# Patient Record
Sex: FEMALE | Race: WHITE
[De-identification: names, ages, dates, MRNs, and addresses within clinical notes are randomized per-mention and may not be internally consistent; named-entity substitution may affect disease eponyms.]

---

## 2019-08-09 ENCOUNTER — HOSPITAL ENCOUNTER (OUTPATIENT)
Dept: HOSPITAL 62 - RAD | Age: 64
End: 2019-08-09
Attending: ORTHOPAEDIC SURGERY
Payer: COMMERCIAL
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
## 2019-08-09 DIAGNOSIS — S89.91XS: Primary | ICD-10-CM

## 2019-08-09 PROCEDURE — 77002 NEEDLE LOCALIZATION BY XRAY: CPT

## 2019-08-09 PROCEDURE — 27369 NJX CNTRST KNE ARTHG/CT/MRI: CPT

## 2019-08-09 NOTE — RADIOLOGY REPORT (SQ)
EXAM DESCRIPTION:  ARTHRO KNEE NJX CNTRST CT/MRI; FLUORO/NEEDLE PLACEMENT



COMPLETED DATE/TIME:  8/9/2019 10:20 am; 8/9/2019 10:21 am



REASON FOR STUDY:  S89.91XS UNSPECIFIED INJURY OF RIGHT LOWER LEG, SEQUELA S89.91XS  UNSPECIFIED INJU
RY OF RIGHT LOWER LEG, SEQUELA



COMPARISON:  None.



FLUOROSCOPY TIME:  0.2 minutes

2 images saved to PACS.



LIMITATIONS:  None.



PROCEDURE:  Procedure, risks, benefits and alternatives explained to patient who then gave written co
nsent.  The right knee was marked and a time-out was called for correct marking verification.  Entry 
site marked using fluoroscopic guidance.  Knee prepped and draped using sterile technique.  Local ane
sthesia achieved using 1% lidocaine injection.  Hypodermic needle introduced into the joint space und
er direct fluoroscopic visualization.  Non-ionic contrast instilled to confirm intra-articular positi
on.  Additional dilute non-ionic contrast instilled.  Needle removed and entry site covered with ster
ile bandage.  No immediate complications noted.



TECHNIQUE:  Digital images acquired during fluoroscopy and stored on PACS.   Patient immediately take
n to the CT suite for additional imaging.

INJECTION LOCATION: Right knee.

CONTRAST TYPE AND AMOUNT: 8 cc 1% lidocaine, 1 cc Omnipaque, 35 cc Omnipaque saline mixture



IMPRESSION:  SUCCESSFUL NEEDLE PLACEMENT AND INJECTION FOR RIGHT KNEE CT ARTHROGRAM.



COMMENT:  Quality :  Final reports for procedures using fluoroscopy that document radiation exp
osure indices, or exposure time and number of fluorographic images (if radiation exposure indices are
 not available)



TECHNICAL DOCUMENTATION:  JOB ID:  [id number]

 2011 [employer]- All Rights Reserved



Reading location - IP/workstation name: [email or web address]

## 2019-08-09 NOTE — RADIOLOGY REPORT (SQ)
EXAM DESCRIPTION:  ARTHRO KNEE NJX CNTRST CT/MRI; FLUORO/NEEDLE PLACEMENT



COMPLETED DATE/TIME:  8/9/2019 10:20 am; 8/9/2019 10:21 am



REASON FOR STUDY:  S89.91XS UNSPECIFIED INJURY OF RIGHT LOWER LEG, SEQUELA S89.91XS  UNSPECIFIED INJU
RY OF RIGHT LOWER LEG, SEQUELA



COMPARISON:  None.



FLUOROSCOPY TIME:  0.2 minutes

2 images saved to PACS.



LIMITATIONS:  None.



PROCEDURE:  Procedure, risks, benefits and alternatives explained to patient who then gave written co
nsent.  The right knee was marked and a time-out was called for correct marking verification.  Entry 
site marked using fluoroscopic guidance.  Knee prepped and draped using sterile technique.  Local ane
sthesia achieved using 1% lidocaine injection.  Hypodermic needle introduced into the joint space und
er direct fluoroscopic visualization.  Non-ionic contrast instilled to confirm intra-articular positi
on.  Additional dilute non-ionic contrast instilled.  Needle removed and entry site covered with ster
ile bandage.  No immediate complications noted.



TECHNIQUE:  Digital images acquired during fluoroscopy and stored on PACS.   Patient immediately take
n to the CT suite for additional imaging.

INJECTION LOCATION: Right knee.

CONTRAST TYPE AND AMOUNT: 8 cc 1% lidocaine, 1 cc Omnipaque, 35 cc Omnipaque saline mixture



IMPRESSION:  SUCCESSFUL NEEDLE PLACEMENT AND INJECTION FOR RIGHT KNEE CT ARTHROGRAM.



COMMENT:  Quality :  Final reports for procedures using fluoroscopy that document radiation exp
osure indices, or exposure time and number of fluorographic images (if radiation exposure indices are
 not available)



TECHNICAL DOCUMENTATION:  JOB ID:  [id number]

 2011 [employer]- All Rights Reserved



Reading location - IP/workstation name: [email or web address]

## 2019-08-10 NOTE — RADIOLOGY REPORT (SQ)
EXAM DESCRIPTION:  CT RT LOWER EXTREMITY WITH



COMPLETED DATE/TIME:  8/9/2019 10:27 am



REASON FOR STUDY:  S89.91XS UNSPECIFIED INJURY OF RIGHT LOWER LEG, SEQUELA S89.91XS  UNSPECIFIED INJU
RY OF RIGHT LOWER LEG, SEQUELA



COMPARISON:   None.



TECHNIQUE:  Post arthrographic imaging performed through the right knee with reformatted coronal and 
sagittal imaging windowed for bone and soft tissues.

All CT scanners at this facility use dose modulation, iterative reconstruction, and/or weight based d
osing when appropriate to reduce radiation dose to as low as reasonably achievable (ALARA).

[facility]: Dose Right  [facility]: CareDose    [facility]: Dose Right    [facility]: Teradose 4D    [facility]: Smart DFine



RADIATION DOSE:  CT Rad equipment meets quality standard of care and radiation dose reduction techniq
ues were employed. CTDIvol: 5.2 mGy. DLP: 139 mGy-cm.  mGy.



LIMITATIONS:  None.



FINDINGS:  SOFT TISSUES: No regional soft tissue masses or radiopaque foreign body noted.

BONES: No suspicious bone lesion.  No fracture.

JOINT DISTENTION: Adequate distention with contrast.  No intra-articular bodies.

CHONDRAL SURFACES: Patellofemoral chondral thinning.  No discrete full-thickness defects or large sub
chondral cysts or erosions suggested.  Similar findings in the medial and lateral compartments.

MENISCI: Medial and lateral menisci are without gross tear or extrusion.

OTHER: Normal patellar height.



IMPRESSION:

1. Chondral thinning throughout all compartments.

2. No meniscus tear.



TECHNICAL DOCUMENTATION:  JOB ID:  [id number]

Quality ID # [id number]: Final reports with documentation of one or more dose reduction techniques (e.g., Au
tomated exposure control, adjustment of the mA and/or kV according to patient size, use of iterative 
reconstruction technique)

 2011 [employer]- All Rights Reserved



Reading location - IP/workstation name: [email or web address]

## 2024-05-21 PROBLEM — E78.5 DYSLIPIDEMIA: Status: ACTIVE | Noted: 2024-05-13

## 2024-05-21 PROBLEM — E03.9 HYPOTHYROIDISM: Status: ACTIVE | Noted: 2024-05-13

## 2024-05-21 PROBLEM — G10 HUNTINGTON'S DISEASE (HCC): Chronic | Status: ACTIVE | Noted: 2023-09-13

## 2024-06-18 PROBLEM — I10 ESSENTIAL HYPERTENSION: Status: ACTIVE | Noted: 2024-06-11

## 2024-06-18 PROBLEM — E87.1 HYPONATREMIA: Chronic | Status: ACTIVE | Noted: 2024-06-11

## 2024-06-18 PROBLEM — E03.9 ACQUIRED HYPOTHYROIDISM: Chronic | Status: ACTIVE | Noted: 2024-05-13

## 2024-06-18 PROBLEM — E78.2 MIXED DYSLIPIDEMIA: Chronic | Status: ACTIVE | Noted: 2024-05-13

## 2024-06-18 PROBLEM — E78.2 MIXED DYSLIPIDEMIA: Status: ACTIVE | Noted: 2024-05-13

## 2024-06-18 PROBLEM — G47.01 INSOMNIA DUE TO MEDICAL CONDITION: Status: ACTIVE | Noted: 2024-06-11

## 2024-06-18 PROBLEM — G31.9 DIFFUSE BRAIN ATROPHY (HCC): Chronic | Status: ACTIVE | Noted: 2024-06-11

## 2024-06-18 PROBLEM — E87.1 HYPONATREMIA: Status: ACTIVE | Noted: 2024-06-11

## 2024-06-18 PROBLEM — G31.9 DIFFUSE BRAIN ATROPHY (HCC): Status: ACTIVE | Noted: 2024-06-11

## 2024-07-02 PROBLEM — I10 ESSENTIAL HYPERTENSION: Chronic | Status: ACTIVE | Noted: 2024-06-11

## 2024-07-02 PROBLEM — G47.01 INSOMNIA DUE TO MEDICAL CONDITION: Chronic | Status: ACTIVE | Noted: 2024-06-11

## 2024-07-07 PROBLEM — K21.9 GERD WITHOUT ESOPHAGITIS: Chronic | Status: ACTIVE | Noted: 2024-07-02

## 2024-07-07 PROBLEM — F51.05 INSOMNIA SECONDARY TO DEPRESSION WITH ANXIETY: Chronic | Status: ACTIVE | Noted: 2024-07-02

## 2024-07-07 PROBLEM — F41.8 INSOMNIA SECONDARY TO DEPRESSION WITH ANXIETY: Chronic | Status: ACTIVE | Noted: 2024-07-02

## 2024-07-07 PROBLEM — Z71.89 ADVANCED CARE PLANNING/COUNSELING DISCUSSION: Status: ACTIVE | Noted: 2024-07-02

## 2024-07-07 PROBLEM — Z71.89 ADVANCED CARE PLANNING/COUNSELING DISCUSSION: Chronic | Status: ACTIVE | Noted: 2024-07-02

## 2024-07-07 PROBLEM — F51.05 INSOMNIA SECONDARY TO DEPRESSION WITH ANXIETY: Status: ACTIVE | Noted: 2024-07-02

## 2024-07-07 PROBLEM — K21.9 GERD WITHOUT ESOPHAGITIS: Status: ACTIVE | Noted: 2024-07-02

## 2024-07-07 PROBLEM — F41.8 INSOMNIA SECONDARY TO DEPRESSION WITH ANXIETY: Status: ACTIVE | Noted: 2024-07-02

## 2024-07-12 ENCOUNTER — HOSPITAL ENCOUNTER (OUTPATIENT)
Facility: MEDICAL CENTER | Age: 69
End: 2024-07-12
Attending: NURSE PRACTITIONER
Payer: MEDICARE

## 2024-07-12 DIAGNOSIS — Z79.899 HIGH RISK MEDICATION USE: ICD-10-CM

## 2024-07-12 DIAGNOSIS — E87.1 HYPONATREMIA: Chronic | ICD-10-CM

## 2024-07-12 DIAGNOSIS — I10 ESSENTIAL HYPERTENSION: ICD-10-CM

## 2024-07-12 DIAGNOSIS — E03.9 ACQUIRED HYPOTHYROIDISM: Chronic | ICD-10-CM

## 2024-07-12 DIAGNOSIS — E78.2 MIXED DYSLIPIDEMIA: Chronic | ICD-10-CM

## 2024-07-12 LAB
25(OH)D3 SERPL-MCNC: 55 NG/ML (ref 30–100)
ALBUMIN SERPL BCP-MCNC: 3.8 G/DL (ref 3.2–4.9)
ALBUMIN/GLOB SERPL: 1.3 G/DL
ALP SERPL-CCNC: 395 U/L (ref 30–99)
ALT SERPL-CCNC: 23 U/L (ref 2–50)
ANION GAP SERPL CALC-SCNC: 16 MMOL/L (ref 7–16)
APPEARANCE UR: CLEAR
AST SERPL-CCNC: 26 U/L (ref 12–45)
BACTERIA #/AREA URNS HPF: NEGATIVE /HPF
BASOPHILS # BLD AUTO: 0.7 % (ref 0–1.8)
BASOPHILS # BLD: 0.04 K/UL (ref 0–0.12)
BILIRUB SERPL-MCNC: 0.5 MG/DL (ref 0.1–1.5)
BILIRUB UR QL STRIP.AUTO: NEGATIVE
BUN SERPL-MCNC: 7 MG/DL (ref 8–22)
CALCIUM ALBUM COR SERPL-MCNC: 9.7 MG/DL (ref 8.5–10.5)
CALCIUM SERPL-MCNC: 9.5 MG/DL (ref 8.5–10.5)
CHLORIDE SERPL-SCNC: 101 MMOL/L (ref 96–112)
CHOLEST SERPL-MCNC: 174 MG/DL (ref 100–199)
CO2 SERPL-SCNC: 26 MMOL/L (ref 20–33)
COLOR UR: YELLOW
CREAT SERPL-MCNC: 0.86 MG/DL (ref 0.5–1.4)
EOSINOPHIL # BLD AUTO: 0.16 K/UL (ref 0–0.51)
EOSINOPHIL NFR BLD: 2.7 % (ref 0–6.9)
EPI CELLS #/AREA URNS HPF: NEGATIVE /HPF
ERYTHROCYTE [DISTWIDTH] IN BLOOD BY AUTOMATED COUNT: 50.9 FL (ref 35.9–50)
EST. AVERAGE GLUCOSE BLD GHB EST-MCNC: 97 MG/DL
GFR SERPLBLD CREATININE-BSD FMLA CKD-EPI: 73 ML/MIN/1.73 M 2
GLOBULIN SER CALC-MCNC: 3 G/DL (ref 1.9–3.5)
GLUCOSE SERPL-MCNC: 102 MG/DL (ref 65–99)
GLUCOSE UR STRIP.AUTO-MCNC: NEGATIVE MG/DL
HBA1C MFR BLD: 5 % (ref 4–5.6)
HCT VFR BLD AUTO: 47.3 % (ref 37–47)
HDLC SERPL-MCNC: 60 MG/DL
HGB BLD-MCNC: 15 G/DL (ref 12–16)
HYALINE CASTS #/AREA URNS LPF: NORMAL /LPF
IMM GRANULOCYTES # BLD AUTO: 0.01 K/UL (ref 0–0.11)
IMM GRANULOCYTES NFR BLD AUTO: 0.2 % (ref 0–0.9)
KETONES UR STRIP.AUTO-MCNC: NEGATIVE MG/DL
LDLC SERPL CALC-MCNC: 96 MG/DL
LEUKOCYTE ESTERASE UR QL STRIP.AUTO: ABNORMAL
LYMPHOCYTES # BLD AUTO: 1.57 K/UL (ref 1–4.8)
LYMPHOCYTES NFR BLD: 26.4 % (ref 22–41)
MAGNESIUM SERPL-MCNC: 2.3 MG/DL (ref 1.5–2.5)
MCH RBC QN AUTO: 30.7 PG (ref 27–33)
MCHC RBC AUTO-ENTMCNC: 31.7 G/DL (ref 32.2–35.5)
MCV RBC AUTO: 96.9 FL (ref 81.4–97.8)
MICRO URNS: ABNORMAL
MONOCYTES # BLD AUTO: 0.48 K/UL (ref 0–0.85)
MONOCYTES NFR BLD AUTO: 8.1 % (ref 0–13.4)
NEUTROPHILS # BLD AUTO: 3.68 K/UL (ref 1.82–7.42)
NEUTROPHILS NFR BLD: 61.9 % (ref 44–72)
NITRITE UR QL STRIP.AUTO: NEGATIVE
NRBC # BLD AUTO: 0 K/UL
NRBC BLD-RTO: 0 /100 WBC (ref 0–0.2)
PH UR STRIP.AUTO: 7.5 [PH] (ref 5–8)
PHOSPHATE SERPL-MCNC: 3.7 MG/DL (ref 2.5–4.5)
PLATELET # BLD AUTO: 346 K/UL (ref 164–446)
PMV BLD AUTO: 11 FL (ref 9–12.9)
POTASSIUM SERPL-SCNC: 3.2 MMOL/L (ref 3.6–5.5)
PROT SERPL-MCNC: 6.8 G/DL (ref 6–8.2)
PROT UR QL STRIP: NEGATIVE MG/DL
RBC # BLD AUTO: 4.88 M/UL (ref 4.2–5.4)
RBC # URNS HPF: NORMAL /HPF
RBC UR QL AUTO: NEGATIVE
SODIUM SERPL-SCNC: 143 MMOL/L (ref 135–145)
SP GR UR STRIP.AUTO: 1.01
TRIGL SERPL-MCNC: 88 MG/DL (ref 0–149)
UROBILINOGEN UR STRIP.AUTO-MCNC: 0.2 MG/DL
WBC # BLD AUTO: 5.9 K/UL (ref 4.8–10.8)
WBC #/AREA URNS HPF: NORMAL /HPF

## 2024-07-12 PROCEDURE — 83735 ASSAY OF MAGNESIUM: CPT

## 2024-07-12 PROCEDURE — 83036 HEMOGLOBIN GLYCOSYLATED A1C: CPT | Mod: GA

## 2024-07-12 PROCEDURE — 87086 URINE CULTURE/COLONY COUNT: CPT

## 2024-07-12 PROCEDURE — 80053 COMPREHEN METABOLIC PANEL: CPT

## 2024-07-12 PROCEDURE — 81001 URINALYSIS AUTO W/SCOPE: CPT

## 2024-07-12 PROCEDURE — 85025 COMPLETE CBC W/AUTO DIFF WBC: CPT

## 2024-07-12 PROCEDURE — 84100 ASSAY OF PHOSPHORUS: CPT

## 2024-07-12 PROCEDURE — 82306 VITAMIN D 25 HYDROXY: CPT

## 2024-07-12 PROCEDURE — 80061 LIPID PANEL: CPT

## 2024-07-15 LAB
BACTERIA UR CULT: NORMAL
SIGNIFICANT IND 70042: NORMAL
SITE SITE: NORMAL
SOURCE SOURCE: NORMAL

## 2024-08-20 PROBLEM — E87.6 HYPOKALEMIA: Status: ACTIVE | Noted: 2024-08-13

## 2024-08-20 PROBLEM — E87.6 HYPOKALEMIA: Chronic | Status: ACTIVE | Noted: 2024-08-13

## 2024-09-24 ENCOUNTER — APPOINTMENT (OUTPATIENT)
Dept: NEUROLOGY | Facility: MEDICAL CENTER | Age: 69
End: 2024-09-24
Attending: PSYCHIATRY & NEUROLOGY
Payer: MEDICARE

## 2024-10-05 PROBLEM — F33.40 DEPRESSION, MAJOR, RECURRENT, IN REMISSION (HCC): Chronic | Status: ACTIVE | Noted: 2024-09-24

## 2024-10-05 PROBLEM — R15.9 FUNCTIONAL FECAL INCONTINENCE: Chronic | Status: ACTIVE | Noted: 2024-09-24

## 2024-10-05 PROBLEM — R15.9 FUNCTIONAL FECAL INCONTINENCE: Status: ACTIVE | Noted: 2024-09-24

## 2024-10-05 PROBLEM — F33.40 DEPRESSION, MAJOR, RECURRENT, IN REMISSION (HCC): Status: ACTIVE | Noted: 2024-09-24

## 2024-10-17 ENCOUNTER — HOSPITAL ENCOUNTER (OUTPATIENT)
Facility: MEDICAL CENTER | Age: 69
End: 2024-10-17
Attending: NURSE PRACTITIONER
Payer: MEDICARE

## 2024-10-17 DIAGNOSIS — R15.9 FUNCTIONAL FECAL INCONTINENCE: Chronic | ICD-10-CM

## 2024-10-17 DIAGNOSIS — I10 ESSENTIAL HYPERTENSION: Chronic | ICD-10-CM

## 2024-10-17 DIAGNOSIS — E87.1 HYPONATREMIA: Chronic | ICD-10-CM

## 2024-10-17 DIAGNOSIS — R30.0 DYSURIA: ICD-10-CM

## 2024-10-17 LAB
ALBUMIN SERPL BCP-MCNC: 3.9 G/DL (ref 3.2–4.9)
ALBUMIN/GLOB SERPL: 1.3 G/DL
ALP SERPL-CCNC: 152 U/L (ref 30–99)
ALT SERPL-CCNC: 11 U/L (ref 2–50)
ANION GAP SERPL CALC-SCNC: 14 MMOL/L (ref 7–16)
AST SERPL-CCNC: 16 U/L (ref 12–45)
BASOPHILS # BLD AUTO: 0.5 % (ref 0–1.8)
BASOPHILS # BLD: 0.03 K/UL (ref 0–0.12)
BILIRUB SERPL-MCNC: 0.4 MG/DL (ref 0.1–1.5)
BUN SERPL-MCNC: 6 MG/DL (ref 8–22)
CALCIUM ALBUM COR SERPL-MCNC: 10.2 MG/DL (ref 8.5–10.5)
CALCIUM SERPL-MCNC: 10.1 MG/DL (ref 8.5–10.5)
CHLORIDE SERPL-SCNC: 101 MMOL/L (ref 96–112)
CO2 SERPL-SCNC: 28 MMOL/L (ref 20–33)
CREAT SERPL-MCNC: 1.09 MG/DL (ref 0.5–1.4)
EOSINOPHIL # BLD AUTO: 0.07 K/UL (ref 0–0.51)
EOSINOPHIL NFR BLD: 1.1 % (ref 0–6.9)
ERYTHROCYTE [DISTWIDTH] IN BLOOD BY AUTOMATED COUNT: 46.1 FL (ref 35.9–50)
GFR SERPLBLD CREATININE-BSD FMLA CKD-EPI: 55 ML/MIN/1.73 M 2
GLOBULIN SER CALC-MCNC: 2.9 G/DL (ref 1.9–3.5)
GLUCOSE SERPL-MCNC: 80 MG/DL (ref 65–99)
HCT VFR BLD AUTO: 44.3 % (ref 37–47)
HGB BLD-MCNC: 14.7 G/DL (ref 12–16)
IMM GRANULOCYTES # BLD AUTO: 0.01 K/UL (ref 0–0.11)
IMM GRANULOCYTES NFR BLD AUTO: 0.2 % (ref 0–0.9)
LYMPHOCYTES # BLD AUTO: 1.19 K/UL (ref 1–4.8)
LYMPHOCYTES NFR BLD: 18.8 % (ref 22–41)
MCH RBC QN AUTO: 29.9 PG (ref 27–33)
MCHC RBC AUTO-ENTMCNC: 33.2 G/DL (ref 32.2–35.5)
MCV RBC AUTO: 90 FL (ref 81.4–97.8)
MONOCYTES # BLD AUTO: 0.48 K/UL (ref 0–0.85)
MONOCYTES NFR BLD AUTO: 7.6 % (ref 0–13.4)
NEUTROPHILS # BLD AUTO: 4.55 K/UL (ref 1.82–7.42)
NEUTROPHILS NFR BLD: 71.8 % (ref 44–72)
NRBC # BLD AUTO: 0 K/UL
NRBC BLD-RTO: 0 /100 WBC (ref 0–0.2)
PLATELET # BLD AUTO: 288 K/UL (ref 164–446)
PMV BLD AUTO: 11 FL (ref 9–12.9)
POTASSIUM SERPL-SCNC: 3.1 MMOL/L (ref 3.6–5.5)
PROT SERPL-MCNC: 6.8 G/DL (ref 6–8.2)
RBC # BLD AUTO: 4.92 M/UL (ref 4.2–5.4)
SODIUM SERPL-SCNC: 143 MMOL/L (ref 135–145)
WBC # BLD AUTO: 6.3 K/UL (ref 4.8–10.8)

## 2024-10-17 PROCEDURE — 85025 COMPLETE CBC W/AUTO DIFF WBC: CPT

## 2024-10-17 PROCEDURE — 80053 COMPREHEN METABOLIC PANEL: CPT

## 2024-10-22 PROBLEM — N18.30 BENIGN HYPERTENSION WITH CKD (CHRONIC KIDNEY DISEASE) STAGE III: Chronic | Status: ACTIVE | Noted: 2024-06-11

## 2024-10-22 PROBLEM — I12.9 BENIGN HYPERTENSION WITH CKD (CHRONIC KIDNEY DISEASE) STAGE III: Chronic | Status: ACTIVE | Noted: 2024-06-11

## 2024-10-22 PROBLEM — Z91.81 AT MAXIMUM RISK FOR FALL: Chronic | Status: ACTIVE | Noted: 2024-10-22

## 2024-10-22 PROBLEM — N18.30 BENIGN HYPERTENSION WITH CKD (CHRONIC KIDNEY DISEASE) STAGE III: Status: ACTIVE | Noted: 2024-06-11

## 2024-10-22 PROBLEM — I12.9 BENIGN HYPERTENSION WITH CKD (CHRONIC KIDNEY DISEASE) STAGE III: Status: ACTIVE | Noted: 2024-06-11

## 2024-10-22 PROBLEM — Z91.81 AT MAXIMUM RISK FOR FALL: Status: ACTIVE | Noted: 2024-10-22

## 2024-11-18 ENCOUNTER — APPOINTMENT (OUTPATIENT)
Dept: RADIOLOGY | Facility: MEDICAL CENTER | Age: 69
End: 2024-11-18
Attending: EMERGENCY MEDICINE
Payer: MEDICARE

## 2024-11-18 ENCOUNTER — HOSPITAL ENCOUNTER (EMERGENCY)
Facility: MEDICAL CENTER | Age: 69
End: 2024-11-18
Attending: EMERGENCY MEDICINE
Payer: MEDICARE

## 2024-11-18 VITALS
RESPIRATION RATE: 17 BRPM | WEIGHT: 160 LBS | BODY MASS INDEX: 25.71 KG/M2 | HEIGHT: 66 IN | HEART RATE: 95 BPM | SYSTOLIC BLOOD PRESSURE: 131 MMHG | DIASTOLIC BLOOD PRESSURE: 80 MMHG | TEMPERATURE: 97.5 F | OXYGEN SATURATION: 95 %

## 2024-11-18 DIAGNOSIS — M25.551 RIGHT HIP PAIN: ICD-10-CM

## 2024-11-18 PROCEDURE — 99283 EMERGENCY DEPT VISIT LOW MDM: CPT

## 2024-11-18 PROCEDURE — A9270 NON-COVERED ITEM OR SERVICE: HCPCS | Performed by: EMERGENCY MEDICINE

## 2024-11-18 PROCEDURE — 700102 HCHG RX REV CODE 250 W/ 637 OVERRIDE(OP): Performed by: EMERGENCY MEDICINE

## 2024-11-18 PROCEDURE — 73501 X-RAY EXAM HIP UNI 1 VIEW: CPT | Mod: RT

## 2024-11-18 RX ORDER — ACETAMINOPHEN 500 MG
1000 TABLET ORAL ONCE
Status: COMPLETED | OUTPATIENT
Start: 2024-11-18 | End: 2024-11-18

## 2024-11-18 RX ORDER — IBUPROFEN 600 MG/1
600 TABLET, FILM COATED ORAL ONCE
Status: COMPLETED | OUTPATIENT
Start: 2024-11-18 | End: 2024-11-18

## 2024-11-18 RX ADMIN — IBUPROFEN 600 MG: 600 TABLET, FILM COATED ORAL at 15:51

## 2024-11-18 RX ADMIN — ACETAMINOPHEN 1000 MG: 500 TABLET ORAL at 15:50

## 2024-11-18 ASSESSMENT — FIBROSIS 4 INDEX: FIB4 SCORE: 1.16

## 2024-11-18 NOTE — ED PROVIDER NOTES
ED Provider Note    Primary care provider: QUANG Barber  Means of arrival: private vehicle  History obtained from: patient  History limited by: none    CHIEF COMPLAINT  Chief Complaint   Patient presents with    Hip Pain     Was attempting to get into a wheelchair and fell onto R hip and has been having 5/10 pain. Able to move extremity. Denies taking blood thinner, -headstrike, -LOC.        HPI/ROS  Cinda Perrin is a 69 y.o. female who presents to the Emergency Department for evaluation of right hip pain.  Patient reports that she was visiting her  upstairs in the hospital and when she went to sit down in a chair she missed the chair and landed right on her right hip.  She notes soreness to her right hip.  She was able to ambulate after the incident but wanted to have it checked.  Denies any other injuries.  She did not hit her head or lose consciousness.  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    EXTERNAL RECORDS REVIEWED  Patient last seen by primary care provider on 10/22/2024.  Per that note patient currently resides at [facility] for her Coryell's disease.  She also has a history of hypertension, chronic kidney disease, hypothyroidism, dyslipidemia.    LIMITATION TO HISTORY   None    PAST MEDICAL HISTORY   Sade's disease, hypertension, chronic kidney disease, hypothyroidism    SURGICAL HISTORY  patient denies any surgical history    SOCIAL HISTORY  Social History     Tobacco Use    Smoking status: Never    Smokeless tobacco: Never   Substance Use Topics    Alcohol use: Not Currently     Comment: Used to drink glass of deanne coke    Drug use: Never      Social History     Substance and Sexual Activity   Drug Use Never       FAMILY HISTORY  History reviewed. No pertinent family history.    CURRENT MEDICATIONS  Home Medications       Reviewed by Elizabeth Lopez R.N. (Registered Nurse) on 11/18/24 at 1522  Med  "List Status: Partial     Medication Last Dose Status   acetaminophen (TYLENOL) 650 MG CR tablet  Active   aspirin 81 MG EC tablet  Active   calcium carbonate (OS-VANCE 500) 1250 (500 Ca) MG Tab  Active   cholecalciferol (VITAMIN D3) 5000 UNIT Cap  Active   colestipol (COLESTID) 1 GM Tab  Active   Deutetrabenazine (AUSTEDO) 12 MG Tab  Active   levothyroxine (SYNTHROID) 50 MCG Tab  Active   lisinopril (PRINIVIL) 10 MG Tab  Active   mirtazapine (REMERON) 30 MG Tab tablet  Active   pantoprazole (PROTONIX) 20 MG tablet  Active   potassium chloride ER (KLOR-CON) 10 MEQ tablet  Active   STARCH-MALTO DEXTRIN (THICK-IT) Powder  Active   traZODone (DESYREL) 50 MG Tab  Active                    ALLERGIES  Allergies   Allergen Reactions    Codeine Unspecified     Intake    Oxycodone Rash       PHYSICAL EXAM  VITAL SIGNS: /76   Pulse 98   Temp 36.5 °C (97.7 °F) (Temporal)   Resp 18   Ht 1.676 m (5' 6\")   Wt 72.6 kg (160 lb)   SpO2 94%   BMI 25.82 kg/m²   Vitals reviewed by myself.  Physical Exam  Nursing note and vitals reviewed.  Constitutional: Well-developed and well-nourished.  Patient appears uncomfortable  HENT: Head is normocephalic and atraumatic.  Eyes: extra-ocular movements intact  Cardiovascular: Regular rate and  regular rhythm.  2+ bilateral distal pedal pulses  Pulmonary/Chest: Normal respiratory effort  Musculoskeletal: Patient has full range of motion of her right hip without difficulty.  No tenderness to palpation.  No obvious deformity.  Neurological: Awake and alert, sensation intact in bilateral lower extremities  Skin: Skin is warm and dry. No rash.         DIAGNOSTIC STUDIES:  LABS  none    RADIOLOGY  Images independently interpreted by myself prior to radiologist review:  -X-ray demonstrates no acute bony pathology    Final interpretation by radiology demonstrates:    DX-HIP-UNILATERAL-WITH PELVIS-1 VIEW RIGHT   Final Result      No RIGHT hip or pelvic fracture.        The radiologist's " interpretation of all radiological studies have been reviewed by me.      COURSE & MEDICAL DECISION MAKING      INITIAL ASSESSMENT, ED COURSE AND PLAN    Patient is a 69-year-old female who presents for evaluation of right hip pain.  Differential diagnosis includes contusion, sprain, strain, fracture, dislocation.  Diagnostic workup includes right hip x-ray.    Patient's initial vitals are within normal limits.  She is neurovascularly intact on exam.  Patient is treated with Tylenol and Motrin for her discomfort.  X-ray returns and demonstrates no acute bony pathology.  Patient is able to ambulate and therefore further imaging not indicated.  Therefore patient is reassured and advised on management of right hip pain.  She is given strict return precautions and discharged in stable condition.    DISPOSITION AND DISCUSSIONS  I have discussed management of the patient with the following physicians and GAIL's:  none    Discussion of management with other QHP or appropriate source(s): none     Escalation of care considered, and ultimately not performed:see above    Barriers to care at this time, including but not limited to: none.     Decision tools and prescription drugs considered including, but not limited to: se above.        FINAL IMPRESSION  1. Right hip pain

## 2024-11-18 NOTE — ED TRIAGE NOTES
"Chief Complaint   Patient presents with    Hip Pain     Was attempting to get into a wheelchair and fell onto R hip and has been having 5/10 pain. Able to move extremity. Denies taking blood thinner, -headstrike, -LOC.      /76   Pulse 98   Temp 36.5 °C (97.7 °F) (Temporal)   Resp 18   Ht 1.676 m (5' 6\")   Wt 72.6 kg (160 lb)   SpO2 94%   BMI 25.82 kg/m²     Pt ambulatory to triage w/ above complaint.   Denies any medical history.  Placed pt back in lobby, educated on NPO status.     "

## 2024-11-19 NOTE — ED NOTES
Pt provided discharge instructions. Pt verbalized understanding of all instructions. Pt to lobby via wheelchair.

## 2024-12-01 PROBLEM — M25.551 RIGHT HIP PAIN: Status: ACTIVE | Noted: 2024-11-26

## 2024-12-01 PROBLEM — M25.551 RIGHT HIP PAIN: Chronic | Status: ACTIVE | Noted: 2024-11-26
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
## 2024-12-29 PROCEDURE — 99285 EMERGENCY DEPT VISIT HI MDM: CPT

## 2024-12-29 ASSESSMENT — FIBROSIS 4 INDEX: FIB4 SCORE: 1.16

## 2024-12-30 ENCOUNTER — HOSPITAL ENCOUNTER (INPATIENT)
Facility: MEDICAL CENTER | Age: 69
End: 2024-12-30
Attending: EMERGENCY MEDICINE | Admitting: STUDENT IN AN ORGANIZED HEALTH CARE EDUCATION/TRAINING PROGRAM
Payer: MEDICARE

## 2024-12-30 DIAGNOSIS — E87.1 HYPONATREMIA: ICD-10-CM

## 2024-12-30 DIAGNOSIS — R55 SYNCOPE, UNSPECIFIED SYNCOPE TYPE: Primary | ICD-10-CM

## 2024-12-30 DIAGNOSIS — R53.1 WEAKNESS: ICD-10-CM

## 2024-12-30 DIAGNOSIS — G10 HUNTINGTON'S DISEASE (HCC): ICD-10-CM

## 2024-12-30 DIAGNOSIS — R11.2 NAUSEA AND VOMITING, UNSPECIFIED VOMITING TYPE: ICD-10-CM

## 2024-12-30 LAB
ALBUMIN SERPL BCP-MCNC: 4 G/DL (ref 3.2–4.9)
ALBUMIN/GLOB SERPL: 1.4 G/DL
ALP SERPL-CCNC: 151 U/L (ref 30–99)
ALT SERPL-CCNC: 18 U/L (ref 2–50)
ANION GAP SERPL CALC-SCNC: 15 MMOL/L (ref 7–16)
APPEARANCE UR: CLEAR
AST SERPL-CCNC: 51 U/L (ref 12–45)
BASOPHILS # BLD AUTO: 0.1 % (ref 0–1.8)
BASOPHILS # BLD: 0.02 K/UL (ref 0–0.12)
BILIRUB SERPL-MCNC: 0.4 MG/DL (ref 0.1–1.5)
BILIRUB UR QL STRIP.AUTO: NEGATIVE
BUN SERPL-MCNC: 4 MG/DL (ref 8–22)
CALCIUM ALBUM COR SERPL-MCNC: 8.9 MG/DL (ref 8.5–10.5)
CALCIUM SERPL-MCNC: 8.9 MG/DL (ref 8.5–10.5)
CHLORIDE SERPL-SCNC: 88 MMOL/L (ref 96–112)
CO2 SERPL-SCNC: 18 MMOL/L (ref 20–33)
COLOR UR: YELLOW
CREAT SERPL-MCNC: 0.89 MG/DL (ref 0.5–1.4)
CREAT UR-MCNC: 53.3 MG/DL
EKG IMPRESSION: NORMAL
EOSINOPHIL # BLD AUTO: 0 K/UL (ref 0–0.51)
EOSINOPHIL NFR BLD: 0 % (ref 0–6.9)
ERYTHROCYTE [DISTWIDTH] IN BLOOD BY AUTOMATED COUNT: 41.3 FL (ref 35.9–50)
ETHANOL BLD-MCNC: 45.2 MG/DL
GFR SERPLBLD CREATININE-BSD FMLA CKD-EPI: 70 ML/MIN/1.73 M 2
GLOBULIN SER CALC-MCNC: 2.9 G/DL (ref 1.9–3.5)
GLUCOSE SERPL-MCNC: 136 MG/DL (ref 65–99)
GLUCOSE UR STRIP.AUTO-MCNC: NEGATIVE MG/DL
HCT VFR BLD AUTO: 36.2 % (ref 37–47)
HGB BLD-MCNC: 13 G/DL (ref 12–16)
IMM GRANULOCYTES # BLD AUTO: 0.07 K/UL (ref 0–0.11)
IMM GRANULOCYTES NFR BLD AUTO: 0.4 % (ref 0–0.9)
KETONES UR STRIP.AUTO-MCNC: NEGATIVE MG/DL
LEUKOCYTE ESTERASE UR QL STRIP.AUTO: NEGATIVE
LIPASE SERPL-CCNC: 20 U/L (ref 11–82)
LYMPHOCYTES # BLD AUTO: 0.53 K/UL (ref 1–4.8)
LYMPHOCYTES NFR BLD: 3.2 % (ref 22–41)
MCH RBC QN AUTO: 31.6 PG (ref 27–33)
MCHC RBC AUTO-ENTMCNC: 35.9 G/DL (ref 32.2–35.5)
MCV RBC AUTO: 88.1 FL (ref 81.4–97.8)
MICRO URNS: NORMAL
MONOCYTES # BLD AUTO: 1.01 K/UL (ref 0–0.85)
MONOCYTES NFR BLD AUTO: 6.1 % (ref 0–13.4)
NEUTROPHILS # BLD AUTO: 14.96 K/UL (ref 1.82–7.42)
NEUTROPHILS NFR BLD: 90.2 % (ref 44–72)
NITRITE UR QL STRIP.AUTO: NEGATIVE
NRBC # BLD AUTO: 0 K/UL
NRBC BLD-RTO: 0 /100 WBC (ref 0–0.2)
OSMOLALITY SERPL: 269 MOSM/KG H2O (ref 278–298)
OSMOLALITY UR: 223 MOSM/KG H2O (ref 300–900)
PH UR STRIP.AUTO: 5.5 [PH] (ref 5–8)
PLATELET # BLD AUTO: 331 K/UL (ref 164–446)
PMV BLD AUTO: 9.3 FL (ref 9–12.9)
POC BREATHALIZER: 0.02 PERCENT (ref 0–0.01)
POTASSIUM SERPL-SCNC: 3.4 MMOL/L (ref 3.6–5.5)
PROT SERPL-MCNC: 6.9 G/DL (ref 6–8.2)
PROT UR QL STRIP: NEGATIVE MG/DL
RBC # BLD AUTO: 4.11 M/UL (ref 4.2–5.4)
RBC UR QL AUTO: NEGATIVE
SODIUM SERPL-SCNC: 121 MMOL/L (ref 135–145)
SODIUM SERPL-SCNC: 122 MMOL/L (ref 135–145)
SODIUM SERPL-SCNC: 123 MMOL/L (ref 135–145)
SODIUM SERPL-SCNC: 127 MMOL/L (ref 135–145)
SODIUM SERPL-SCNC: 130 MMOL/L (ref 135–145)
SODIUM SERPL-SCNC: 131 MMOL/L (ref 135–145)
SODIUM UR-SCNC: 25 MMOL/L
SP GR UR STRIP.AUTO: 1.01
TROPONIN T SERPL-MCNC: 17 NG/L (ref 6–19)
TSH SERPL DL<=0.005 MIU/L-ACNC: 0.82 UIU/ML (ref 0.38–5.33)
UROBILINOGEN UR STRIP.AUTO-MCNC: 0.2 EU/DL
WBC # BLD AUTO: 16.6 K/UL (ref 4.8–10.8)

## 2024-12-30 PROCEDURE — 700111 HCHG RX REV CODE 636 W/ 250 OVERRIDE (IP): Mod: JZ | Performed by: STUDENT IN AN ORGANIZED HEALTH CARE EDUCATION/TRAINING PROGRAM

## 2024-12-30 PROCEDURE — 82077 ASSAY SPEC XCP UR&BREATH IA: CPT

## 2024-12-30 PROCEDURE — 81003 URINALYSIS AUTO W/O SCOPE: CPT

## 2024-12-30 PROCEDURE — 36415 COLL VENOUS BLD VENIPUNCTURE: CPT

## 2024-12-30 PROCEDURE — 83935 ASSAY OF URINE OSMOLALITY: CPT

## 2024-12-30 PROCEDURE — 700102 HCHG RX REV CODE 250 W/ 637 OVERRIDE(OP): Performed by: STUDENT IN AN ORGANIZED HEALTH CARE EDUCATION/TRAINING PROGRAM

## 2024-12-30 PROCEDURE — 96375 TX/PRO/DX INJ NEW DRUG ADDON: CPT

## 2024-12-30 PROCEDURE — 85025 COMPLETE CBC W/AUTO DIFF WBC: CPT

## 2024-12-30 PROCEDURE — 99223 1ST HOSP IP/OBS HIGH 75: CPT | Performed by: STUDENT IN AN ORGANIZED HEALTH CARE EDUCATION/TRAINING PROGRAM

## 2024-12-30 PROCEDURE — 84443 ASSAY THYROID STIM HORMONE: CPT

## 2024-12-30 PROCEDURE — 302970 POC BREATHALIZER: Performed by: EMERGENCY MEDICINE

## 2024-12-30 PROCEDURE — 83690 ASSAY OF LIPASE: CPT

## 2024-12-30 PROCEDURE — 700111 HCHG RX REV CODE 636 W/ 250 OVERRIDE (IP): Performed by: EMERGENCY MEDICINE

## 2024-12-30 PROCEDURE — 84300 ASSAY OF URINE SODIUM: CPT

## 2024-12-30 PROCEDURE — 96365 THER/PROPH/DIAG IV INF INIT: CPT

## 2024-12-30 PROCEDURE — 84295 ASSAY OF SERUM SODIUM: CPT | Mod: 91

## 2024-12-30 PROCEDURE — 99497 ADVNCD CARE PLAN 30 MIN: CPT | Performed by: STUDENT IN AN ORGANIZED HEALTH CARE EDUCATION/TRAINING PROGRAM

## 2024-12-30 PROCEDURE — 84484 ASSAY OF TROPONIN QUANT: CPT

## 2024-12-30 PROCEDURE — 700102 HCHG RX REV CODE 250 W/ 637 OVERRIDE(OP): Performed by: HOSPITALIST

## 2024-12-30 PROCEDURE — 82570 ASSAY OF URINE CREATININE: CPT

## 2024-12-30 PROCEDURE — 83930 ASSAY OF BLOOD OSMOLALITY: CPT

## 2024-12-30 PROCEDURE — 700111 HCHG RX REV CODE 636 W/ 250 OVERRIDE (IP): Mod: JZ | Performed by: HOSPITALIST

## 2024-12-30 PROCEDURE — 93005 ELECTROCARDIOGRAM TRACING: CPT | Mod: TC | Performed by: EMERGENCY MEDICINE

## 2024-12-30 PROCEDURE — 770020 HCHG ROOM/CARE - TELE (206)

## 2024-12-30 PROCEDURE — 700105 HCHG RX REV CODE 258: Performed by: STUDENT IN AN ORGANIZED HEALTH CARE EDUCATION/TRAINING PROGRAM

## 2024-12-30 PROCEDURE — A9270 NON-COVERED ITEM OR SERVICE: HCPCS | Performed by: HOSPITALIST

## 2024-12-30 PROCEDURE — A9270 NON-COVERED ITEM OR SERVICE: HCPCS | Performed by: STUDENT IN AN ORGANIZED HEALTH CARE EDUCATION/TRAINING PROGRAM

## 2024-12-30 PROCEDURE — 80053 COMPREHEN METABOLIC PANEL: CPT

## 2024-12-30 RX ORDER — ONDANSETRON 2 MG/ML
4 INJECTION INTRAMUSCULAR; INTRAVENOUS EVERY 4 HOURS PRN
Status: DISCONTINUED | OUTPATIENT
Start: 2024-12-30 | End: 2025-01-02 | Stop reason: HOSPADM

## 2024-12-30 RX ORDER — ONDANSETRON 4 MG/1
4 TABLET, ORALLY DISINTEGRATING ORAL EVERY 4 HOURS PRN
Status: DISCONTINUED | OUTPATIENT
Start: 2024-12-30 | End: 2025-01-02 | Stop reason: HOSPADM

## 2024-12-30 RX ORDER — COLESTIPOL HYDROCHLORIDE 1 G/1
1 TABLET ORAL NIGHTLY
Status: DISCONTINUED | OUTPATIENT
Start: 2024-12-30 | End: 2025-01-02 | Stop reason: HOSPADM

## 2024-12-30 RX ORDER — TRAMADOL HYDROCHLORIDE 50 MG/1
50 TABLET ORAL EVERY 6 HOURS PRN
Status: DISCONTINUED | OUTPATIENT
Start: 2024-12-30 | End: 2024-12-30

## 2024-12-30 RX ORDER — HYDRALAZINE HYDROCHLORIDE 20 MG/ML
10 INJECTION INTRAMUSCULAR; INTRAVENOUS EVERY 4 HOURS PRN
Status: DISCONTINUED | OUTPATIENT
Start: 2024-12-30 | End: 2025-01-02 | Stop reason: HOSPADM

## 2024-12-30 RX ORDER — SODIUM CHLORIDE 9 MG/ML
INJECTION, SOLUTION INTRAVENOUS CONTINUOUS
Status: ACTIVE | OUTPATIENT
Start: 2024-12-30 | End: 2024-12-30

## 2024-12-30 RX ORDER — TRAZODONE HYDROCHLORIDE 50 MG/1
50 TABLET, FILM COATED ORAL NIGHTLY PRN
Status: DISCONTINUED | OUTPATIENT
Start: 2024-12-30 | End: 2025-01-02 | Stop reason: HOSPADM

## 2024-12-30 RX ORDER — TRAMADOL HYDROCHLORIDE 50 MG/1
50 TABLET ORAL EVERY 6 HOURS PRN
Status: DISPENSED | OUTPATIENT
Start: 2024-12-30 | End: 2024-12-31

## 2024-12-30 RX ORDER — KETOROLAC TROMETHAMINE 15 MG/ML
15 INJECTION, SOLUTION INTRAMUSCULAR; INTRAVENOUS EVERY 6 HOURS PRN
Status: DISCONTINUED | OUTPATIENT
Start: 2024-12-30 | End: 2024-12-30

## 2024-12-30 RX ORDER — POTASSIUM CHLORIDE 1500 MG/1
40 TABLET, EXTENDED RELEASE ORAL ONCE
Status: DISPENSED | OUTPATIENT
Start: 2024-12-30 | End: 2024-12-31

## 2024-12-30 RX ORDER — ASPIRIN 81 MG/1
81 TABLET ORAL DAILY
Status: DISCONTINUED | OUTPATIENT
Start: 2024-12-30 | End: 2024-12-30

## 2024-12-30 RX ORDER — ACETAMINOPHEN 325 MG/1
650 TABLET ORAL EVERY 6 HOURS PRN
Status: DISCONTINUED | OUTPATIENT
Start: 2024-12-30 | End: 2025-01-02 | Stop reason: HOSPADM

## 2024-12-30 RX ORDER — KETOROLAC TROMETHAMINE 15 MG/ML
15 INJECTION, SOLUTION INTRAMUSCULAR; INTRAVENOUS EVERY 6 HOURS PRN
Status: DISCONTINUED | OUTPATIENT
Start: 2024-12-30 | End: 2024-12-31

## 2024-12-30 RX ORDER — POTASSIUM CHLORIDE 7.45 MG/ML
10 INJECTION INTRAVENOUS
Status: DISPENSED | OUTPATIENT
Start: 2024-12-30 | End: 2024-12-30

## 2024-12-30 RX ORDER — ONDANSETRON 4 MG/1
4 TABLET, ORALLY DISINTEGRATING ORAL ONCE
Status: COMPLETED | OUTPATIENT
Start: 2024-12-30 | End: 2024-12-30

## 2024-12-30 RX ORDER — POTASSIUM CHLORIDE 7.45 MG/ML
10 INJECTION INTRAVENOUS
Status: COMPLETED | OUTPATIENT
Start: 2024-12-30 | End: 2024-12-30

## 2024-12-30 RX ORDER — LISINOPRIL 10 MG/1
10 TABLET ORAL DAILY
Status: DISCONTINUED | OUTPATIENT
Start: 2024-12-30 | End: 2025-01-02 | Stop reason: HOSPADM

## 2024-12-30 RX ORDER — MIRTAZAPINE 15 MG/1
60 TABLET, FILM COATED ORAL
Status: DISCONTINUED | OUTPATIENT
Start: 2024-12-30 | End: 2025-01-02 | Stop reason: HOSPADM

## 2024-12-30 RX ORDER — ENOXAPARIN SODIUM 100 MG/ML
40 INJECTION SUBCUTANEOUS DAILY
Status: DISCONTINUED | OUTPATIENT
Start: 2024-12-30 | End: 2025-01-02 | Stop reason: HOSPADM

## 2024-12-30 RX ORDER — LEVOTHYROXINE SODIUM 50 UG/1
50 TABLET ORAL
Status: DISCONTINUED | OUTPATIENT
Start: 2024-12-30 | End: 2025-01-02 | Stop reason: HOSPADM

## 2024-12-30 RX ADMIN — LISINOPRIL 10 MG: 10 TABLET ORAL at 05:42

## 2024-12-30 RX ADMIN — MIRTAZAPINE 60 MG: 30 TABLET, FILM COATED ORAL at 18:13

## 2024-12-30 RX ADMIN — ACETAMINOPHEN 650 MG: 325 TABLET ORAL at 14:03

## 2024-12-30 RX ADMIN — OMEPRAZOLE 20 MG: 20 CAPSULE, DELAYED RELEASE ORAL at 05:42

## 2024-12-30 RX ADMIN — ONDANSETRON 4 MG: 2 INJECTION INTRAMUSCULAR; INTRAVENOUS at 05:42

## 2024-12-30 RX ADMIN — ENOXAPARIN SODIUM 40 MG: 100 INJECTION SUBCUTANEOUS at 18:13

## 2024-12-30 RX ADMIN — POTASSIUM CHLORIDE 10 MEQ: 7.46 INJECTION, SOLUTION INTRAVENOUS at 08:16

## 2024-12-30 RX ADMIN — ONDANSETRON 4 MG: 4 TABLET, ORALLY DISINTEGRATING ORAL at 02:10

## 2024-12-30 RX ADMIN — DEUTETRABENAZINE 2 TABLET: 12 TABLET, COATED ORAL at 21:51

## 2024-12-30 RX ADMIN — POTASSIUM CHLORIDE 10 MEQ: 10 INJECTION, SOLUTION INTRAVENOUS at 05:10

## 2024-12-30 RX ADMIN — KETOROLAC TROMETHAMINE 15 MG: 15 INJECTION, SOLUTION INTRAMUSCULAR; INTRAVENOUS at 21:15

## 2024-12-30 RX ADMIN — SODIUM CHLORIDE: 9 INJECTION, SOLUTION INTRAVENOUS at 05:16

## 2024-12-30 RX ADMIN — TRAMADOL HYDROCHLORIDE 50 MG: 50 TABLET, COATED ORAL at 16:07

## 2024-12-30 RX ADMIN — COLESTIPOL HYDROCHLORIDE 1 G: 1 TABLET, FILM COATED ORAL at 22:35

## 2024-12-30 RX ADMIN — LEVOTHYROXINE SODIUM 50 MCG: 0.05 TABLET ORAL at 05:42

## 2024-12-30 SDOH — ECONOMIC STABILITY: TRANSPORTATION INSECURITY
IN THE PAST 12 MONTHS, HAS LACK OF RELIABLE TRANSPORTATION KEPT YOU FROM MEDICAL APPOINTMENTS, MEETINGS, WORK OR FROM GETTING THINGS NEEDED FOR DAILY LIVING?: PATIENT DECLINED

## 2024-12-30 SDOH — ECONOMIC STABILITY: TRANSPORTATION INSECURITY
IN THE PAST 12 MONTHS, HAS THE LACK OF TRANSPORTATION KEPT YOU FROM MEDICAL APPOINTMENTS OR FROM GETTING MEDICATIONS?: PATIENT DECLINED

## 2024-12-30 ASSESSMENT — ENCOUNTER SYMPTOMS
CHILLS: 0
FALLS: 1
ABDOMINAL PAIN: 0
HALLUCINATIONS: 0
PALPITATIONS: 0
VOMITING: 0
NECK PAIN: 0
FLANK PAIN: 0
NERVOUS/ANXIOUS: 0
HEADACHES: 0
NAUSEA: 0
BACK PAIN: 0
SPUTUM PRODUCTION: 0
ORTHOPNEA: 0
SHORTNESS OF BREATH: 0
DIZZINESS: 0
DIARRHEA: 0
HEMOPTYSIS: 0
FEVER: 0

## 2024-12-30 ASSESSMENT — COGNITIVE AND FUNCTIONAL STATUS - GENERAL
MOVING FROM LYING ON BACK TO SITTING ON SIDE OF FLAT BED: A LITTLE
WALKING IN HOSPITAL ROOM: A LITTLE
MOBILITY SCORE: 16
HELP NEEDED FOR BATHING: A LITTLE
TOILETING: A LITTLE
STANDING UP FROM CHAIR USING ARMS: A LITTLE
EATING MEALS: A LITTLE
DRESSING REGULAR UPPER BODY CLOTHING: A LOT
PERSONAL GROOMING: A LITTLE
SUGGESTED CMS G CODE MODIFIER MOBILITY: CK
MOVING TO AND FROM BED TO CHAIR: A LOT
CLIMB 3 TO 5 STEPS WITH RAILING: A LOT
DRESSING REGULAR LOWER BODY CLOTHING: A LITTLE
SUGGESTED CMS G CODE MODIFIER DAILY ACTIVITY: CK
TURNING FROM BACK TO SIDE WHILE IN FLAT BAD: A LITTLE
DAILY ACTIVITIY SCORE: 17

## 2024-12-30 ASSESSMENT — SOCIAL DETERMINANTS OF HEALTH (SDOH)
WITHIN THE LAST YEAR, HAVE TO BEEN RAPED OR FORCED TO HAVE ANY KIND OF SEXUAL ACTIVITY BY YOUR PARTNER OR EX-PARTNER?: NO
IN THE PAST 12 MONTHS, HAS THE ELECTRIC, GAS, OIL, OR WATER COMPANY THREATENED TO SHUT OFF SERVICE IN YOUR HOME?: PATIENT DECLINED
WITHIN THE LAST YEAR, HAVE YOU BEEN KICKED, HIT, SLAPPED, OR OTHERWISE PHYSICALLY HURT BY YOUR PARTNER OR EX-PARTNER?: NO
WITHIN THE LAST YEAR, HAVE YOU BEEN HUMILIATED OR EMOTIONALLY ABUSED IN OTHER WAYS BY YOUR PARTNER OR EX-PARTNER?: NO
WITHIN THE PAST 12 MONTHS, THE FOOD YOU BOUGHT JUST DIDN'T LAST AND YOU DIDN'T HAVE MONEY TO GET MORE: PATIENT DECLINED
WITHIN THE PAST 12 MONTHS, YOU WORRIED THAT YOUR FOOD WOULD RUN OUT BEFORE YOU GOT THE MONEY TO BUY MORE: PATIENT DECLINED
WITHIN THE LAST YEAR, HAVE YOU BEEN AFRAID OF YOUR PARTNER OR EX-PARTNER?: NO

## 2024-12-30 ASSESSMENT — PAIN DESCRIPTION - PAIN TYPE
TYPE: ACUTE PAIN

## 2024-12-30 ASSESSMENT — LIFESTYLE VARIABLES: SUBSTANCE_ABUSE: 0

## 2024-12-30 ASSESSMENT — PATIENT HEALTH QUESTIONNAIRE - PHQ9
2. FEELING DOWN, DEPRESSED, IRRITABLE, OR HOPELESS: NOT AT ALL
SUM OF ALL RESPONSES TO PHQ9 QUESTIONS 1 AND 2: 0
1. LITTLE INTEREST OR PLEASURE IN DOING THINGS: NOT AT ALL

## 2024-12-30 ASSESSMENT — FIBROSIS 4 INDEX: FIB4 SCORE: 2.51

## 2024-12-30 NOTE — ED TRIAGE NOTES
Cinda Aydin  69 y.o. female  Chief Complaint   Patient presents with    N/V     Pt states she drank two rum and cokes this afternoon. Pt states she starting vomiting and fell in the bathroom. Denies LOC or head strike, -thinners.        Pt to triage via ED wheelchair. Pt arrives to ED via EMS. PIV. 20g RAC. 4 mg zofran. Pt denies N/V at this time.     Pt is alert, oriented, and follows commands. Pt speaking in full sentences and responds appropriately to questions. No acute distress noted in triage. Respirations are even and unlabored.     Pt to lobby and educated on triage process. Pt encouraged to alert triage staff for any changes in condition. Pt signed up for messaging updates prior to leaving triage room.    Vitals:    12/29/24 2222   BP: (!) 142/84   Pulse: 93   Resp: 18   Temp: 36.3 °C (97.4 °F)   SpO2: 99%

## 2024-12-30 NOTE — ED NOTES
Pt attempted to use the restroom with RN by bedside commode but unable to produce urine. Pt placed back in bed and on monitor. Call light within reach VSS NAD all needs addressed at this time

## 2024-12-30 NOTE — DISCHARGE PLANNING
-1018  DPA informed by Bhavna with [facility] that pt was on service with agency prior to admission.

## 2024-12-30 NOTE — PROGRESS NOTES
Bedside report received from off going RN/tech: Lin, assumed care of patient.     Fall Risk Score: HIGH RISK  Fall risk interventions in place: Place yellow fall risk ID band on patient, Provide patient/family education based on risk assessment, Educate patient/family to call staff for assistance when getting out of bed, Place fall precaution signage outside patient door, Place patient in room close to nursing station, Utilize bed/chair fall alarm, and Bed alarm connected correctly  Bed type: Regular (En Score less than 17 interventions in place)  Patient on cardiac monitor: Yes  IVF/IV medications: Infusion per MAR (List Med(s)) NS  Oxygen: Room Air  Bedside sitter: Not Applicable   Isolation: Not applicable

## 2024-12-30 NOTE — H&P
Hospital Medicine History & Physical Note    Date of Service  12/30/2024    Primary Care Physician  QUANG Barber        Code Status  DNAR/DNI    Chief Complaint  Chief Complaint   Patient presents with    N/V     Pt states she drank two rum and cokes this afternoon. Pt states she starting vomiting and fell in the bathroom. Denies LOC or head strike, -thinners.          History of Presenting Illness  Cinda Perrin is a 69 y.o. female who presented 12/30/2024 with after ground-level fall.  Patient has a known past medical history of Daisy's disease.  Patient states that earlier this evening, she had 2 cocktails.  She subsequently went to the bathroom, where she acutely became weak, and fell down to the ground.  She denies any loss of consciousness.  Denies any head strike.  She did have an episode of emesis as well.  Her  was concerned, so called EMS.  In our emergency department, vital signs stable.  Labs are notable for sodium of 121.  The patient will be admitted for management of hyponatremia    I discussed the plan of care with patient.    Review of Systems  Review of Systems   Constitutional:  Negative for chills and fever.   Respiratory:  Negative for hemoptysis, sputum production and shortness of breath.    Cardiovascular:  Negative for chest pain, palpitations and orthopnea.   Gastrointestinal:  Negative for abdominal pain, diarrhea, nausea and vomiting.   Genitourinary:  Negative for flank pain, frequency, hematuria and urgency.   Musculoskeletal:  Positive for falls. Negative for back pain and neck pain.   Neurological:  Negative for dizziness and headaches.   Psychiatric/Behavioral:  Negative for hallucinations, substance abuse and suicidal ideas. The patient is not nervous/anxious.    All other systems reviewed and are negative.      Past Medical History   has no past medical history on file.    Surgical History   has no past surgical history on file.     Family History  family  history is not on file.   Family history reviewed with patient. There is no family history that is pertinent to the chief complaint.     Social History   reports that she has never smoked. She has never used smokeless tobacco. She reports current alcohol use. She reports that she does not use drugs.    Allergies  Allergies   Allergen Reactions    Codeine Unspecified     Intake    Oxycodone Rash       Medications  Prior to Admission Medications   Prescriptions Last Dose Informant Patient Reported? Taking?   Deutetrabenazine (AUSTEDO) 12 MG Tab  Family Member Yes No   Sig: Take 2 Tablets by mouth 2 times a day. Indications: Agar's Chorea   STARCH-MALTO DEXTRIN (THICK-IT) Powder  Family Member Yes No   Sig: Take  by mouth. Dissolve 2-3 Tablespoons in each 8 0z of fluid beverage as needed; To prevent aspiration of thin liquids.   acetaminophen (TYLENOL) 650 MG CR tablet  Family Member Yes No   Sig: Take 650 mg by mouth every 6 hours as needed for Mild Pain.   aspirin 81 MG EC tablet  Family Member Yes No   Sig: Take 81 mg by mouth every day.   calcium carbonate (OS-VANCE 500) 1250 (500 Ca) MG Tab  Family Member Yes No   Sig: Take 500 mg by mouth every day. Indications: Low Amount of Calcium in the Blood   cholecalciferol (VITAMIN D3) 5000 UNIT Cap  Family Member Yes No   Sig: Take 5,000 Units by mouth every day.   colestipol (COLESTID) 1 GM Tab   No No   Sig: Take 1 Tablet by mouth every evening.   levothyroxine (SYNTHROID) 50 MCG Tab   No No   Sig: Take 1 Tablet by mouth every morning on an empty stomach. Indications: Underactive Thyroid   lisinopril (PRINIVIL) 10 MG Tab   No No   Sig: Take 1 Tablet by mouth every day. Indications: High Blood Pressure   mirtazapine (REMERON) 30 MG Tab tablet   No No   Sig: Take 2 Tablets by mouth 1/2 hour after dinner.   pantoprazole (PROTONIX) 20 MG tablet   No No   Sig: Take 1 Tablet by mouth every day. Before breakfast   potassium chloride ER (KLOR-CON) 10 MEQ tablet   No No    Sig: Take 1 Tablet by mouth every day.   traZODone (DESYREL) 50 MG Tab   No No   Sig: Take 1 Tablet by mouth at bedtime as needed for Sleep or Depression. Indications: Trouble Sleeping      Facility-Administered Medications: None       Physical Exam  Temp:  [36.3 °C (97.4 °F)] 36.3 °C (97.4 °F)  Pulse:  [93-98] 98  Resp:  [18] 18  BP: (131-142)/(79-84) 131/79  SpO2:  [92 %-99 %] 92 %  Blood Pressure : 131/79   Temperature: 36.3 °C (97.4 °F)   Pulse: 98   Respiration: 18   Pulse Oximetry: 92 %       Physical Exam  Constitutional:       General: She is not in acute distress.     Appearance: Normal appearance. She is normal weight. She is not ill-appearing, toxic-appearing or diaphoretic.   HENT:      Head: Normocephalic and atraumatic.      Nose: Nose normal.      Mouth/Throat:      Mouth: Mucous membranes are moist.   Eyes:      Extraocular Movements: Extraocular movements intact.      Pupils: Pupils are equal, round, and reactive to light.   Cardiovascular:      Rate and Rhythm: Normal rate and regular rhythm.      Pulses: Normal pulses.      Heart sounds: Normal heart sounds. No murmur heard.     No friction rub. No gallop.   Pulmonary:      Effort: Pulmonary effort is normal. No respiratory distress.      Breath sounds: No stridor. No wheezing, rhonchi or rales.   Chest:      Chest wall: No tenderness.   Abdominal:      General: Abdomen is flat. There is no distension.      Palpations: Abdomen is soft. There is no mass.      Tenderness: There is no abdominal tenderness. There is no guarding or rebound.      Hernia: No hernia is present.   Musculoskeletal:         General: No swelling, tenderness, deformity or signs of injury.      Right lower leg: No edema.      Left lower leg: No edema.      Comments:      Skin:     General: Skin is warm and dry.      Capillary Refill: Capillary refill takes less than 2 seconds.      Coloration: Skin is not jaundiced or pale.      Findings: No bruising or erythema.  "  Neurological:      General: No focal deficit present.      Mental Status: She is alert and oriented to person, place, and time. Mental status is at baseline.   Psychiatric:         Mood and Affect: Mood normal.         Behavior: Behavior normal.         Laboratory:      Recent Labs     12/30/24 0200   SODIUM 121*   POTASSIUM 3.4*   CHLORIDE 88*   CO2 18*   GLUCOSE 136*   BUN 4*   CREATININE 0.89   CALCIUM 8.9     Recent Labs     12/30/24 0200   ALTSGPT 18   ASTSGOT 51*   ALKPHOSPHAT 151*   TBILIRUBIN 0.4   LIPASE 20   GLUCOSE 136*         No results for input(s): \"NTPROBNP\" in the last 72 hours.      Recent Labs     12/30/24 0200   TROPONINT 17       Imaging:  No orders to display       X-Ray:  I have personally reviewed the images and compared with prior images.  EKG:  I have personally reviewed the images and compared with prior images.    Assessment/Plan:  Justification for Admission Status  I anticipate this patient will require at least two midnights for appropriate medical management, necessitating inpatient admission because hyponatremia    Patient will need a Telemetry bed on MEDICAL service .  The need is secondary to hyponatremia.    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  I do long discussion with the patient in regards to her Sade's disease and overall prognosis.  This time, patient does not want any life-saving measures including chest compressions or intubation.   CODE STATUS discussed with patient and wants to be DNR/DNI          VTE prophylaxis: enoxaparin ppx

## 2024-12-30 NOTE — ASSESSMENT & PLAN NOTE
Na 121 on presentation  Possibly secondary to hypovolemic hyponatremia  Continue with normal saline at 100cc/hour  Osm serum, Osm urine and Na urine  Improved with IVF  Now dc IVF  Avoid overcorrection more than 6 to 8 mEq over 24 hours

## 2024-12-30 NOTE — ED NOTES
Med Rec completed per patient     Allergies reviewed: yes    Antibiotics in the past 30 days: no    Anticoagulant in past 14 days: no    Pharmacy patient utilizes: [facility]  [phone]

## 2024-12-30 NOTE — ASSESSMENT & PLAN NOTE
I spent 17 minutes at bedside with patient discussing work-up, results, diagnosis, prognosis.  I do long discussion with the patient in regards to her Ionia's disease and overall prognosis.  This time, patient does not want any life-saving measures including chest compressions or intubation.   CODE STATUS discussed with patient and wants to be DNR/DNI

## 2024-12-30 NOTE — ED NOTES
Pt straight cath after failed attempts of urination and increased discomfort. Pt 600ml output. Urine sent

## 2024-12-30 NOTE — PROGRESS NOTES
I evaluated and examined the patient at bedside, patient's and nursing's questions were answered. For details see admission H&P,  with updates below.    69F with a past medical history of Sade's disease who is DNAR/DNI.   Here with hypovolemic hyponatremia.  Sodium levels are improving slowly as desired 121, then 122, then 123.     Complaining of back/hip pain asking for Tylenol.  Ordered I will order tramadol/Toradol as needed as a second line for severe pain.

## 2024-12-30 NOTE — ED PROVIDER NOTES
ED Provider Note    Scribed for Sarmad Staton by Dalia Frost. 12/30/2024  1:56 AM    Primary care provider: QUANG Barber  Means of arrival: EMS  History obtained from: Patient  History limited by: None    CHIEF COMPLAINT  Chief Complaint   Patient presents with    N/V     Pt states she drank two rum and cokes this afternoon. Pt states she starting vomiting and fell in the bathroom. Denies LOC or head strike, -thinners.        EXTERNAL RECORDS REVIEWED  Outpatient Notes The patient was seen on November 26th for an office visit for a follow up after a fall and right hip pain.     HPI/ROS  LIMITATION TO HISTORY   Select: : None  OUTSIDE HISTORIAN(S):  None.    HPI  Cidna Perrin is a 69 y.o. female who presents to the Emergency Department via EMS from assisted living facility for evaluation after a possible syncopal episode onset earlier tonight. She describes that she was on the toilet and fell on the floor. The patient is unsure if she had a syncopal episode. She denies any head strike, as she states that her head does not hurt. The patient reports that she started vomiting after the fall and notes that she vomited on herself. The patient states that she did not feel sick prior to the fall. She mentions that she had 2 rum and cokes earlier this evening. The patient denies any abdominal pain or dysuria. The patient states that she typically does not have any syncopal episodes. 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     REVIEW OF SYSTEMS  As above, all other systems reviewed and are negative.   See HPI for further details.     PAST MEDICAL HISTORY   The patient has history of Beaverhead's disease.     SURGICAL HISTORY  patient denies any surgical history    SOCIAL HISTORY  Social History     Tobacco Use    Smoking status: Never    Smokeless tobacco: Never   Vaping Use    Vaping status: Never Used   Substance Use  "Topics    Alcohol use: Yes     Comment: occ    Drug use: Never      Social History     Substance and Sexual Activity   Drug Use Never     FAMILY HISTORY  History reviewed. No pertinent family history.    CURRENT MEDICATIONS  Home Medications       Reviewed by Venus Schwab R.N. (Registered Nurse) on 12/29/24 at 2228  Med List Status: Partial     Medication Last Dose Status   acetaminophen (TYLENOL) 650 MG CR tablet  Active   aspirin 81 MG EC tablet  Active   calcium carbonate (OS-VANCE 500) 1250 (500 Ca) MG Tab  Active   cholecalciferol (VITAMIN D3) 5000 UNIT Cap  Active   colestipol (COLESTID) 1 GM Tab  Active   Deutetrabenazine (AUSTEDO) 12 MG Tab  Active   levothyroxine (SYNTHROID) 50 MCG Tab  Active   lisinopril (PRINIVIL) 10 MG Tab  Active   mirtazapine (REMERON) 30 MG Tab tablet  Active   pantoprazole (PROTONIX) 20 MG tablet  Active   potassium chloride ER (KLOR-CON) 10 MEQ tablet  Active   STARCH-MALTO DEXTRIN (THICK-IT) Powder  Active   traZODone (DESYREL) 50 MG Tab  Active                  ALLERGIES  Allergies   Allergen Reactions    Codeine Unspecified     Intake    Oxycodone Rash       PHYSICAL EXAM    VITAL SIGNS:   Vitals:    12/29/24 2222 12/29/24 2224 12/30/24 0154 12/30/24 0200   BP: (!) 142/84  137/79 131/79   Pulse: 93  98    Resp: 18      Temp: 36.3 °C (97.4 °F)      TempSrc: Temporal      SpO2: 99%  92%    Weight:  90.7 kg (200 lb)     Height:  1.676 m (5' 6\")       Vitals: My interpretation: normotensive, not tachycardic, afebrile, not hypoxic    Reinterpretation of vitals: Unchanged unremarkable    PE:   Gen: sitting comfortably, speaking clearly, appears in no acute distress   ENT: Mucous membranes moist, posterior pharynx clear, uvula midline, nares patent bilaterally   Neck: Supple, FROM  Pulmonary: Lungs are clear to auscultation bilaterally. No tachypnea  CV:  RRR, no murmur appreciated, pulses 2+ in both upper and lower extremities  Abdomen: soft, NT/ND; no rebound/guarding  : no CVA " or suprapubic tenderness   Neuro: A&Ox4 (person, place, time, situation), speech fluent, gait steady, no focal deficits appreciated  Skin: No rash or lesions.  No pallor or jaundice.  No cyanosis.  Warm and dry.     DIAGNOSTIC STUDIES / PROCEDURES    LABS  Results for orders placed or performed during the hospital encounter of 24   Complete Metabolic Panel    Collection Time: 24  2:00 AM   Result Value Ref Range    Sodium 121 (L) 135 - 145 mmol/L    Potassium 3.4 (L) 3.6 - 5.5 mmol/L    Chloride 88 (L) 96 - 112 mmol/L    Co2 18 (L) 20 - 33 mmol/L    Anion Gap 15.0 7.0 - 16.0    Glucose 136 (H) 65 - 99 mg/dL    Bun 4 (L) 8 - 22 mg/dL    Creatinine 0.89 0.50 - 1.40 mg/dL    Calcium 8.9 8.5 - 10.5 mg/dL    Correct Calcium 8.9 8.5 - 10.5 mg/dL    AST(SGOT) 51 (H) 12 - 45 U/L    ALT(SGPT) 18 2 - 50 U/L    Alkaline Phosphatase 151 (H) 30 - 99 U/L    Total Bilirubin 0.4 0.1 - 1.5 mg/dL    Albumin 4.0 3.2 - 4.9 g/dL    Total Protein 6.9 6.0 - 8.2 g/dL    Globulin 2.9 1.9 - 3.5 g/dL    A-G Ratio 1.4 g/dL   Lipase    Collection Time: 24  2:00 AM   Result Value Ref Range    Lipase 20 11 - 82 U/L   TROPONIN    Collection Time: 24  2:00 AM   Result Value Ref Range    Troponin T 17 6 - 19 ng/L   DIAGNOSTIC ALCOHOL    Collection Time: 24  2:00 AM   Result Value Ref Range    Diagnostic Alcohol 45.2 (H) <10.1 mg/dL   ESTIMATED GFR    Collection Time: 24  2:00 AM   Result Value Ref Range    GFR (CKD-EPI) 70 >60 mL/min/1.73 m 2   EKG (NOW)    Collection Time: 24  2:24 AM   Result Value Ref Range    Report       [facility] Emergency Dept.    Test Date:  2024  Pt Name:    SANCHEZ SUMAN           Department: ER  MRN:        [id number]                      Room:       [facility]  Gender:     Female                       Technician: [id number]  :        1955                   Requested By:ROBERTO CARLOS ROTH  Order #:    [id number]                    Vicente MD: Roberto Carlos  Shemar    Measurements  Intervals                                Axis  Rate:       91                           P:          48  MN:         141                          QRS:        2  QRSD:       91                           T:          57  QT:         381  QTc:        469    Interpretive Statements  Sinus rhythm  Minimal ST elevation, inferior leads  No previous ECG available for comparison  Electronically Signed On 12- 02:24:35 PST by Sarmad Staton     POC BREATHALIZER    Collection Time: 12/30/24  2:39 AM   Result Value Ref Range    POC Breathalizer 0.024 (A) 0.00 - 0.01 Percent      All labs reviewed by me. Labs were compared to prior labs if they were available. Significant for hyponatremia to 121 for sodium, otherwise electrolytes fairly unremarkable, normal glucose, normal renal function, normal liver enzymes, normal bilirubin, troponin negative, lipase normal, alcohol minimally elevated at 45, CBC pending.    COURSE & MEDICAL DECISION MAKING  Nursing notes, VS, PMSFHx, labs, EKG reviewed in chart.    Heart Score: Moderate    ED Observation Status? No; Patient does not meet criteria for ED Observation.     Ddx: Vasovagal syncope, dehydration, alcohol intoxication, hypoglycemia    MDM: 1:56 AM Cinad Perrin is a 69 y.o. female who presented with evaluation for an episode of syncope.  Has a history of advanced Whitman disease.  She did have 2-3 drinks this afternoon.  Tonight when she went to use the bathroom she was sitting on the toilet and she thinks she passed out.  She states she did not hit her head or anything like that and has no injuries, and she called the assisted living group to help her get up and they called the ambulance who recommended she come to the ED.  However she has no complaints and did not want to come she states.  She did have 1 episode of nausea vomiting after the event.  Denies any pain or injuries at this time.  Was in normal health previously.  Vital signs are  normal upon arrival here.  Physical exam is unremarkable and completely atraumatic.  Will undergo syncope workup here with EKG, labs, and Zofran ministration. All labs reviewed by me. Labs were compared to prior labs if they were available. Significant for hyponatremia to 121 for sodium, otherwise electrolytes fairly unremarkable, normal glucose, normal renal function, normal liver enzymes, normal bilirubin, troponin negative, lipase normal, alcohol minimally elevated at 45, CBC pending.  Considering her syncope and hyponatremia I think she requires admission for continued monitoring and treatment.  Patient updated on plan for admission and is amenable.  Discussed hospitalist and they are amenable.    ADDITIONAL PROBLEM LIST AND DISPOSITION    I have discussed management of the patient with the following physicians and GIAL's: Hospitalist    Discussion of management with other Q or appropriate source(s): None     Barriers to care at this time, including but not limited to:  None known .     Decision tools and prescription drugs considered including, but not limited to:  NIH stroke scale 0 .    FINAL IMPRESSION  1. Syncope, unspecified syncope type Acute   2. Nausea and vomiting, unspecified vomiting type Acute   3. Morton's disease (HCC) Acute   4. Hyponatremia Acute   5. Weakness Acute      Dalia BOOKER (Scribe), am scribing for, and in the presence of, Sarmad Staton.    Electronically signed by: Dalia Frost (Cecilibe), 12/30/2024    ISarmad personally performed the services described in this documentation, as scribed by Dalia Frost in my presence, and it is both accurate and complete.    The note accurately reflects work and decisions made by me.  Sarmad Staton  12/30/2024  2:10 AM 0

## 2024-12-30 NOTE — ED NOTES
Bedside report received from off going RN/tech: Luz, assumed care of patient.  POC discussed with patient. Call light within reach, all needs addressed at this time.       Fall risk interventions in place: Move the patient closer to the nurse's station, Patient's personal possessions are with in their safe reach, Place socks on patient, Place fall risk sign on patient's door, Keep floor surfaces clean and dry, and Accompanied to restroom (all applicable per [place] Fall risk assessment)   Continuous monitoring: Cardiac Leads, Pulse Ox, or Blood Pressure  IVF/IV medications: Infusion per MAR (List Med(s))    Oxygen: Room Air  Bedside sitter: Not Applicable   Isolation: Not Applicable

## 2024-12-30 NOTE — PROGRESS NOTES
4 Eyes Skin Assessment Completed by Frankee, RN and NEW Hearn.    Head WDL  Ears WDL  Nose WDL  Mouth WDL  Neck WDL  Breast/Chest WDL  Shoulder Blades WDL  Spine WDL  (R) Arm/Elbow/Hand Redness, Blanching, and Bruising to elbow  (L) Arm/Elbow/Hand Redness and Blanching  Abdomen WDL  Groin Redness and Blanching  Scrotum/Coccyx/Buttocks Redness and Blanching  (R) Leg Redness and Bruising to right hip  (L) Leg Redness, Blanching, and Bruising   (R) Heel/Foot/Toe Redness, Blanching, and Bruising to top of foot  (L) Heel/Foot/Toe Redness, Blanching, and Bruising          Devices In Places Tele Box, Blood Pressure Cuff, and Pulse Ox      Interventions In Place Heel Mepilex and Pillows    Possible Skin Injury Yes    Pictures Uploaded Into Epic Yes  Wound Consult Placed N/A  RN Wound Prevention Protocol Ordered Yes

## 2024-12-31 VITALS
HEIGHT: 66 IN | HEART RATE: 78 BPM | TEMPERATURE: 98.4 F | WEIGHT: 143.96 LBS | SYSTOLIC BLOOD PRESSURE: 168 MMHG | BODY MASS INDEX: 23.14 KG/M2 | RESPIRATION RATE: 18 BRPM | DIASTOLIC BLOOD PRESSURE: 87 MMHG | OXYGEN SATURATION: 92 %

## 2024-12-31 LAB
ANION GAP SERPL CALC-SCNC: 10 MMOL/L (ref 7–16)
BUN SERPL-MCNC: 8 MG/DL (ref 8–22)
CALCIUM SERPL-MCNC: 8.9 MG/DL (ref 8.5–10.5)
CHLORIDE SERPL-SCNC: 98 MMOL/L (ref 96–112)
CO2 SERPL-SCNC: 22 MMOL/L (ref 20–33)
CREAT SERPL-MCNC: 1.13 MG/DL (ref 0.5–1.4)
ERYTHROCYTE [DISTWIDTH] IN BLOOD BY AUTOMATED COUNT: 45.1 FL (ref 35.9–50)
GFR SERPLBLD CREATININE-BSD FMLA CKD-EPI: 53 ML/MIN/1.73 M 2
GLUCOSE SERPL-MCNC: 89 MG/DL (ref 65–99)
HCT VFR BLD AUTO: 35.9 % (ref 37–47)
HGB BLD-MCNC: 12.4 G/DL (ref 12–16)
MCH RBC QN AUTO: 31.5 PG (ref 27–33)
MCHC RBC AUTO-ENTMCNC: 34.5 G/DL (ref 32.2–35.5)
MCV RBC AUTO: 91.1 FL (ref 81.4–97.8)
PLATELET # BLD AUTO: 279 K/UL (ref 164–446)
PMV BLD AUTO: 9.8 FL (ref 9–12.9)
POTASSIUM SERPL-SCNC: 3.8 MMOL/L (ref 3.6–5.5)
RBC # BLD AUTO: 3.94 M/UL (ref 4.2–5.4)
SODIUM SERPL-SCNC: 126 MMOL/L (ref 135–145)
SODIUM SERPL-SCNC: 130 MMOL/L (ref 135–145)
SODIUM SERPL-SCNC: 131 MMOL/L (ref 135–145)
WBC # BLD AUTO: 6.2 K/UL (ref 4.8–10.8)

## 2024-12-31 PROCEDURE — 700111 HCHG RX REV CODE 636 W/ 250 OVERRIDE (IP): Mod: JZ | Performed by: STUDENT IN AN ORGANIZED HEALTH CARE EDUCATION/TRAINING PROGRAM

## 2024-12-31 PROCEDURE — 700102 HCHG RX REV CODE 250 W/ 637 OVERRIDE(OP): Performed by: STUDENT IN AN ORGANIZED HEALTH CARE EDUCATION/TRAINING PROGRAM

## 2024-12-31 PROCEDURE — 84295 ASSAY OF SERUM SODIUM: CPT

## 2024-12-31 PROCEDURE — 700105 HCHG RX REV CODE 258: Performed by: NURSE PRACTITIONER

## 2024-12-31 PROCEDURE — 80048 BASIC METABOLIC PNL TOTAL CA: CPT

## 2024-12-31 PROCEDURE — 36415 COLL VENOUS BLD VENIPUNCTURE: CPT

## 2024-12-31 PROCEDURE — 97166 OT EVAL MOD COMPLEX 45 MIN: CPT

## 2024-12-31 PROCEDURE — 99233 SBSQ HOSP IP/OBS HIGH 50: CPT | Performed by: STUDENT IN AN ORGANIZED HEALTH CARE EDUCATION/TRAINING PROGRAM

## 2024-12-31 PROCEDURE — A9270 NON-COVERED ITEM OR SERVICE: HCPCS | Performed by: STUDENT IN AN ORGANIZED HEALTH CARE EDUCATION/TRAINING PROGRAM

## 2024-12-31 PROCEDURE — 770020 HCHG ROOM/CARE - TELE (206)

## 2024-12-31 PROCEDURE — 85027 COMPLETE CBC AUTOMATED: CPT

## 2024-12-31 RX ORDER — SODIUM CHLORIDE 9 MG/ML
INJECTION, SOLUTION INTRAVENOUS CONTINUOUS
Status: DISCONTINUED | OUTPATIENT
Start: 2024-12-31 | End: 2025-01-02

## 2024-12-31 RX ORDER — KETOROLAC TROMETHAMINE 15 MG/ML
15 INJECTION, SOLUTION INTRAMUSCULAR; INTRAVENOUS EVERY 6 HOURS PRN
Status: DISCONTINUED | OUTPATIENT
Start: 2024-12-31 | End: 2025-01-02 | Stop reason: HOSPADM

## 2024-12-31 RX ADMIN — DEUTETRABENAZINE 2 TABLET: 12 TABLET, COATED ORAL at 06:01

## 2024-12-31 RX ADMIN — MIRTAZAPINE 60 MG: 30 TABLET, FILM COATED ORAL at 17:27

## 2024-12-31 RX ADMIN — LEVOTHYROXINE SODIUM 50 MCG: 0.05 TABLET ORAL at 05:59

## 2024-12-31 RX ADMIN — ACETAMINOPHEN 650 MG: 325 TABLET ORAL at 20:13

## 2024-12-31 RX ADMIN — ONDANSETRON 4 MG: 2 INJECTION INTRAMUSCULAR; INTRAVENOUS at 20:53

## 2024-12-31 RX ADMIN — DEUTETRABENAZINE 2 TABLET: 12 TABLET, COATED ORAL at 17:28

## 2024-12-31 RX ADMIN — SODIUM CHLORIDE: 9 INJECTION, SOLUTION INTRAVENOUS at 21:35

## 2024-12-31 RX ADMIN — OMEPRAZOLE 20 MG: 20 CAPSULE, DELAYED RELEASE ORAL at 05:59

## 2024-12-31 RX ADMIN — COLESTIPOL HYDROCHLORIDE 1 G: 1 TABLET, FILM COATED ORAL at 20:21

## 2024-12-31 RX ADMIN — ENOXAPARIN SODIUM 40 MG: 100 INJECTION SUBCUTANEOUS at 17:27

## 2024-12-31 RX ADMIN — KETOROLAC TROMETHAMINE 15 MG: 15 INJECTION, SOLUTION INTRAMUSCULAR; INTRAVENOUS at 13:41

## 2024-12-31 RX ADMIN — LISINOPRIL 10 MG: 10 TABLET ORAL at 05:59

## 2024-12-31 ASSESSMENT — FIBROSIS 4 INDEX: FIB4 SCORE: 2.97

## 2024-12-31 ASSESSMENT — COGNITIVE AND FUNCTIONAL STATUS - GENERAL
DRESSING REGULAR LOWER BODY CLOTHING: A LITTLE
DAILY ACTIVITIY SCORE: 21
SUGGESTED CMS G CODE MODIFIER DAILY ACTIVITY: CJ
TOILETING: A LITTLE
HELP NEEDED FOR BATHING: A LITTLE

## 2024-12-31 ASSESSMENT — ACTIVITIES OF DAILY LIVING (ADL): TOILETING: INDEPENDENT

## 2024-12-31 ASSESSMENT — PAIN DESCRIPTION - PAIN TYPE: TYPE: ACUTE PAIN

## 2024-12-31 NOTE — CARE PLAN
The patient is Stable - Low risk of patient condition declining or worsening    Shift Goals  Clinical Goals: Monitor VS/labs, pain control, comfort  Patient Goals: Pain control    Progress made toward(s) clinical / shift goals:    Problem: Fall Risk  Goal: Patient will remain free from falls  Outcome: Progressing  Note: Treaded socks and bed/strip alarm on, side rails up x 2. Call light within reach. Pt educated to call for assistance. Reinforce as needed. Continue to monitor.         Problem: Pain - Standard  Goal: Alleviation of pain or a reduction in pain to the patient’s comfort goal  Outcome: Progressing  Note: Assess and monitor for pain. Provide pharmacological and non-pharmacological interventions as appropriate. Re-evaluate and continue to monitor.          Patient is not progressing towards the following goals:       complains of pain/discomfort

## 2024-12-31 NOTE — PROGRESS NOTES
Monitor Summary  Rhythm: 67-86  Rate: SR  Ectopy: PAC  Measurements: .15/.05/.37  ---12 hr Chart Review---

## 2024-12-31 NOTE — THERAPY
Physical Therapy Contact Note    PT consult received and acknowledged. Evaluation attempted; patient eager to have breakfast, asked PT to return later. Will re attempt as able and appropriate.     Jenni Mejia, PT, DPT  [phone]

## 2024-12-31 NOTE — CARE PLAN
The patient is Stable - Low risk of patient condition declining or worsening    Shift Goals  Clinical Goals: pain control, monitor labs  Patient Goals: pain control, comfort  Family Goals: not present      Problem: Knowledge Deficit - Standard  Goal: Patient and family/care givers will demonstrate understanding of plan of care, disease process/condition, diagnostic tests and medications  Outcome: Progressing     Problem: Skin Integrity  Goal: Skin integrity is maintained or improved  Outcome: Progressing     Problem: Pain - Standard  Goal: Alleviation of pain or a reduction in pain to the patient’s comfort goal  Outcome: Progressing

## 2024-12-31 NOTE — PROGRESS NOTES
Assumed care of patient and received report from Praveena WOLFF. Assessment completed. Pt A&Ox 4. Respirations are even and unlabored on room air. Pt reports 7/10 pain in right hip. Medication per MAR. Call light and belongings are within reach. Tele monitor in place. POC updated. Pt educated on room and call light, pt verbalized understanding. Needs met.

## 2024-12-31 NOTE — DISCHARGE PLANNING
Case Management Discharge Planning    Admission Date: 12/30/2024  GMLOS: 2.6  ALOS: 1    6-Clicks ADL Score: 17  6-Clicks Mobility Score: 16  PT and/or OT Eval ordered: Yes  Post-acute Referrals Ordered: No  Post-acute Choice Obtained: No  Has referral(s) been sent to post-acute provider:  No      Anticipated Discharge Dispo: Discharge Disposition: D/T to home under HHA care in anticipation of covered skilled care (06)  Discharge Address: [address] [place] [address] [address] [place] [address] [place] [address]    DME Needed: No    Action(s) Taken: DC Assessment Complete (See below)    RNCM met with patient at bedside to complete assessment and discuss discharge planning. Pt reports she lives in an assisted living facility in [place] ([facility]).     At baseline she uses a walker and needs assistance with IADLs. She was on service with [facility]; will need resumption of service home health order. MD aware.     Pt's PCP is [facility]; preferred pharmacy is [facility]. Insurance coverage confirmed via Medicare. Pt reports she has income from [employer] but is unsure of the amount. Pt's brother Dewey is her DPOA; has docs on file.     Pt denies smoking, she drinks alcohol occasionally and does not use any recreational drugs.     RNCM discussed discharge planning. Plan is for patient to discharge back to [facility] with resumption of home health service.     RNCM called [facility] to confirm patient is a resident there; spoke with Dariel who confirmed that patient lives there. [facility] can also provide transportation back when patient is medically cleared.     Escalations Completed: None    Medically Clear: No    Next Steps: Resumption of home health service; medical clearance. F/U with medical team regarding D/C needs/ barriers.     Barriers to Discharge: Medical clearance    Is the patient up for discharge tomorrow: No     Care Transition Team Assessment    Information Source  Orientation Level:  Oriented X4  Information Given By: Patient  Informant's Name: Cinda Aydin  Who is responsible for making decisions for patient? : POA  Name(s) of Primary Decision Maker: JAMEL WILL    Readmission Evaluation  Is this a readmission?: No    Elopement Risk  Legal Hold: No  Ambulatory or Self Mobile in Wheelchair: No-Not an Elopement Risk  Elopement Risk: Not at Risk for Elopement    Interdisciplinary Discharge Planning  Lives with - Patient's Self Care Capacity: Alone and Able to Care For Self, Attendant / Paid Care Giver  Patient or legal guardian wants to designate a caregiver: No  Support Systems: Children  Housing / Facility: Assisted Living Residence  Name of Care Facility: [facility]    Discharge Preparedness  What is your plan after discharge?: Home with help, Other (comment) (Assited Living)  What are your discharge supports?: Child, Other (comment) (Assisted living staff)  Prior Functional Level: Ambulatory, Needs Assist with Activities of Daily Living, Needs Assist with Medication Management, Uses Walker    Functional Assesment  Prior Functional Level: Ambulatory, Needs Assist with Activities of Daily Living, Needs Assist with Medication Management, Uses Walker    Finances  Financial Barriers to Discharge: No  Prescription Coverage: Yes    Vision / Hearing Impairment  Vision Impairment : No  Right Eye Vision: Impaired, Wears Glasses  Left Eye Vision: Impaired, Wears Glasses  Hearing Impairment : No    Advance Directive  Advance Directive?: DPOA for Health Care  Durable Power of  Name and Contact : JAMEL WILL [phone]    Domestic Abuse  Have you ever been the victim of abuse or violence?: No  Possible Abuse/Neglect Reported to:: Not Applicable    Psychological Assessment  History of Substance Abuse: None  History of Psychiatric Problems: No    Discharge Risks or Barriers  Discharge risks or barriers?: Complex medical needs  Patient risk factors: Complex medical  needs    Anticipated Discharge Information  Discharge Disposition: D/T to home under HHA care in anticipation of covered skilled care (06)  Discharge Address: [address] [place] [address] [id number]  Niki GARCIA [id number]

## 2024-12-31 NOTE — PROGRESS NOTES
Bedside report received from off going RN/tech: Rachel, assumed care of patient.     Fall Risk Score: HIGH RISK  Fall risk interventions in place: Place yellow fall risk ID band on patient, Provide patient/family education based on risk assessment, Educate patient/family to call staff for assistance when getting out of bed, Place fall precaution signage outside patient door, Place patient in room close to nursing station, Utilize bed/chair fall alarm, and Bed alarm connected correctly  Bed type: Regular (En Score less than 17 interventions in place)  Patient on cardiac monitor: Yes  IVF/IV medications: Not Applicable   Oxygen: Room Air  Bedside sitter: Not Applicable   Isolation: Not applicable

## 2025-01-01 LAB
ANION GAP SERPL CALC-SCNC: 10 MMOL/L (ref 7–16)
BUN SERPL-MCNC: 8 MG/DL (ref 8–22)
CALCIUM SERPL-MCNC: 9 MG/DL (ref 8.5–10.5)
CHLORIDE SERPL-SCNC: 89 MMOL/L (ref 96–112)
CO2 SERPL-SCNC: 21 MMOL/L (ref 20–33)
CREAT SERPL-MCNC: 1.13 MG/DL (ref 0.5–1.4)
ERYTHROCYTE [DISTWIDTH] IN BLOOD BY AUTOMATED COUNT: 43.7 FL (ref 35.9–50)
GFR SERPLBLD CREATININE-BSD FMLA CKD-EPI: 53 ML/MIN/1.73 M 2
GLUCOSE SERPL-MCNC: 101 MG/DL (ref 65–99)
HCT VFR BLD AUTO: 35.6 % (ref 37–47)
HGB BLD-MCNC: 12.5 G/DL (ref 12–16)
MAGNESIUM SERPL-MCNC: 1.8 MG/DL (ref 1.5–2.5)
MCH RBC QN AUTO: 32.3 PG (ref 27–33)
MCHC RBC AUTO-ENTMCNC: 35.1 G/DL (ref 32.2–35.5)
MCV RBC AUTO: 92 FL (ref 81.4–97.8)
NT-PROBNP SERPL IA-MCNC: 276 PG/ML (ref 0–125)
PHOSPHATE SERPL-MCNC: 3.2 MG/DL (ref 2.5–4.5)
PLATELET # BLD AUTO: 241 K/UL (ref 164–446)
PMV BLD AUTO: 9.6 FL (ref 9–12.9)
POTASSIUM SERPL-SCNC: 3.8 MMOL/L (ref 3.6–5.5)
RBC # BLD AUTO: 3.87 M/UL (ref 4.2–5.4)
SODIUM SERPL-SCNC: 120 MMOL/L (ref 135–145)
SODIUM SERPL-SCNC: 124 MMOL/L (ref 135–145)
SODIUM SERPL-SCNC: 126 MMOL/L (ref 135–145)
WBC # BLD AUTO: 7.2 K/UL (ref 4.8–10.8)

## 2025-01-01 PROCEDURE — 97162 PT EVAL MOD COMPLEX 30 MIN: CPT

## 2025-01-01 PROCEDURE — 36415 COLL VENOUS BLD VENIPUNCTURE: CPT

## 2025-01-01 PROCEDURE — 700111 HCHG RX REV CODE 636 W/ 250 OVERRIDE (IP): Mod: JZ | Performed by: STUDENT IN AN ORGANIZED HEALTH CARE EDUCATION/TRAINING PROGRAM

## 2025-01-01 PROCEDURE — 700105 HCHG RX REV CODE 258: Performed by: STUDENT IN AN ORGANIZED HEALTH CARE EDUCATION/TRAINING PROGRAM

## 2025-01-01 PROCEDURE — 85027 COMPLETE CBC AUTOMATED: CPT

## 2025-01-01 PROCEDURE — 84295 ASSAY OF SERUM SODIUM: CPT

## 2025-01-01 PROCEDURE — 83735 ASSAY OF MAGNESIUM: CPT

## 2025-01-01 PROCEDURE — 700102 HCHG RX REV CODE 250 W/ 637 OVERRIDE(OP): Performed by: STUDENT IN AN ORGANIZED HEALTH CARE EDUCATION/TRAINING PROGRAM

## 2025-01-01 PROCEDURE — 770020 HCHG ROOM/CARE - TELE (206)

## 2025-01-01 PROCEDURE — 99233 SBSQ HOSP IP/OBS HIGH 50: CPT | Performed by: STUDENT IN AN ORGANIZED HEALTH CARE EDUCATION/TRAINING PROGRAM

## 2025-01-01 PROCEDURE — 84100 ASSAY OF PHOSPHORUS: CPT

## 2025-01-01 PROCEDURE — 80048 BASIC METABOLIC PNL TOTAL CA: CPT

## 2025-01-01 PROCEDURE — 83880 ASSAY OF NATRIURETIC PEPTIDE: CPT

## 2025-01-01 PROCEDURE — A9270 NON-COVERED ITEM OR SERVICE: HCPCS | Performed by: STUDENT IN AN ORGANIZED HEALTH CARE EDUCATION/TRAINING PROGRAM

## 2025-01-01 PROCEDURE — 97535 SELF CARE MNGMENT TRAINING: CPT

## 2025-01-01 RX ADMIN — LEVOTHYROXINE SODIUM 50 MCG: 0.05 TABLET ORAL at 04:58

## 2025-01-01 RX ADMIN — MIRTAZAPINE 60 MG: 30 TABLET, FILM COATED ORAL at 20:41

## 2025-01-01 RX ADMIN — COLESTIPOL HYDROCHLORIDE 1 G: 1 TABLET, FILM COATED ORAL at 21:13

## 2025-01-01 RX ADMIN — SODIUM CHLORIDE: 9 INJECTION, SOLUTION INTRAVENOUS at 16:26

## 2025-01-01 RX ADMIN — DEUTETRABENAZINE 2 TABLET: 12 TABLET, COATED ORAL at 16:19

## 2025-01-01 RX ADMIN — ENOXAPARIN SODIUM 40 MG: 100 INJECTION SUBCUTANEOUS at 16:19

## 2025-01-01 RX ADMIN — OMEPRAZOLE 20 MG: 20 CAPSULE, DELAYED RELEASE ORAL at 04:58

## 2025-01-01 RX ADMIN — TRAZODONE HYDROCHLORIDE 50 MG: 50 TABLET ORAL at 00:47

## 2025-01-01 RX ADMIN — ACETAMINOPHEN 650 MG: 325 TABLET ORAL at 05:10

## 2025-01-01 RX ADMIN — LISINOPRIL 10 MG: 10 TABLET ORAL at 04:58

## 2025-01-01 RX ADMIN — KETOROLAC TROMETHAMINE 15 MG: 15 INJECTION, SOLUTION INTRAMUSCULAR; INTRAVENOUS at 08:19

## 2025-01-01 RX ADMIN — KETOROLAC TROMETHAMINE 15 MG: 15 INJECTION, SOLUTION INTRAMUSCULAR; INTRAVENOUS at 16:18

## 2025-01-01 RX ADMIN — KETOROLAC TROMETHAMINE 15 MG: 15 INJECTION, SOLUTION INTRAMUSCULAR; INTRAVENOUS at 00:19

## 2025-01-01 RX ADMIN — DEUTETRABENAZINE 2 TABLET: 12 TABLET, COATED ORAL at 04:58

## 2025-01-01 RX ADMIN — ACETAMINOPHEN 650 MG: 325 TABLET ORAL at 15:08

## 2025-01-01 ASSESSMENT — COGNITIVE AND FUNCTIONAL STATUS - GENERAL
WALKING IN HOSPITAL ROOM: A LITTLE
TURNING FROM BACK TO SIDE WHILE IN FLAT BAD: A LITTLE
SUGGESTED CMS G CODE MODIFIER MOBILITY: CK
MOBILITY SCORE: 17
MOVING TO AND FROM BED TO CHAIR: A LITTLE
MOVING FROM LYING ON BACK TO SITTING ON SIDE OF FLAT BED: A LITTLE
CLIMB 3 TO 5 STEPS WITH RAILING: A LOT
STANDING UP FROM CHAIR USING ARMS: A LITTLE

## 2025-01-01 ASSESSMENT — PAIN DESCRIPTION - PAIN TYPE
TYPE: ACUTE PAIN

## 2025-01-01 ASSESSMENT — FIBROSIS 4 INDEX: FIB4 SCORE: 3.44

## 2025-01-01 NOTE — THERAPY
Physical Therapy   Initial Evaluation     Patient Name: Cinda Perrin  Age:  69 y.o., Sex:  female  Medical Record #: [id number]  Today's Date: 1/1/2025     Precautions  Precautions: Fall Risk  Comments: hx Sade's disease    Assessment  Patient is a 69 y.o. female with Sade's disease and hypothyroidism admitted from [facility] for GLF and hyponatremia. PT eval complete, pt currently presents below her functional baseline due to impaired strength, balance, activity tolerance, and overall mobility. Pt is at Min A for STS today and requesting to defer further mobility. Pt demonstrates athetoid movements and poor trunk control associated with Amelia's, pt stating this is all baseline. Pt demonstrates ability to progress while admitted, however would recommend SPV for standing activity at [facility] as pt remains a fall risk. Would recommend SNF if pt would be unsupervised for mobility. Will follow while admitted.     Plan    Physical Therapy Initial Treatment Plan   Treatment Plan : Bed Mobility, Gait Training, Neuro Re-Education / Balance, Self Care / Home Evaluation, Stair Training, Therapeutic Activities, Therapeutic Exercise  Treatment Frequency: 3 Times per Week  Duration: Until Therapy Goals Met    DC Equipment Recommendations: Unable to determine at this time  Discharge Recommendations: Other - (likely HHPT if [facility] can provide supervision for standing activity. would recommend placement if not)         Vitals   O2 (LPM) 0   O2 Delivery Device None - Room Air   Vitals Comments pt reporting feeling lightheaded at EOB, BP stable   Pain 0 - 10 Group   Therapist Pain Assessment   (no c/o pain)   Prior Living Situation   Prior Services Intermittent Physical Support for ADL Per Service   Housing / Facility Assisted Living Residence   Steps Into Home 0   Steps In Home 0   Equipment Owned Front-Wheel Walker   Lives with - Patient's Self Care Capacity Attendant / Paid Care Giver   Comments pt reports having assistance  for showering and meals, but notes she is independent with mobility generally   Prior Level of Functional Mobility   Bed Mobility Independent   Transfer Status Independent   Ambulation Independent   Ambulation Distance household distances   Assistive Devices Used Front-Wheel Walker   History of Falls   History of Falls Yes   Date of Last Fall   (resason for admit)   Cognition    Cognition / Consciousness X   Speech/ Communication Delayed Responses   Level of Consciousness Alert   Comments pleasant and participatory, needing intermittent cues to pay attention   Active ROM Lower Body    Active ROM Lower Body  WDL   Strength Lower Body   Lower Body Strength  X   Comments generally 4/5 bilaterally, pt having difficulty maintaining contractions   Coordination Lower Body    Comments BLE impaired at baseline due to Sade's.   Balance Assessment   Sitting Balance (Static) Fair   Sitting Balance (Dynamic) Fair -   Standing Balance (Static) Poor   Standing Balance (Dynamic) Poor   Weight Shift Sitting Fair   Weight Shift Standing Poor   Comments HHA in standing, pt requesting to hold onto therapist insteady of walker today   Bed Mobility    Supine to Sit Supervised   Sit to Supine Supervised   Scooting Supervised   Gait Analysis   Comments gait NT   Functional Mobility   Sit to Stand Minimal Assist   Mobility STS x1 with HHA   Comments pt wanting to hold onto therapist for support, declined further mobility   6 Clicks Assessment - How much HELP from from another person do you currently need... (If the patient hasn't done an activity recently, how much help from another person do you think he/she would need if he/she tried?)   Turning from your back to your side while in a flat bed without using bedrails? 3   Moving from lying on your back to sitting on the side of a flat bed without using bedrails? 3   Moving to and from a bed to a chair (including a wheelchair)? 3   Standing up from a chair using your arms (e.g.,  wheelchair, or bedside chair)? 3   Walking in hospital room? 3   Climbing 3-5 steps with a railing? 2   6 clicks Mobility Score 17   Short Term Goals    Short Term Goal # 1 pt will complete spt with fww and spv in 6 tx for functional mobility.   Short Term Goal # 2 pt will ambulate 150 ft with fww and spv in 6 tx for household distances.   Education Group   Education Provided Role of Physical Therapist   Role of Physical Therapist Patient Response Patient;Acceptance;Explanation;Verbal Demonstration   Additional Comments education with pt on appropriate AD use, activity progression while admitted, pathology of bed rest   Physical Therapy Initial Treatment Plan    Treatment Plan  Bed Mobility;Gait Training;Neuro Re-Education / Balance;Self Care / Home Evaluation;Stair Training;Therapeutic Activities;Therapeutic Exercise   Treatment Frequency 3 Times per Week   Duration Until Therapy Goals Met   Problem List    Problems Impaired Transfers;Impaired Ambulation;Functional Strength Deficit;Impaired Balance;Impaired Coordination;Decreased Activity Tolerance   Anticipated Discharge Equipment and Recommendations   DC Equipment Recommendations Unable to determine at this time   Discharge Recommendations Other -  (likely HHPT if custodial can provide supervision for standing activity. would recommend placement if not)   Interdisciplinary Plan of Care Collaboration   IDT Collaboration with  Nursing   Patient Position at End of Therapy In Bed;Bed Alarm On;Call Light within Reach;Tray Table within Reach;Phone within Reach   Collaboration Comments RN updated   Session Information   Date / Session Number  1/1- 1 (1/3, 1/7)

## 2025-01-01 NOTE — CARE PLAN
The patient is Watcher - Medium risk of patient condition declining or worsening    Shift Goals  Clinical Goals: monitor electrolyteslabs, pain control  Patient Goals: sleep, no pain  Family Goals: not present    Progress made toward(s) clinical / shift goals:    Problem: Skin Integrity  Goal: Skin integrity is maintained or improved  Outcome: Progressing   Skin integrity and turgor assessed, pressure points and bony prominences assessed by this RN. En score evaluated and appropriate interventions implemented to maintain skin integrity. Pillows for positioning, q2hour turns, Foam protectors, Mobility encouraged, and Pressure re-distribution mattress Education provided on skin integrity and risk for decubitus ulcers. Measures implemented to improve nutrition as needed. CMS assessed. Positioning pad in use to reduce shearing. Wound care administered as ordered, Incontinence management devices in use, and Moisture control measures in use     Problem: Fall Risk  Goal: Patient will remain free from falls  Outcome: Progressing   Fall prevention measures in place, bed alarm on and connected correctly, call light within reach, hourly rounding, Education provided on fall prevention. Pt instructed to use call light prior to exiting the bed, educated on use of bed alarms, and risks and complications related to falls. Medication orders evaluated for fall risk, gait/mobility assessed, sensory deficits assessed, mental status assessed, assessed for hypotension, hypovolemia, weakness, fatigue, elimination, and confusion.     Problem: Pain - Standard  Goal: Alleviation of pain or a reduction in pain to the patient’s comfort goal  Outcome: Progressing   Pt reports pain well controlled with current therapy. Additional non-pharmacologic interventions implemented. Education on pain reduction goals, pain rating scale, and potential side effects of pharmacologic interventions. Demonstrates use of appropriate diversional activities  and/or relaxation techniques.     Patient is not progressing towards the following goals:

## 2025-01-01 NOTE — PROGRESS NOTES
[facility] Medicine Daily Progress Note    Date of Service  1/1/2025    Chief Complaint  Cinda Perrin is a 69 y.o. female admitted 12/30/2024 with fall    Hospital Course  69 y.o. female who presented 12/30/2024 with after ground-level fall.  Patient has a known past medical history of Sade's disease.  Patient states that earlier this evening, she had 2 cocktails.  She subsequently went to the bathroom, where she acutely became weak, and fell down to the ground.  She denies any loss of consciousness.  Denies any head strike.  She did have an episode of emesis as well.  Her  was concerned, so called EMS.      In our emergency department, vital signs stable.  Labs are notable for sodium of 121.  The patient will be admitted for management of hyponatremia     Interval Problem Update  -Notes were extensively reviewed from primary team, radiology, ED, surgery, specialists, etc.   -Reviewed labs to date, microbiology for current admit and prior  -Imaging was independently reviewed and interpreted    Seen patient at bedside  No acute overnight events  Patent's Na down to 120, restarted IVF NS  Discussed with son Joni over the phone. Discussed treatment plans and discharge planning  Continue iv NS  Trending Na  PT/OT  Limit free water restriction    I have discussed this patient's plan of care and discharge plan at IDT rounds today with Case Management, Nursing, Nursing leadership, and other members of the IDT team.    Consultants/Specialty  na    Code Status  DNAR/DNI    Disposition  The patient is not medically cleared for discharge to home or a post-acute facility.      I have placed the appropriate orders for post-discharge needs.    Review of Systems  ROS     Physical Exam  Temp:  [36.5 °C (97.7 °F)-37.1 °C (98.8 °F)] 37 °C (98.6 °F)  Pulse:  [70-86] 77  Resp:  [16-18] 17  BP: (125-168)/(70-89) 152/88  SpO2:  [92 %-97 %] 96 %    Physical Exam    Fluids    Intake/Output Summary (Last 24 hours) at  1/1/2025 1558  Last data filed at 1/1/2025 1537  Gross per 24 hour   Intake 3441 ml   Output 4650 ml   Net -1209 ml        Laboratory  Recent Labs     12/30/24  0604 12/31/24  0129 01/01/25  0137   WBC 16.6* 6.2 7.2   RBC 4.11* 3.94* 3.87*   HEMOGLOBIN 13.0 12.4 12.5   HEMATOCRIT 36.2* 35.9* 35.6*   MCV 88.1 91.1 92.0   MCH 31.6 31.5 32.3   MCHC 35.9* 34.5 35.1   RDW 41.3 45.1 43.7   PLATELETCT 331 279 241   MPV 9.3 9.8 9.6     Recent Labs     12/30/24  0200 12/30/24  0609 12/31/24  0129 12/31/24  0554 12/31/24  1703 01/01/25  0137 01/01/25  0845   SODIUM 121*   < > 130*   < > 126* 120* 124*   POTASSIUM 3.4*  --  3.8  --   --  3.8  --    CHLORIDE 88*  --  98  --   --  89*  --    CO2 18*  --  22  --   --  21  --    GLUCOSE 136*  --  89  --   --  101*  --    BUN 4*  --  8  --   --  8  --    CREATININE 0.89  --  1.13  --   --  1.13  --    CALCIUM 8.9  --  8.9  --   --  9.0  --     < > = values in this interval not displayed.                   Imaging  No orders to display        Assessment/Plan  * Hyponatremia- (present on admission)  Assessment & Plan  Na 121 on presentation  Possibly secondary to hypovolemic hyponatremia  Continue with normal saline at 100cc/hour  Osm serum, Osm urine and Na urine  Improved with IVF  Now dc IVF  Avoid overcorrection more than 6 to 8 mEq over 24 hours     1/1: Na was improved to 130, however down to 120 overnight  Continue with IVF NS  Trending Na  Avoid overcorrection      Weakness  Assessment & Plan  PT/OT evaluation     Hypokalemia- (present on admission)  Assessment & Plan  Replete with IV and PO potassium   Follow up with daily BMPs    Advanced care planning/counseling discussion- (present on admission)  Assessment & Plan  I spent 17 minutes at bedside with patient discussing work-up, results, diagnosis, prognosis.  I do long discussion with the patient in regards to her [place]'s disease and overall prognosis.  This time, patient does not want any life-saving measures  including chest compressions or intubation.   CODE STATUS discussed with patient and wants to be DNR/DNI      Acquired hypothyroidism- (present on admission)  Assessment & Plan  Continue synthroid   Follow up TSH     [place]'s disease (HCC)- (present on admission)  Assessment & Plan  History of  Has outpatient follow up  Continue [place]           VTE prophylaxis: lovenox    I have performed a physical exam and reviewed and updated ROS and Plan today (1/1/2025). In review of yesterday's note (12/31/2024), there are no changes except as documented above.      Patient is has a high medical complexity, complex decision making and is at high risk for complication, morbidity, and mortality.  I spent 52 minutes, reviewing the chart, obtaining and/or reviewing separately obtained history. Performing a medically appropriate examination and evaluation.  Counseling and educating the patient. Ordering and reviewing medications, tests, or procedures.   Documenting clinical information in EPIC. Independently interpreting results and communicating results to patient. Discussing future disposition of care with patient, RN and case management.  This does not include time spent on separately billable procedures/tests.

## 2025-01-01 NOTE — PROGRESS NOTES
Bedside report received from off going RN/tech: Jill assumed care of patient.     Fall Risk Score: HIGH RISK  Fall risk interventions in place: Place yellow fall risk ID band on patient, Provide patient/family education based on risk assessment, Educate patient/family to call staff for assistance when getting out of bed, Place patient in room close to nursing station, Utilize bed/chair fall alarm, and Bed alarm connected correctly  Bed type: Regular (En Score less than 17 interventions in place)  Patient on cardiac monitor: Yes  IVF/IV medications: Infusion per MAR (List Med(s)) NS@50  Oxygen: Room Air  Bedside sitter: Not Applicable   Isolation: Not applicable

## 2025-01-01 NOTE — PROGRESS NOTES
NOC HOSPITALIST CROSS COVER    Notified by RN regarding sodium downtrending from 126 to 120. Prior cross cover APRN restarted IV fluids at 50 mL/hr. Will continue IV fluids and recheck sodium with next lab draw, as it was only on for a short period of time prior to the lab being drawn.       Vitals:    01/01/25 0518   BP: (!) 157/86   Pulse: 71   Resp: 16   Temp: 36.5 °C (97.7 °F)   SpO2: 97%     -----------------------------------------------------------------------------------------------------------    Electronically signed by:  Selena Valdez, BETH, ANTONIA, RIKKI-BC  Hospitalist Services

## 2025-01-01 NOTE — PROGRESS NOTES
[facility] Medicine Daily Progress Note    Date of Service  12/31/2024    Chief Complaint  Cinda Perrin is a 69 y.o. female admitted 12/30/2024 with fall    Hospital Course  69 y.o. female who presented 12/30/2024 with after ground-level fall.  Patient has a known past medical history of [place]'s disease.  Patient states that earlier this evening, she had 2 cocktails.  She subsequently went to the bathroom, where she acutely became weak, and fell down to the ground.  She denies any loss of consciousness.  Denies any head strike.  She did have an episode of emesis as well.  Her  was concerned, so called EMS.      In our emergency department, vital signs stable.  Labs are notable for sodium of 121.  The patient will be admitted for management of hyponatremia     Interval Problem Update  -Notes were extensively reviewed from primary team, radiology, ED, surgery, specialists, etc.   -Reviewed labs to date, microbiology for current admit and prior  -Imaging was independently reviewed and interpreted    Seen patient at bedside  Denies headache, syncope  Reports she fell yesterday  [facility]  Na 131 this am, repeat Na in the pm    I have discussed this patient's plan of care and discharge plan at IDT rounds today with Case Management, Nursing, Nursing leadership, and other members of the IDT team.    Consultants/Specialty  na    Code Status  DNAR/DNI    Disposition  The patient is not medically cleared for discharge to home or a post-acute facility.      I have placed the appropriate orders for post-discharge needs.    Review of Systems  ROS     Physical Exam  Temp:  [36.3 °C (97.3 °F)-36.8 °C (98.2 °F)] 36.7 °C (98.1 °F)  Pulse:  [71-82] 78  Resp:  [16-18] 18  BP: (105-139)/(57-75) 117/57  SpO2:  [92 %-98 %] 98 %    Physical Exam    Fluids    Intake/Output Summary (Last 24 hours) at 12/31/2024 1633  Last data filed at 12/31/2024 1044  Gross per 24 hour    Intake 240 ml   Output 1800 ml   Net -1560 ml        Laboratory  Recent Labs     12/30/24  0604 12/31/24  0129   WBC 16.6* 6.2   RBC 4.11* 3.94*   HEMOGLOBIN 13.0 12.4   HEMATOCRIT 36.2* 35.9*   MCV 88.1 91.1   MCH 31.6 31.5   MCHC 35.9* 34.5   RDW 41.3 45.1   PLATELETCT 331 279   MPV 9.3 9.8     Recent Labs     12/30/24  0200 12/30/24  0609 12/30/24  2203 12/31/24  0129 12/31/24  0554   SODIUM 121*   < > 131* 130* 131*   POTASSIUM 3.4*  --   --  3.8  --    CHLORIDE 88*  --   --  98  --    CO2 18*  --   --  22  --    GLUCOSE 136*  --   --  89  --    BUN 4*  --   --  8  --    CREATININE 0.89  --   --  1.13  --    CALCIUM 8.9  --   --  8.9  --     < > = values in this interval not displayed.                   Imaging  No orders to display        Assessment/Plan  * Hyponatremia- (present on admission)  Assessment & Plan  Na 121 on presentation  Possibly secondary to hypovolemic hyponatremia  Continue with normal saline at 100cc/hour  Osm serum, Osm urine and Na urine  Improved with IVF  Now dc IVF  Avoid overcorrection more than 6 to 8 mEq over 24 hours         Weakness  Assessment & Plan  PT/OT evaluation     Hypokalemia- (present on admission)  Assessment & Plan  Replete with IV and PO potassium   Follow up with daily BMPs    Advanced care planning/counseling discussion- (present on admission)  Assessment & Plan  I spent 17 minutes at bedside with patient discussing work-up, results, diagnosis, prognosis.  I do long discussion with the patient in regards to her Sade's disease and overall prognosis.  This time, patient does not want any life-saving measures including chest compressions or intubation.   CODE STATUS discussed with patient and wants to be DNR/DNI      Acquired hypothyroidism- (present on admission)  Assessment & Plan  Continue synthroid   Follow up TSH     [place]'s disease (HCC)- (present on admission)  Assessment & Plan  History of  Has outpatient follow up  Continue [place]           VTE  prophylaxis: lovenox    I have performed a physical exam and reviewed and updated ROS and Plan today (12/31/2024). In review of yesterday's note (12/30/2024), there are no changes except as documented above.      Patient is has a high medical complexity, complex decision making and is at high risk for complication, morbidity, and mortality.  I spent 51 minutes, reviewing the chart, obtaining and/or reviewing separately obtained history. Performing a medically appropriate examination and evaluation.  Counseling and educating the patient. Ordering and reviewing medications, tests, or procedures.   Documenting clinical information in EPIC. Independently interpreting results and communicating results to patient. Discussing future disposition of care with patient, RN and case management.  This does not include time spent on separately billable procedures/tests.

## 2025-01-01 NOTE — THERAPY
"Occupational Therapy   Initial Evaluation     Patient Name: Cinda Perrin  Age:  69 y.o., Sex:  female  Medical Record #: [id number]  Today's Date: 12/31/2024       Precautions: Fall Risk  Comments: Huntingtons Disease    Assessment  Patient is 69 y.o. female who presented 12/30/2024 after ground-level fall.  Patient has a known past medical history of [place]'s disease.  Pt with hypovolemic hyponatremia.  Pt presented today with impaired ADL's, limited functional mobility & was very fearful of falling given her recent history of falls. Pt noted to have various bruises of different stages of healing.  Pt does have Sade's disease but stated she has been able to amb with her FWW to the dinning room fro meals at her [facility].  Unclear how much assist her [facility] can provide to her at D/C.  Recommend Acute OT services.  Pt will benefit from HH therapy & assist from her FE[facility] at D/C.    Plan    Occupational Therapy Initial Treatment Plan   Treatment Interventions: Self Care / Activities of Daily Living, Adaptive Equipment, Neuro Re-Education / Balance, Therapeutic Exercises, Therapeutic Activity  Treatment Frequency: 3 Times per Week  Duration: Until Therapy Goals Met    DC Equipment Recommendations: Unable to determine at this time  Discharge Recommendations: Recommend home health for continued occupational therapy services     Subjective    \"I need some pain meds, my hip is hurting\"     Objective      Initial Contact Note    Initial Contact Note Order Received and Verified, Occupational Therapy Evaluation in Progress with Full Report to Follow.   Prior Living Situation   Prior Services Intermittent Physical Support for ADL Per Service   Housing / Facility Assisted Living Residence  ([facility])   Steps Into Home 0   Steps In Home 0   Elevator Yes   Bathroom Set up Walk In Shower;Grab Bars;Shower Chair   Equipment Owned Front-Wheel Walker;Tub / Shower Seat;Grab Bar(s) In Tub / Shower;Grab Bar(s) By Toilet "   Lives with - Patient's Self Care Capacity Attendant / Paid Care Giver   Comments Pt states she receives help from her FEI with showers but has been able to amb with her FWW to the dinning room   Prior Level of ADL Function   Self Feeding Independent   Grooming / Hygiene Independent   Bathing Requires Assist   Dressing Independent   Toileting Independent   Prior Level of IADL Function   Medication Management Requires Assist   Laundry Requires Assist   Kitchen Mobility Requires Assist   Finances Requires Assist   Home Management Requires Assist   Shopping Requires Assist   Prior Level Of Mobility Supervision With Device in Home   History of Falls   History of Falls Yes   Date of Last Fall 12/30/24  (reason for admit)   Precautions   Precautions Fall Risk   Comments Huntingtons Disease   Vitals   O2 Delivery Device None - Room Air   Pain   Pain Scales 0 to 10 Scale    Intervention Ambulation / Increased Activity   Pain 0 - 10 Group   Location Hip;Leg;Back   Location Orientation Right   Description Sharp;Sore   Therapist Pain Assessment During Activity;Nurse Notified;6   Cognition    Cognition / Consciousness WDL   Level of Consciousness Alert   Comments Pt was anxious & fearful of falling & was reluctant to get OOB   Passive ROM Upper Body   Passive ROM Upper Body WDL   Active ROM Upper Body   Active ROM Upper Body  WDL   Dominant Hand Right   Strength Upper Body   Upper Body Strength  WDL   Sensation Upper Body   Upper Extremity Sensation  WDL   Neurological Concerns   Neurological Concerns Yes   Rt Upper Extremity Gross Motor Control Impaired;Ataxic   Rt Upper Extremity Fine Motor Control Impaired   Rt Upper Extremity Functional Use Impaired   Right In Hand Manipulation Impaired   Lt Upper Extremity Gross Motor Control Impaired;Ataxic   Lt Upper Extremity Fine Motor Control Impaired   Lt Upper Extremity Functional Use Impaired   Left In Hand Manipulation Impaired   Coordination Upper Body   Coordination X   Fine  Motor Coordination impaired BUE   Gross Motor Coordination impaired BUE   Comments Pt has [place]'s disease wtih uncontrolled athetoid movements   Balance Assessment   Sitting Balance (Static) Fair -   Sitting Balance (Dynamic) Poor +   Standing Balance (Static) Fair -   Standing Balance (Dynamic) Poor +   Weight Shift Sitting Fair   Weight Shift Standing Poor   Comments pt fearful of falling with standing   Bed Mobility    Supine to Sit Supervised   Sit to Supine Supervised   Scooting Supervised   Rolling Supervised   Comments pt moves very quickly/impulsively   ADL Assessment   Eating Modified Independent   Grooming Supervision;Seated   Bathing Minimal Assist   Upper Body Dressing Supervision   Lower Body Dressing Contact Guard Assist   Toileting Contact Guard Assist   Functional Mobility   Sit to Stand Contact Guard Assist   Bed, Chair, Wheelchair Transfer Contact Guard Assist   Toilet Transfers Contact Guard Assist   Transfer Method Stand Step   Mobility pt fearful to take too many steps with FWW   Activity Tolerance   Sitting Edge of Bed 10   Standing 2   Patient / Family Goals   Patient / Family Goal #1 To have less pain   Short Term Goals   Short Term Goal # 1 Pt will be supervised with ADL transfers   Short Term Goal # 2 Pt will dress LB with set up   Short Term Goal # 3 Pt will eat all meals up in chair   Education Group   Role of Occupational Therapist Patient Response Patient;Acceptance;Explanation;Verbal Demonstration   Occupational Therapy Initial Treatment Plan    Treatment Interventions Self Care / Activities of Daily Living;Adaptive Equipment;Neuro Re-Education / Balance;Therapeutic Exercises;Therapeutic Activity   Treatment Frequency 3 Times per Week   Duration Until Therapy Goals Met   Problem List   Problem List Decreased Active Daily Living Skills;Decreased Functional Mobility;Decreased Activity Tolerance;Impaired Posture / Trunk Alignment;Impaired Postural Control / Balance;Other  (Comments)  ([place]'s disease)   Anticipated Discharge Equipment and Recommendations   DC Equipment Recommendations Unable to determine at this time   Discharge Recommendations Recommend home health for continued occupational therapy services   Interdisciplinary Plan of Care Collaboration   IDT Collaboration with  Nursing   Patient Position at End of Therapy In Bed;Bed Alarm On;Call Light within Reach;Tray Table within Reach;Phone within Reach   Collaboration Comments Nsg notified of OT findings   Session Information   Date / Session Number  12/31 #1 (1/3, 1/6)

## 2025-01-02 ENCOUNTER — PHARMACY VISIT (OUTPATIENT)
Dept: PHARMACY | Facility: MEDICAL CENTER | Age: 70
End: 2025-01-02
Payer: COMMERCIAL

## 2025-01-02 VITALS
OXYGEN SATURATION: 93 % | HEART RATE: 81 BPM | HEIGHT: 66 IN | RESPIRATION RATE: 18 BRPM | WEIGHT: 143.3 LBS | SYSTOLIC BLOOD PRESSURE: 150 MMHG | DIASTOLIC BLOOD PRESSURE: 79 MMHG | BODY MASS INDEX: 23.03 KG/M2 | TEMPERATURE: 98.6 F

## 2025-01-02 LAB
ANION GAP SERPL CALC-SCNC: 9 MMOL/L (ref 7–16)
BUN SERPL-MCNC: 8 MG/DL (ref 8–22)
CALCIUM SERPL-MCNC: 8.6 MG/DL (ref 8.5–10.5)
CHLORIDE SERPL-SCNC: 100 MMOL/L (ref 96–112)
CO2 SERPL-SCNC: 24 MMOL/L (ref 20–33)
CREAT SERPL-MCNC: 0.91 MG/DL (ref 0.5–1.4)
ERYTHROCYTE [DISTWIDTH] IN BLOOD BY AUTOMATED COUNT: 43.9 FL (ref 35.9–50)
GFR SERPLBLD CREATININE-BSD FMLA CKD-EPI: 68 ML/MIN/1.73 M 2
GLUCOSE SERPL-MCNC: 91 MG/DL (ref 65–99)
HCT VFR BLD AUTO: 37.2 % (ref 37–47)
HGB BLD-MCNC: 12.8 G/DL (ref 12–16)
MCH RBC QN AUTO: 31.6 PG (ref 27–33)
MCHC RBC AUTO-ENTMCNC: 34.4 G/DL (ref 32.2–35.5)
MCV RBC AUTO: 91.9 FL (ref 81.4–97.8)
PLATELET # BLD AUTO: 276 K/UL (ref 164–446)
PMV BLD AUTO: 9.6 FL (ref 9–12.9)
POTASSIUM SERPL-SCNC: 4.4 MMOL/L (ref 3.6–5.5)
RBC # BLD AUTO: 4.05 M/UL (ref 4.2–5.4)
SODIUM SERPL-SCNC: 133 MMOL/L (ref 135–145)
SODIUM SERPL-SCNC: 135 MMOL/L (ref 135–145)
WBC # BLD AUTO: 5 K/UL (ref 4.8–10.8)

## 2025-01-02 PROCEDURE — 99239 HOSP IP/OBS DSCHRG MGMT >30: CPT | Performed by: STUDENT IN AN ORGANIZED HEALTH CARE EDUCATION/TRAINING PROGRAM

## 2025-01-02 PROCEDURE — A9270 NON-COVERED ITEM OR SERVICE: HCPCS | Performed by: STUDENT IN AN ORGANIZED HEALTH CARE EDUCATION/TRAINING PROGRAM

## 2025-01-02 PROCEDURE — 85027 COMPLETE CBC AUTOMATED: CPT

## 2025-01-02 PROCEDURE — 80048 BASIC METABOLIC PNL TOTAL CA: CPT

## 2025-01-02 PROCEDURE — 700105 HCHG RX REV CODE 258: Performed by: STUDENT IN AN ORGANIZED HEALTH CARE EDUCATION/TRAINING PROGRAM

## 2025-01-02 PROCEDURE — 700102 HCHG RX REV CODE 250 W/ 637 OVERRIDE(OP): Performed by: STUDENT IN AN ORGANIZED HEALTH CARE EDUCATION/TRAINING PROGRAM

## 2025-01-02 PROCEDURE — 36415 COLL VENOUS BLD VENIPUNCTURE: CPT

## 2025-01-02 PROCEDURE — RXMED WILLOW AMBULATORY MEDICATION CHARGE: Performed by: STUDENT IN AN ORGANIZED HEALTH CARE EDUCATION/TRAINING PROGRAM

## 2025-01-02 PROCEDURE — 84295 ASSAY OF SERUM SODIUM: CPT

## 2025-01-02 RX ORDER — SODIUM CHLORIDE 1 G/1
1 TABLET ORAL 2 TIMES DAILY WITH MEALS
Status: DISCONTINUED | OUTPATIENT
Start: 2025-01-02 | End: 2025-01-02 | Stop reason: HOSPADM

## 2025-01-02 RX ORDER — SODIUM CHLORIDE 1 G/1
1 TABLET ORAL 2 TIMES DAILY WITH MEALS
Qty: 60 TABLET | Refills: 0 | Status: SHIPPED | OUTPATIENT
Start: 2025-01-02 | End: 2025-02-01

## 2025-01-02 RX ORDER — SODIUM CHLORIDE 1 G/1
1 TABLET ORAL
Status: DISCONTINUED | OUTPATIENT
Start: 2025-01-02 | End: 2025-01-02

## 2025-01-02 RX ADMIN — DEUTETRABENAZINE 2 TABLET: 12 TABLET, COATED ORAL at 05:14

## 2025-01-02 RX ADMIN — SODIUM CHLORIDE 1 G: 1 TABLET ORAL at 09:05

## 2025-01-02 RX ADMIN — SODIUM CHLORIDE: 9 INJECTION, SOLUTION INTRAVENOUS at 05:18

## 2025-01-02 RX ADMIN — OMEPRAZOLE 20 MG: 20 CAPSULE, DELAYED RELEASE ORAL at 05:13

## 2025-01-02 RX ADMIN — LEVOTHYROXINE SODIUM 50 MCG: 0.05 TABLET ORAL at 05:13

## 2025-01-02 RX ADMIN — LISINOPRIL 10 MG: 10 TABLET ORAL at 05:13

## 2025-01-02 RX ADMIN — ACETAMINOPHEN 650 MG: 325 TABLET ORAL at 05:13

## 2025-01-02 ASSESSMENT — FIBROSIS 4 INDEX: FIB4 SCORE: 3.44

## 2025-01-02 ASSESSMENT — PAIN DESCRIPTION - PAIN TYPE
TYPE: ACUTE PAIN
TYPE: ACUTE PAIN

## 2025-01-02 NOTE — PROGRESS NOTES
MONITOR SUMMARY:     Rhythm: NSR  Rate: 70s - 8)s  Ectopy: (r)PAC  Measurements: .14/06/.41             Spoke with pt and went over medications to hold prior to procedure. Pt also mentioned not sure if the colonoscopy portion is needed, writer encouraged pt to discuss with her PCP before making a decision, pt agreeable. No further questions at this time.

## 2025-01-02 NOTE — CARE PLAN
The patient is Watcher - Medium risk of patient condition declining or worsening    Shift Goals  Clinical Goals: Stable hemodynamics, monitor electrolytes  Patient Goals: Sleep  Family Goals: not present    Progress made toward(s) clinical / shift goals:    Problem: Knowledge Deficit - Standard  Goal: Patient and family/care givers will demonstrate understanding of plan of care, disease process/condition, diagnostic tests and medications  Outcome: Progressing     Problem: Skin Integrity  Goal: Skin integrity is maintained or improved  Outcome: Progressing     Problem: Fall Risk  Goal: Patient will remain free from falls  Outcome: Progressing     Problem: Pain - Standard  Goal: Alleviation of pain or a reduction in pain to the patient’s comfort goal  Outcome: Progressing       Patient is not progressing towards the following goals:

## 2025-01-02 NOTE — DISCHARGE PLANNING
Case Management Discharge Planning    Admission Date: 12/30/2024  GMLOS: 2.6  ALOS: 3    6-Clicks ADL Score: 21  6-Clicks Mobility Score: 17  PT and/or OT Eval ordered: Yes  PT/OT:recommending HH  Post-acute Referrals Ordered: Yes  Post-acute Choice Obtained: Yes  Has referral(s) been sent to post-acute provider:  Yes      Anticipated Discharge Dispo: Discharge Disposition: D/T to home under [facility] care in anticipation of covered skilled care (06)  Discharge Address: [address] [place] [address] [address] [place] [id number]    DME Needed: No    Action(s) Taken: LMSW called pt's son and left VM to clarify transportation. [facility] services.     1216 LMSW called pt's son Joni and pt needs transportation home. Pt has Medicare. LMSW will submit ride line request.

## 2025-01-02 NOTE — CARE PLAN
The patient is Stable - Low risk of patient condition declining or worsening    Shift Goals  Clinical Goals: improve mobility, monitor labs  Patient Goals: sleep  Family Goals: not present    Progress made toward(s) clinical / shift goals:    Problem: Knowledge Deficit - Standard  Goal: Patient and family/care givers will demonstrate understanding of plan of care, disease process/condition, diagnostic tests and medications  Outcome: Progressing     Problem: Skin Integrity  Goal: Skin integrity is maintained or improved  Outcome: Progressing     Problem: Fall Risk  Goal: Patient will remain free from falls  Outcome: Progressing     Problem: Pain - Standard  Goal: Alleviation of pain or a reduction in pain to the patient’s comfort goal  Outcome: Progressing       Patient is not progressing towards the following goals:

## 2025-01-02 NOTE — DISCHARGE INSTRUCTIONS
Discharge Instructions per Shamir Anderson M.D.    Please follow-up with PCP as outpatient   Take sodium tablet 1 tab twice a day and follow up with PCP to repeat serum sodium level in one week  Avoid free water intake    Return to ER in the event of new or worsening symptoms. Please note importance of compliance and the patient has agreed to proceed with all medical recommendations and follow up plan indicated above. All medications come with benefits and risks. Risks may include permanent injury or death and these risks can be minimized with close reassessment and monitoring. Please make it to your scheduled follow ups with PCP

## 2025-01-02 NOTE — PROGRESS NOTES
Pt dc'd in stable and satisfactory fashion. IV and monitor removed; monitor room notified. Pt left unit via gurney with [facility]. Personal belongings with pt when leaving unit. Pt given discharge instructions prior to leaving unit including where to  prescriptions and when to follow-up; verbalizes understanding. Copy of discharge instructions with pt and in the chart.

## 2025-01-02 NOTE — PROGRESS NOTES
Bedside report received from off going RN/tech: NEW Jarvis, assumed care of patient.     Fall Risk Score: HIGH RISK  Fall risk interventions in place: Place yellow fall risk ID band on patient, Provide patient/family education based on risk assessment, Educate patient/family to call staff for assistance when getting out of bed, Place fall precaution signage outside patient door, Utilize bed/chair fall alarm, Notify charge of high risk for huddle, and Bed alarm connected correctly  Bed type: Regular (En Score less than 17 interventions in place)  Patient on cardiac monitor: Yes  IVF/IV medications: Infusion per MAR (List Med(s)) NS 75  Oxygen: Room Air  Bedside sitter: Not Applicable   Isolation: Not applicable

## 2025-01-02 NOTE — DISCHARGE PLANNING
DC Transport Scheduled    Transport Company Scheduled:  [employer]  Spoke with Marina at [facility] to schedule transport.    Scheduled Date: 1/2/2025  Scheduled Time: 1330    Transport Type: [place]  Destination: [place]   Destination address: [address] [place] [address] [id number] [facility] [id number]    Notified care team of scheduled transport via [employer].     If there are any changes needed to the DC transportation scheduled, please contact [facility] at ext. [phone] between the hours of 9999-8166. If outside those hours, contact the ED Case Manager at ext. [phone].

## 2025-01-02 NOTE — DISCHARGE SUMMARY
Discharge Summary    CHIEF COMPLAINT ON ADMISSION  Chief Complaint   Patient presents with    N/V     Pt states she drank two rum and cokes this afternoon. Pt states she starting vomiting and fell in the bathroom. Denies LOC or head strike, -thinners.          Reason for Admission  ETOH     Admission Date  12/30/2024    CODE STATUS  DNAR/DNI    HPI & HOSPITAL COURSE  This is a 69 y.o. female who presented 12/30/2024 with after ground-level fall.  Patient has a known past medical history of Anson's disease.  Patient states that earlier this evening, she had 2 cocktails.  She subsequently went to the bathroom, where she acutely became weak, and fell down to the ground.  She denies any loss of consciousness.  Denies any head strike.  She did have an episode of emesis as well.       Here labs are notable for sodium of 121.  Patient has received IV fluid.  Her sodium has been slowly improving to 135 this morning.  She is advised to limit free water intake. Salt tab 1 tab twice a day was started. Patient is advised to follow up with PCP in one week to repeat serum Na level. Patient and her son voiced understanding.     Therefore, she is discharged in good and stable condition to home with organized home healthcare and close outpatient follow-up.    The patient met 2-midnight criteria for an inpatient stay at the time of discharge.    Discharge Date  1/2/25    FOLLOW UP ITEMS POST DISCHARGE  PCP in one week    DISCHARGE DIAGNOSES  Principal Problem:    Hyponatremia (Chronic) (POA: Yes)  Active Problems:    Anson's disease (HCC) (Chronic) (POA: Yes)      Overview: 6/11/2024: Chronic condition.  Patient is currently on Austedo and       amantadine.  Patient will be seeing [facility]y to establish care in       [place].  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  Patient's Anson disease had been  well controlled with       Austedo however insurance coverage lapsed for two months and she had       instead been on haldol until Austedo was reinstated two weeks prior       hospitalization on 5/13/2024 with nausea and vomiting and worsening       underlying Chorea.  Telemetry neurology was consulted and patient was       recommended adding oral amantadine and giving patient as needed Ativan for       bharath in addition to patient's current medication.  I instructed patient       and son Devin to follow-up with neurology for ongoing monitoring and       management of patient's Pike disease.  Will follow neurologist plan       of care. Patient and Alycia KELLOGG aware that dean's disease has no       cure but medicine, physical therapy and speech therapy can be helpful       manage some symptoms.     Acquired hypothyroidism (Chronic) (POA: Yes)      Overview: 6/11/2024: Patient is clinically euthyroid with levothyroxine 50 mcg p.o.       daily doses.  TSH 0.71 from 5/13/2024 at [facility].  Will       monitor thyroid panel periodically and consider levothyroxine dose       adjustment if needed    Advanced care planning/counseling discussion (Chronic) (POA: Yes)    Hypokalemia (Chronic) (POA: Yes)    Weakness (POA: Unknown)  Resolved Problems:    * No resolved hospital problems. *      FOLLOW UP  No future appointments.  HENRIETTA BarberRRashidN.  [address] [facility] [address]  [phone]    Follow up in 1 week(s)        MEDICATIONS ON DISCHARGE     Medication List        START taking these medications        Instructions   sodium chloride 1 GM Tabs  Commonly known as: Salt   Take 1 Tablet by mouth 2 times a day with meals for 30 days.  Dose: 1 g            CONTINUE taking these medications        Instructions   Austedo 12 MG Tabs  Generic drug: Deutetrabenazine   Take 2 Tablets by mouth 2 times a day. Indications: Pike's Chorea  Dose: 2 Tablet     calcium carbonate 1250 (500 Ca) MG  Tabs  Commonly known as: Os-Galdino 500   Take 500 mg by mouth every day. Indications: Low Amount of Calcium in the Blood  Dose: 500 mg     cholecalciferol 5000 UNIT Caps  Commonly known as: Vitamin D3   Take 5,000 Units by mouth every day.  Dose: 5,000 Units     colestipol 1 GM Tabs  Commonly known as: Colestid   Take 1 Tablet by mouth every evening.  Dose: 1 g     levothyroxine 50 MCG Tabs  Commonly known as: Synthroid   Take 1 Tablet by mouth every morning on an empty stomach. Indications: Underactive Thyroid  Dose: 50 mcg     lisinopril 10 MG Tabs  Commonly known as: Prinivil   Take 1 Tablet by mouth every day. Indications: High Blood Pressure  Dose: 10 mg     mirtazapine 30 MG Tabs tablet  Commonly known as: Remeron   Take 2 Tablets by mouth 1/2 hour after dinner.  Dose: 60 mg     pantoprazole 20 MG tablet  Commonly known as: Protonix   Take 1 Tablet by mouth every day. Before breakfast  Dose: 20 mg     potassium chloride ER 10 MEQ tablet  Commonly known as: Klor-Con   Take 1 Tablet by mouth every day.  Dose: 10 mEq     traZODone 50 MG Tabs  Commonly known as: Desyrel   Take 1 Tablet by mouth at bedtime as needed for Sleep or Depression. Indications: Trouble Sleeping  Dose: 50 mg              Allergies  Allergies   Allergen Reactions    Codeine Unspecified     Intake    Oxycodone Rash       DIET  Orders Placed This Encounter   Procedures    Diet Order Diet: Regular; Fluid modifications: (optional): Free Water Restrictions Only     Standing Status:   Standing     Number of Occurrences:   1     Order Specific Question:   Diet:     Answer:   Regular [1]     Order Specific Question:   Fluid modifications: (optional)     Answer:   Free Water Restrictions Only [12]       ACTIVITY  As tolerated.  Weight bearing as tolerated    CONSULTATIONS  na    PROCEDURES  na    LABORATORY  Lab Results   Component Value Date    SODIUM 133 (L) 01/02/2025    POTASSIUM 4.4 01/02/2025    CHLORIDE 100 01/02/2025    CO2 24 01/02/2025     GLUCOSE 91 01/02/2025    BUN 8 01/02/2025    CREATININE 0.91 01/02/2025        Lab Results   Component Value Date    WBC 5.0 01/02/2025    HEMOGLOBIN 12.8 01/02/2025    HEMATOCRIT 37.2 01/02/2025    PLATELETCT 276 01/02/2025      No orders to display       Total time of the discharge process 35 minutes.

## 2025-01-03 ENCOUNTER — APPOINTMENT (OUTPATIENT)
Dept: RADIOLOGY | Facility: MEDICAL CENTER | Age: 70
DRG: 565 | End: 2025-01-03
Attending: EMERGENCY MEDICINE
Payer: MEDICARE

## 2025-01-03 ENCOUNTER — HOSPITAL ENCOUNTER (INPATIENT)
Facility: MEDICAL CENTER | Age: 70
LOS: 6 days | DRG: 565 | End: 2025-01-09
Attending: EMERGENCY MEDICINE | Admitting: INTERNAL MEDICINE
Payer: MEDICARE

## 2025-01-03 ENCOUNTER — APPOINTMENT (OUTPATIENT)
Dept: RADIOLOGY | Facility: MEDICAL CENTER | Age: 70
DRG: 565 | End: 2025-01-03
Attending: INTERNAL MEDICINE
Payer: MEDICARE

## 2025-01-03 DIAGNOSIS — G10 HUNTINGTON'S DISEASE (HCC): Chronic | ICD-10-CM

## 2025-01-03 DIAGNOSIS — R53.1 WEAKNESS: ICD-10-CM

## 2025-01-03 DIAGNOSIS — R53.1 ACUTE WEAKNESS: ICD-10-CM

## 2025-01-03 DIAGNOSIS — M21.379 FOOT-DROP, UNSPECIFIED LATERALITY: ICD-10-CM
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
## 2025-01-03 PROBLEM — R74.01 TRANSAMINITIS: Status: ACTIVE | Noted: 2025-01-03

## 2025-01-03 LAB
ABO + RH BLD: NORMAL
ABO GROUP BLD: NORMAL
ALBUMIN SERPL BCP-MCNC: 3.9 G/DL (ref 3.2–4.9)
ALBUMIN/GLOB SERPL: 1.2 G/DL
ALP SERPL-CCNC: 199 U/L (ref 30–99)
ALT SERPL-CCNC: 56 U/L (ref 2–50)
ANION GAP SERPL CALC-SCNC: 12 MMOL/L (ref 7–16)
APTT PPP: 24.5 SEC (ref 24.7–36)
AST SERPL-CCNC: 81 U/L (ref 12–45)
BASOPHILS # BLD AUTO: 0.6 % (ref 0–1.8)
BASOPHILS # BLD: 0.03 K/UL (ref 0–0.12)
BILIRUB SERPL-MCNC: 0.6 MG/DL (ref 0.1–1.5)
BLD GP AB SCN SERPL QL: NORMAL
BUN SERPL-MCNC: 8 MG/DL (ref 8–22)
CALCIUM ALBUM COR SERPL-MCNC: 10.2 MG/DL (ref 8.5–10.5)
CALCIUM SERPL-MCNC: 10.1 MG/DL (ref 8.5–10.5)
CHLORIDE SERPL-SCNC: 98 MMOL/L (ref 96–112)
CO2 SERPL-SCNC: 26 MMOL/L (ref 20–33)
CREAT SERPL-MCNC: 1.1 MG/DL (ref 0.5–1.4)
EKG IMPRESSION: NORMAL
EOSINOPHIL # BLD AUTO: 0.08 K/UL (ref 0–0.51)
EOSINOPHIL NFR BLD: 1.5 % (ref 0–6.9)
ERYTHROCYTE [DISTWIDTH] IN BLOOD BY AUTOMATED COUNT: 47.9 FL (ref 35.9–50)
EST. AVERAGE GLUCOSE BLD GHB EST-MCNC: 94 MG/DL
GFR SERPLBLD CREATININE-BSD FMLA CKD-EPI: 54 ML/MIN/1.73 M 2
GLOBULIN SER CALC-MCNC: 3.2 G/DL (ref 1.9–3.5)
GLUCOSE SERPL-MCNC: 107 MG/DL (ref 65–99)
HBA1C MFR BLD: 4.9 % (ref 4–5.6)
HCT VFR BLD AUTO: 42.7 % (ref 37–47)
HGB BLD-MCNC: 14.8 G/DL (ref 12–16)
IMM GRANULOCYTES # BLD AUTO: 0.02 K/UL (ref 0–0.11)
IMM GRANULOCYTES NFR BLD AUTO: 0.4 % (ref 0–0.9)
INR PPP: 0.96 (ref 0.87–1.13)
LYMPHOCYTES # BLD AUTO: 0.89 K/UL (ref 1–4.8)
LYMPHOCYTES NFR BLD: 16.5 % (ref 22–41)
MCH RBC QN AUTO: 32.1 PG (ref 27–33)
MCHC RBC AUTO-ENTMCNC: 34.7 G/DL (ref 32.2–35.5)
MCV RBC AUTO: 92.6 FL (ref 81.4–97.8)
MONOCYTES # BLD AUTO: 0.55 K/UL (ref 0–0.85)
MONOCYTES NFR BLD AUTO: 10.2 % (ref 0–13.4)
NEUTROPHILS # BLD AUTO: 3.83 K/UL (ref 1.82–7.42)
NEUTROPHILS NFR BLD: 70.8 % (ref 44–72)
NRBC # BLD AUTO: 0 K/UL
NRBC BLD-RTO: 0 /100 WBC (ref 0–0.2)
PLATELET # BLD AUTO: 365 K/UL (ref 164–446)
PMV BLD AUTO: 9.3 FL (ref 9–12.9)
POTASSIUM SERPL-SCNC: 4.6 MMOL/L (ref 3.6–5.5)
PROT SERPL-MCNC: 7.1 G/DL (ref 6–8.2)
PROTHROMBIN TIME: 12.7 SEC (ref 12–14.6)
RBC # BLD AUTO: 4.61 M/UL (ref 4.2–5.4)
RH BLD: NORMAL
SODIUM SERPL-SCNC: 136 MMOL/L (ref 135–145)
TROPONIN T SERPL-MCNC: 7 NG/L (ref 6–19)
WBC # BLD AUTO: 5.4 K/UL (ref 4.8–10.8)

## 2025-01-03 PROCEDURE — 93005 ELECTROCARDIOGRAM TRACING: CPT | Mod: TC | Performed by: EMERGENCY MEDICINE

## 2025-01-03 PROCEDURE — 700102 HCHG RX REV CODE 250 W/ 637 OVERRIDE(OP): Performed by: INTERNAL MEDICINE

## 2025-01-03 PROCEDURE — 85610 PROTHROMBIN TIME: CPT

## 2025-01-03 PROCEDURE — 86901 BLOOD TYPING SEROLOGIC RH(D): CPT

## 2025-01-03 PROCEDURE — 86900 BLOOD TYPING SEROLOGIC ABO: CPT | Mod: 91

## 2025-01-03 PROCEDURE — 85730 THROMBOPLASTIN TIME PARTIAL: CPT

## 2025-01-03 PROCEDURE — 70496 CT ANGIOGRAPHY HEAD: CPT

## 2025-01-03 PROCEDURE — 71045 X-RAY EXAM CHEST 1 VIEW: CPT

## 2025-01-03 PROCEDURE — 99285 EMERGENCY DEPT VISIT HI MDM: CPT

## 2025-01-03 PROCEDURE — A9270 NON-COVERED ITEM OR SERVICE: HCPCS | Performed by: INTERNAL MEDICINE

## 2025-01-03 PROCEDURE — 86850 RBC ANTIBODY SCREEN: CPT

## 2025-01-03 PROCEDURE — 84484 ASSAY OF TROPONIN QUANT: CPT

## 2025-01-03 PROCEDURE — 83036 HEMOGLOBIN GLYCOSYLATED A1C: CPT

## 2025-01-03 PROCEDURE — 36415 COLL VENOUS BLD VENIPUNCTURE: CPT

## 2025-01-03 PROCEDURE — 99223 1ST HOSP IP/OBS HIGH 75: CPT | Performed by: INTERNAL MEDICINE

## 2025-01-03 PROCEDURE — 0042T CT-CEREBRAL PERFUSION ANALYSIS: CPT

## 2025-01-03 PROCEDURE — 70450 CT HEAD/BRAIN W/O DYE: CPT

## 2025-01-03 PROCEDURE — 70551 MRI BRAIN STEM W/O DYE: CPT

## 2025-01-03 PROCEDURE — 85025 COMPLETE CBC W/AUTO DIFF WBC: CPT

## 2025-01-03 PROCEDURE — 770020 HCHG ROOM/CARE - TELE (206)

## 2025-01-03 PROCEDURE — 70498 CT ANGIOGRAPHY NECK: CPT

## 2025-01-03 PROCEDURE — 80053 COMPREHEN METABOLIC PANEL: CPT

## 2025-01-03 PROCEDURE — 700117 HCHG RX CONTRAST REV CODE 255: Performed by: EMERGENCY MEDICINE

## 2025-01-03 PROCEDURE — 700111 HCHG RX REV CODE 636 W/ 250 OVERRIDE (IP): Mod: JZ | Performed by: INTERNAL MEDICINE

## 2025-01-03 RX ORDER — PANTOPRAZOLE SODIUM 20 MG/1
20 TABLET, DELAYED RELEASE ORAL DAILY
Status: DISCONTINUED | OUTPATIENT
Start: 2025-01-03 | End: 2025-01-03

## 2025-01-03 RX ORDER — POTASSIUM CHLORIDE 750 MG/1
10 TABLET, EXTENDED RELEASE ORAL DAILY
Status: DISCONTINUED | OUTPATIENT
Start: 2025-01-04 | End: 2025-01-09 | Stop reason: HOSPADM

## 2025-01-03 RX ORDER — ONDANSETRON 2 MG/ML
4 INJECTION INTRAMUSCULAR; INTRAVENOUS EVERY 4 HOURS PRN
Status: DISCONTINUED | OUTPATIENT
Start: 2025-01-03 | End: 2025-01-09 | Stop reason: HOSPADM

## 2025-01-03 RX ORDER — ASPIRIN 300 MG/1
300 SUPPOSITORY RECTAL DAILY
Status: DISCONTINUED | OUTPATIENT
Start: 2025-01-03 | End: 2025-01-04

## 2025-01-03 RX ORDER — MIRTAZAPINE 15 MG/1
60 TABLET, FILM COATED ORAL
Status: DISCONTINUED | OUTPATIENT
Start: 2025-01-03 | End: 2025-01-09 | Stop reason: HOSPADM

## 2025-01-03 RX ORDER — ATORVASTATIN CALCIUM 40 MG/1
40 TABLET, FILM COATED ORAL EVERY EVENING
Status: DISCONTINUED | OUTPATIENT
Start: 2025-01-03 | End: 2025-01-04

## 2025-01-03 RX ORDER — ONDANSETRON 4 MG/1
4 TABLET, ORALLY DISINTEGRATING ORAL EVERY 4 HOURS PRN
Status: DISCONTINUED | OUTPATIENT
Start: 2025-01-03 | End: 2025-01-09 | Stop reason: HOSPADM

## 2025-01-03 RX ORDER — ASPIRIN 81 MG/1
81 TABLET, CHEWABLE ORAL DAILY
Status: DISCONTINUED | OUTPATIENT
Start: 2025-01-03 | End: 2025-01-09 | Stop reason: HOSPADM

## 2025-01-03 RX ORDER — ENOXAPARIN SODIUM 100 MG/ML
40 INJECTION SUBCUTANEOUS DAILY
Status: DISCONTINUED | OUTPATIENT
Start: 2025-01-03 | End: 2025-01-09 | Stop reason: HOSPADM

## 2025-01-03 RX ORDER — SODIUM CHLORIDE 9 MG/ML
500 INJECTION, SOLUTION INTRAVENOUS
Status: ACTIVE | OUTPATIENT
Start: 2025-01-03 | End: 2025-01-06

## 2025-01-03 RX ORDER — LISINOPRIL 10 MG/1
10 TABLET ORAL DAILY
Status: DISCONTINUED | OUTPATIENT
Start: 2025-01-04 | End: 2025-01-09 | Stop reason: HOSPADM

## 2025-01-03 RX ORDER — HYDRALAZINE HYDROCHLORIDE 20 MG/ML
10 INJECTION INTRAMUSCULAR; INTRAVENOUS
Status: DISCONTINUED | OUTPATIENT
Start: 2025-01-03 | End: 2025-01-09 | Stop reason: HOSPADM

## 2025-01-03 RX ORDER — ACETAMINOPHEN 325 MG/1
650 TABLET ORAL EVERY 6 HOURS PRN
Status: DISCONTINUED | OUTPATIENT
Start: 2025-01-03 | End: 2025-01-09 | Stop reason: HOSPADM

## 2025-01-03 RX ORDER — DIAZEPAM 5 MG/1
5 TABLET ORAL
Status: COMPLETED | OUTPATIENT
Start: 2025-01-03 | End: 2025-01-03

## 2025-01-03 RX ORDER — COLESTIPOL HYDROCHLORIDE 1 G/1
1 TABLET ORAL NIGHTLY
Status: DISCONTINUED | OUTPATIENT
Start: 2025-01-03 | End: 2025-01-09 | Stop reason: HOSPADM

## 2025-01-03 RX ORDER — LEVOTHYROXINE SODIUM 50 UG/1
50 TABLET ORAL
Status: DISCONTINUED | OUTPATIENT
Start: 2025-01-04 | End: 2025-01-09 | Stop reason: HOSPADM

## 2025-01-03 RX ORDER — IBUPROFEN 200 MG
500 CAPSULE ORAL DAILY
Status: DISCONTINUED | OUTPATIENT
Start: 2025-01-03 | End: 2025-01-03

## 2025-01-03 RX ORDER — LABETALOL HYDROCHLORIDE 5 MG/ML
10 INJECTION, SOLUTION INTRAVENOUS
Status: DISCONTINUED | OUTPATIENT
Start: 2025-01-03 | End: 2025-01-09 | Stop reason: HOSPADM

## 2025-01-03 RX ADMIN — ASPIRIN 81 MG: 81 TABLET, CHEWABLE ORAL at 18:34

## 2025-01-03 RX ADMIN — DIAZEPAM 5 MG: 5 TABLET ORAL at 17:40

## 2025-01-03 RX ADMIN — DEUTETRABENAZINE 2 TABLET: 12 TABLET, COATED ORAL at 21:00

## 2025-01-03 RX ADMIN — ATORVASTATIN CALCIUM 40 MG: 40 TABLET, FILM COATED ORAL at 18:34

## 2025-01-03 RX ADMIN — IOHEXOL 40 ML: 350 INJECTION, SOLUTION INTRAVENOUS at 11:25

## 2025-01-03 RX ADMIN — MIRTAZAPINE 60 MG: 15 TABLET, FILM COATED ORAL at 18:34

## 2025-01-03 RX ADMIN — IOHEXOL 80 ML: 350 INJECTION, SOLUTION INTRAVENOUS at 11:26

## 2025-01-03 RX ADMIN — COLESTIPOL HYDROCHLORIDE 1 G: 1 TABLET, FILM COATED ORAL at 21:00

## 2025-01-03 RX ADMIN — ENOXAPARIN SODIUM 40 MG: 100 INJECTION SUBCUTANEOUS at 18:34

## 2025-01-03 ASSESSMENT — ENCOUNTER SYMPTOMS
FOCAL WEAKNESS: 1
BLURRED VISION: 0
CHILLS: 0
TINGLING: 1
SENSORY CHANGE: 1
ABDOMINAL PAIN: 0
VOMITING: 0
DEPRESSION: 0
COUGH: 0
FEVER: 0
HEADACHES: 0
WEAKNESS: 1
MYALGIAS: 0
SHORTNESS OF BREATH: 0
DIZZINESS: 0
NAUSEA: 0

## 2025-01-03 ASSESSMENT — FIBROSIS 4 INDEX
FIB4 SCORE: 2.27
FIB4 SCORE: 2.05

## 2025-01-03 ASSESSMENT — PAIN DESCRIPTION - PAIN TYPE
TYPE: ACUTE PAIN
TYPE: ACUTE PAIN

## 2025-01-03 NOTE — DISCHARGE PLANNING
[facility] Transitional Care Coordination     Referral from: Dr Pugh  Insurance Provider on Facesheet: Medicare  Potential Rehab Diagnosis: R/o stroke     Chart review indicates patient may have on going medical management and may have therapy needs to possibly meet inpatient rehab facility criteria with the goal of returning to community.     D/C support: TBD     Physiatry consultation pended per protocol. Pending work up and therapy evals as appropriate. TCC will follow.     Thank you for the referral.

## 2025-01-03 NOTE — ED TRIAGE NOTES
Pt bib ib ems from home.  Chief Complaint   Patient presents with    Possible Stroke     Right leg weakness onset yesterday (1/2/25) in afternoon +difficulty walking     Pt was dc'd yesterday from the hospital after admit for low sodium.   Stroke assessment initiated. ERP and imaging complete.  Pt to B13. Report to NEW Meraz.

## 2025-01-03 NOTE — ED PROVIDER NOTES
ER Provider Note    Scribed for Dr. Geo Mathews M.D. by Carolina Mishra. 1/3/2025  11:12 AM    Primary Care Provider: QUANG Barber    CHIEF COMPLAINT  Chief Complaint   Patient presents with    Possible Stroke     Right leg weakness onset yesterday (1/2/25) in afternoon +difficulty walking       EXTERNAL RECORDS REVIEWED  Inpatient Notes The patient was hospitalized for hyponatremia 12/30/24.       HPI/ROS    OUTSIDE HISTORIAN(S):  EMS Provides history of encounter    Cinda Perrin is a 69 y.o. female with a history of [place]'s disease who presents to the ED for possible stroke onset yesterday. The patient was seen here one week ago for a fall and was hospitalized for hyponatremia. EMS reports that the patient has been experiencing right leg numbness and weakness since yesterday afternoon. They state that upon initial evaluation the patient was hypertensive which is consistent with history. She had a blood sugar of 128. The patient describes her weakness today as different than her weakness with her Hun[place]'s disease at baseline. She confirms taking her blood pressure medications this morning. The patient is DNR. She is allergic to Codeine and oxycodone.     PAST MEDICAL HISTORY  [place]'s disease and hypertension    SURGICAL HISTORY  No past surgical history noted.    FAMILY HISTORY  No family history pertinent.    SOCIAL HISTORY   reports that she has never smoked. She has never used smokeless tobacco. She reports current alcohol use. She reports that she does not use drugs.    CURRENT MEDICATIONS  Current Outpatient Medications   Medication Instructions    calcium carbonate (OS-VANCE 500) 500 mg, Oral, DAILY    cholecalciferol (VITAMIN D3) 5,000 Units, Oral, DAILY    colestipol (COLESTID) 1 g, Oral, NIGHTLY    Deutetrabenazine (AUSTEDO) 12 MG Tab 2 Tablets, Oral, 2 TIMES DAILY    levothyroxine (SYNTHROID) 50 mcg, Oral, EACH MORNING ON EMPTY STOMACH    lisinopril (PRINIVIL) 10 mg, Oral,  "DAILY    mirtazapine (REMERON) 60 mg, Oral, AFTER DINNER    pantoprazole (PROTONIX) 20 mg, Oral, DAILY, Before breakfast    potassium chloride ER (KLOR-CON) 10 MEQ tablet 10 mEq, Oral, DAILY    sodium chloride (SALT) 1 g, Oral, 2 TIMES DAILY WITH MEALS    traZODone (DESYREL) 50 mg, Oral, NIGHTLY PRN        ALLERGIES  Codeine and Oxycodone    PHYSICAL EXAM  /88   Pulse 89   Temp 36.9 °C (98.4 °F) (Temporal)   Resp 17   Ht 1.676 m (5' 6\")   Wt 64.9 kg (143 lb)   SpO2 96%   BMI 23.08 kg/m²   Constitutional: Alert in no apparent distress.  HENT: No signs of trauma, Bilateral external ears normal, Nose normal.   Eyes: Disconjugate gaze,  Pupils are equal and reactive, Conjunctiva normal, Non-icteric.   Neck: Normal range of motion, No tenderness, Supple,   Cardiovascular: Regular rate and rhythm, no murmurs.   Thorax & Lungs: Normal breath sounds, No respiratory distress, No wheezing, No chest tenderness.   Abdomen: Bowel sounds normal, Soft, No tenderness,   Skin: Warm, Dry, No erythema, No rash.   Back: No bony tenderness, No CVA tenderness.   Extremities: No edema, No tenderness, No cyanosis, equal pulses  Neurologic: Alert and oriented. Weakness in bilateral lower extremities, unable to dorsiflex on right lower extremity, sensation is intact throughout.  Psychiatric: Affect normal     DIAGNOSTIC STUDIES & PROCEDURES    Labs:   Labs Reviewed   CBC WITH DIFFERENTIAL - Abnormal; Notable for the following components:       Result Value    Lymphocytes 16.50 (*)     Lymphs (Absolute) 0.89 (*)     All other components within normal limits   COMP METABOLIC PANEL - Abnormal; Notable for the following components:    Glucose 107 (*)     AST(SGOT) 81 (*)     ALT(SGPT) 56 (*)     Alkaline Phosphatase 199 (*)     All other components within normal limits   APTT - Abnormal; Notable for the following components:    APTT 24.5 (*)     All other components within normal limits   ESTIMATED GFR - Abnormal; Notable for the " following components:    GFR (CKD-EPI) 54 (*)     All other components within normal limits   PROTHROMBIN TIME   COD (ADULT)   TROPONIN   ABO RH CONFIRM      All labs reviewed by me.    EKG Interpretation:  Interpreted by me  Sinus with a rate of 89 no ST elevation or T-wave inversion    Radiology:   The attending Emergency Physician has independently interpreted the diagnostic imaging associated with this visit and is awaiting the final reading from the radiologist, which will be displayed below.    Preliminary interpretation is a follows: No intracranial bleed  Radiologist interpretation:      DX-CHEST-PORTABLE (1 VIEW)   Final Result      No acute cardiac or pulmonary abnormalities are identified.      CT-CTA NECK WITH & W/O-POST PROCESSING   Final Result      No occlusion or hemodynamically significant stenosis of the neck arteries.      CT-CTA HEAD WITH & W/O-POST PROCESS   Final Result      No large vessel occlusion, hemodynamically significant stenosis or aneurysm.      CT-CEREBRAL PERFUSION ANALYSIS   Final Result      1. Cerebral blood flow less than 30% possibly representing completed infarct = 0 mL. Based on distribution of this finding, this is unlikely to represent artifact.      2. T Max more than 6 seconds possibly representing combination of completed infarct and ischemia = 0 mL. Based on the distribution of this finding, this is unlikely to represent artifact.      3. Mismatched volume possibly representing ischemic brain/penumbra= 0 mL      4.  Please note that this cerebral perfusion study and report is Quantitative and targets supratentorial (cerebral) perfusion for evaluation of large vessel territory acute ischemia/infarction. For example, lacunar infarcts, and brainstem/posterior fossa    ischemia/infarction are not evaluated on this study.  Data acquisition is subject to artifacts which can yield non-anatomically plausible perfusion maps which may be due to motion, bolus timing, signal to  "noise ratio, or other technical factors.    Perfusion map abnormalities which show non-anatomic distributions are likely artifact.   This study is not \"stand-alone\" and should only be utilized for diagnosis, management/treatment in correlation with CT, CTA, and/or MRI and clinical factors.         CT-HEAD W/O   Final Result      1. No acute intracranial hemorrhage.   2. Atrophy and diffuse chronic microangiopathic white matter changes versus demyelination gliosis.   3. Hypodensity in the left frontal lobe periventricular white matter measuring 11 mm in diameter. It could represent edema, demyelination or encephalomalacia.                    COURSE & MEDICAL DECISION MAKING    ED Observation Status? No; Patient does not meet criteria for ED Observation.     INITIAL ASSESSMENT AND PLAN  Care Narrative:       11:12 AM - Patient seen and evaluated at the charge desk as a code stroke. Discussed plan of care, including labs and imaging. Patient agrees to plan of care. Ordered DX-Chest, CT-Head w/o, CT- Cerebral perfusion, CTA Head and Neck, CBC with diff, CMP, Prothrombin, APTT, COD, Troponin, and EKG to evaluate.     12:22 PM - Patient was reevaluated at bedside. Discussed lab and radiology results with the patient. The patient had the opportunity to ask any questions. The plan for hospitalization was discussed with the patient given their current presentation and diagnostic study results. The patient is understanding and agreeable to the plan for hospitalization.      12:26 PM - I discussed the patient's case and the above findings with Dr. Pugh (Hospitalist) who agrees to evaluate the patient for hospitalization.     ADDITIONAL PROBLEM LIST AND DISPOSITION   Patient with weakness to right lower extremity.  There does appear to be a foot drop.  No large vessel occlusion.  Over 12 hours from initial findings.  Therefore thrombolytics are not indicated.  At this time the patient may have stroke and does have weakness.  " Would likely need physical therapy.  Will need an MRI to evaluate for acute stroke.  Admit to the hospitalist.  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               DISPOSITION AND DISCUSSIONS  I have discussed management of the patient with the following physicians and GAIL's: Dr. Pugh (Hospitalist)     Discussion of management with other [facility] or appropriate source(s): None     Barriers to care at this time, including but not limited to:  None .     Decision tools and prescription drugs considered including, but not limited to:  none .    DISPOSITION:  Patient will be hospitalized by Dr. Pugh in guarded condition.     FINAL IMPRESSION   1. Weakness         Carolina BOOKER (Scribe), am scribing for, and in the presence of, Geo Mathews M.D..    Electronically signed by: Carolina Mishra ([place]), 1/3/2025    Geo BOOKER M.D. personally performed the services described in this documentation, as scribed by Carolina Mishra in my presence, and it is both accurate and complete.    The note accurately reflects work and decisions made by me.  Geo Mathews M.D.  1/3/2025  2:03 PM

## 2025-01-03 NOTE — ASSESSMENT & PLAN NOTE
Acute right lower extremity weakness with foot drop and numbness/tingling, no further numbness or tingling  No evidence of stroke on MRI  I discussed with neurology who recommends outpatient emg  CTA workup in ER negative for large vessel occlusion  Symptoms started yesterday, outside window for tPA  PT, OT  Post acute placement pending    Discussed with neurologist, at this time recommending to continue supportive treatment, outpatient f/u with neuro for EMG.   MRI c spine, knee and ankle reviewed, discussed with ortho Dr Chao f/u as outpatient recommended.

## 2025-01-03 NOTE — H&P
Hospital Medicine History & Physical Note    Date of Service  1/3/2025    Primary Care Physician  QUANG Barber    Consultants  none    Code Status  DNAR/DNI    Chief Complaint  Chief Complaint   Patient presents with    Possible Stroke     Right leg weakness onset yesterday (1/2/25) in afternoon +difficulty walking       History of Presenting Illness  Cinda Perrin is a 69 y.o. female with past medical history of [place]'s disease, hypothyroidism who presented 1/3/2025 with acute onset right lower extremity weakness and numbness.  Of note patient was hospitalized 12/30 and discharged yesterday for weakness and falls found to have hyponatremia which had resolved at time of discharge.  Patient reports she went home and suddenly started having right lower extremity weakness with numbness.  Also reporting foot drop and difficulty ambulating with her walker as she felt like her foot was dragging across the floor.  She denied any right upper extremity weakness or numbness, dysarthria, or dysphagia.  Patient reports her weakness did not resolve today and thus came back to the ER for evaluation.  She denied any falls post discharge, new onset back pain, fevers, chills, chest pain or shortness of breath.    The ER vital signs significant for blood pressure 174/84.  Labs significant for creatinine 1.1, GFR 54, AST 81, ALT 56, alkaline phosphatase 199.  Troponin negative.  CT head workup in ER within normal limits.  Chest x-ray reviewed showed no acute findings.    I discussed the plan of care with patient and ERP .    Review of Systems  Review of Systems   Constitutional:  Positive for malaise/fatigue. Negative for chills and fever.   HENT:  Negative for congestion.    Eyes:  Negative for blurred vision.   Respiratory:  Negative for cough and shortness of breath.    Cardiovascular:  Negative for chest pain and leg swelling.   Gastrointestinal:  Negative for abdominal pain, nausea and vomiting.    Genitourinary:  Negative for dysuria.   Musculoskeletal:  Negative for myalgias.   Skin:  Negative for rash.   Neurological:  Positive for tingling, sensory change, focal weakness and weakness. Negative for dizziness and headaches.   Psychiatric/Behavioral:  Negative for depression.    All other systems reviewed and are negative.      Past Medical History   has a past medical history of [place]'s disease (HCC) and Hypothyroidism.    Surgical History   has no past surgical history on file.     Family History  Family history reviewed with patient. There is no family history that is pertinent to the chief complaint.     Social History   reports that she has never smoked. She has never used smokeless tobacco. She reports current alcohol use. She reports that she does not use drugs.    Allergies  Allergies   Allergen Reactions    Codeine Unspecified     Intake    Oxycodone Rash     Rash        Medications  Prior to Admission Medications   Prescriptions Last Dose Informant Patient Reported? Taking?   Deutetrabenazine (AUSTEDO) 12 MG Tab  Patient Yes No   Sig: Take 2 Tablets by mouth 2 times a day. Indications: [place]'s Chorea   calcium carbonate (OS-VANCE 500) 1250 (500 Ca) MG Tab  Patient Yes No   Sig: Take 500 mg by mouth every day. Indications: Low Amount of Calcium in the Blood   cholecalciferol (VITAMIN D3) 5000 UNIT Cap  Patient Yes No   Sig: Take 5,000 Units by mouth every day.   colestipol (COLESTID) 1 GM Tab  Patient No No   Sig: Take 1 Tablet by mouth every evening.   levothyroxine (SYNTHROID) 50 MCG Tab  Patient No No   Sig: Take 1 Tablet by mouth every morning on an empty stomach. Indications: Underactive Thyroid   lisinopril (PRINIVIL) 10 MG Tab  Patient No No   Sig: Take 1 Tablet by mouth every day. Indications: High Blood Pressure   mirtazapine (REMERON) 30 MG Tab tablet  Patient No No   Sig: Take 2 Tablets by mouth 1/2 hour after dinner.   pantoprazole (PROTONIX) 20 MG tablet  Patient No No   Sig:  Take 1 Tablet by mouth every day. Before breakfast   potassium chloride ER (KLOR-CON) 10 MEQ tablet  Patient No No   Sig: Take 1 Tablet by mouth every day.   sodium chloride (SALT) 1 GM Tab   No No   Sig: Take 1 Tablet by mouth 2 times a day with meals for 30 days.   traZODone (DESYREL) 50 MG Tab  Patient No No   Sig: Take 1 Tablet by mouth at bedtime as needed for Sleep or Depression. Indications: Trouble Sleeping      Facility-Administered Medications: None       Physical Exam  Temp:  [36.9 °C (98.4 °F)] 36.9 °C (98.4 °F)  Pulse:  [82-89] 82  Resp:  [17-19] 19  BP: (134-174)/(84-88) 174/84  SpO2:  [94 %-96 %] 96 %  Blood Pressure : (!) 174/84   Temperature: 36.9 °C (98.4 °F)   Pulse: 82   Respiration: 19   Pulse Oximetry: 96 %       Physical Exam  Vitals and nursing note reviewed.   Constitutional:       General: She is not in acute distress.     Appearance: She is ill-appearing (chronically).   HENT:      Head: Normocephalic and atraumatic.      Right Ear: External ear normal.      Left Ear: External ear normal.      Nose: Nose normal.      Mouth/Throat:      Pharynx: Oropharynx is clear.   Eyes:      Extraocular Movements: Extraocular movements intact.      Conjunctiva/sclera: Conjunctivae normal.      Pupils: Pupils are equal, round, and reactive to light.   Cardiovascular:      Rate and Rhythm: Normal rate and regular rhythm.      Pulses: Normal pulses.      Heart sounds: Normal heart sounds.   Pulmonary:      Effort: Pulmonary effort is normal. No respiratory distress.      Breath sounds: No wheezing.   Abdominal:      General: Abdomen is flat. There is no distension.      Palpations: Abdomen is soft.      Tenderness: There is no abdominal tenderness.   Musculoskeletal:         General: Normal range of motion.      Cervical back: Neck supple.      Right lower leg: No edema.      Left lower leg: No edema.   Skin:     General: Skin is warm and dry.   Neurological:      Mental Status: She is alert and oriented  to person, place, and time.      Sensory: No sensory deficit.      Motor: Weakness (RLE at hip 4/5, unable to dorsiflex/plantarflex) present.   Psychiatric:         Mood and Affect: Affect is flat.         Behavior: Behavior normal.         Thought Content: Thought content normal.         Laboratory:  Recent Labs     01/01/25  0137 01/02/25  0419 01/03/25  1120   WBC 7.2 5.0 5.4   RBC 3.87* 4.05* 4.61   HEMOGLOBIN 12.5 12.8 14.8   HEMATOCRIT 35.6* 37.2 42.7   MCV 92.0 91.9 92.6   MCH 32.3 31.6 32.1   MCHC 35.1 34.4 34.7   RDW 43.7 43.9 47.9   PLATELETCT 241 276 365   MPV 9.6 9.6 9.3     Recent Labs     01/01/25  0137 01/01/25  0845 01/02/25  0419 01/02/25  1044 01/03/25  1120   SODIUM 120*   < > 133* 135 136   POTASSIUM 3.8  --  4.4  --  4.6   CHLORIDE 89*  --  100  --  98   CO2 21  --  24  --  26   GLUCOSE 101*  --  91  --  107*   BUN 8  --  8  --  8   CREATININE 1.13  --  0.91  --  1.10   CALCIUM 9.0  --  8.6  --  10.1    < > = values in this interval not displayed.     Recent Labs     01/01/25 0137 01/02/25  0419 01/03/25  1120   ALTSGPT  --   --  56*   ASTSGOT  --   --  81*   ALKPHOSPHAT  --   --  199*   TBILIRUBIN  --   --  0.6   GLUCOSE 101* 91 107*     Recent Labs     01/03/25  1120   APTT 24.5*   INR 0.96     Recent Labs     01/01/25  0845   NTPROBNP 276*         Recent Labs     01/03/25  1120   TROPONINT 7       Imaging:  DX-CHEST-PORTABLE (1 VIEW)   Final Result      No acute cardiac or pulmonary abnormalities are identified.      CT-CTA NECK WITH & W/O-POST PROCESSING   Final Result      No occlusion or hemodynamically significant stenosis of the neck arteries.      CT-CTA HEAD WITH & W/O-POST PROCESS   Final Result      No large vessel occlusion, hemodynamically significant stenosis or aneurysm.      CT-CEREBRAL PERFUSION ANALYSIS   Final Result      1. Cerebral blood flow less than 30% possibly representing completed infarct = 0 mL. Based on distribution of this finding, this is unlikely to represent  "artifact.      2. T Max more than 6 seconds possibly representing combination of completed infarct and ischemia = 0 mL. Based on the distribution of this finding, this is unlikely to represent artifact.      3. Mismatched volume possibly representing ischemic brain/penumbra= 0 mL      4.  Please note that this cerebral perfusion study and report is Quantitative and targets supratentorial (cerebral) perfusion for evaluation of large vessel territory acute ischemia/infarction. For example, lacunar infarcts, and brainstem/posterior fossa    ischemia/infarction are not evaluated on this study.  Data acquisition is subject to artifacts which can yield non-anatomically plausible perfusion maps which may be due to motion, bolus timing, signal to noise ratio, or other technical factors.    Perfusion map abnormalities which show non-anatomic distributions are likely artifact.   This study is not \"stand-alone\" and should only be utilized for diagnosis, management/treatment in correlation with CT, CTA, and/or MRI and clinical factors.         CT-HEAD W/O   Final Result      1. No acute intracranial hemorrhage.   2. Atrophy and diffuse chronic microangiopathic white matter changes versus demyelination gliosis.   3. Hypodensity in the left frontal lobe periventricular white matter measuring 11 mm in diameter. It could represent edema, demyelination or encephalomalacia.               MR-BRAIN-W/O    (Results Pending)       X-Ray:  I have personally reviewed the images and compared with prior images.  EKG:  I have personally reviewed the images and compared with prior images.    Assessment/Plan:  Justification for Admission Status  I anticipate this patient will require at least two midnights for appropriate medical management, necessitating inpatient admission because patient with acute right lower extremity weakness, foot drop and numbness concerning for acute CVA.  CT workup negative for large vessel occlusion and send symptoms " started yesterday outside the window for tPA.  MRI brain pending.  Needs repeat PT/OT evaluation suspect will need placement on discharge.    * Acute weakness- (present on admission)  Assessment & Plan  Acute right lower extremity weakness with foot drop and numbness/tingling  CTA workup in ER negative for large vessel occlusion  Symptoms started yesterday, outside window for tPA  MRI brain pending  -Consider neurology consult/TTE based on result  Start aspirin, statin  Telemetry monitoring  PT/OT/SLP    Transaminitis- (present on admission)  Assessment & Plan  Appears chronic  Started atorvastatin for possible CVA, Daily CMP    GERD without esophagitis- (present on admission)  Assessment & Plan  Continue PPI    Benign hypertension with CKD (chronic kidney disease) stage III- (present on admission)  Assessment & Plan  Continue lisinopril  As needed antihypertensives, no need for permissive hypertension    Insomnia due to medical condition- (present on admission)  Assessment & Plan  Continue Remeron    Acquired hypothyroidism- (present on admission)  Assessment & Plan  Continue home levothyroxine    Mixed dyslipidemia- (present on admission)  Assessment & Plan  Continue colestipol  Patient with mild AST/ALT elevation, monitor while on statin    [place]'s disease (HCC)- (present on admission)  Assessment & Plan  Continue home medications deutetrabenazine        VTE prophylaxis: SCDs/TEDs and enoxaparin ppx

## 2025-01-04 PROBLEM — R13.10 DYSPHAGIA: Status: ACTIVE | Noted: 2025-01-04

## 2025-01-04 LAB
ANION GAP SERPL CALC-SCNC: 11 MMOL/L (ref 7–16)
BUN SERPL-MCNC: 9 MG/DL (ref 8–22)
CALCIUM SERPL-MCNC: 9.6 MG/DL (ref 8.5–10.5)
CHLORIDE SERPL-SCNC: 96 MMOL/L (ref 96–112)
CHOLEST SERPL-MCNC: 153 MG/DL (ref 100–199)
CO2 SERPL-SCNC: 26 MMOL/L (ref 20–33)
CREAT SERPL-MCNC: 0.99 MG/DL (ref 0.5–1.4)
ERYTHROCYTE [DISTWIDTH] IN BLOOD BY AUTOMATED COUNT: 48.1 FL (ref 35.9–50)
GFR SERPLBLD CREATININE-BSD FMLA CKD-EPI: 62 ML/MIN/1.73 M 2
GLUCOSE SERPL-MCNC: 90 MG/DL (ref 65–99)
HCT VFR BLD AUTO: 39.4 % (ref 37–47)
HDLC SERPL-MCNC: 57 MG/DL
HGB BLD-MCNC: 13.6 G/DL (ref 12–16)
LDLC SERPL CALC-MCNC: 68 MG/DL
MCH RBC QN AUTO: 31.6 PG (ref 27–33)
MCHC RBC AUTO-ENTMCNC: 34.5 G/DL (ref 32.2–35.5)
MCV RBC AUTO: 91.6 FL (ref 81.4–97.8)
PLATELET # BLD AUTO: 356 K/UL (ref 164–446)
PMV BLD AUTO: 9.7 FL (ref 9–12.9)
POTASSIUM SERPL-SCNC: 3.9 MMOL/L (ref 3.6–5.5)
RBC # BLD AUTO: 4.3 M/UL (ref 4.2–5.4)
SODIUM SERPL-SCNC: 133 MMOL/L (ref 135–145)
TRIGL SERPL-MCNC: 141 MG/DL (ref 0–149)
WBC # BLD AUTO: 7.5 K/UL (ref 4.8–10.8)

## 2025-01-04 PROCEDURE — 85027 COMPLETE CBC AUTOMATED: CPT

## 2025-01-04 PROCEDURE — 770020 HCHG ROOM/CARE - TELE (206)

## 2025-01-04 PROCEDURE — 99233 SBSQ HOSP IP/OBS HIGH 50: CPT | Performed by: HOSPITALIST

## 2025-01-04 PROCEDURE — 36415 COLL VENOUS BLD VENIPUNCTURE: CPT

## 2025-01-04 PROCEDURE — 92610 EVALUATE SWALLOWING FUNCTION: CPT

## 2025-01-04 PROCEDURE — 700102 HCHG RX REV CODE 250 W/ 637 OVERRIDE(OP): Performed by: INTERNAL MEDICINE

## 2025-01-04 PROCEDURE — 700111 HCHG RX REV CODE 636 W/ 250 OVERRIDE (IP): Mod: JZ | Performed by: INTERNAL MEDICINE

## 2025-01-04 PROCEDURE — 700102 HCHG RX REV CODE 250 W/ 637 OVERRIDE(OP): Performed by: HOSPITALIST

## 2025-01-04 PROCEDURE — 80048 BASIC METABOLIC PNL TOTAL CA: CPT

## 2025-01-04 PROCEDURE — 97166 OT EVAL MOD COMPLEX 45 MIN: CPT

## 2025-01-04 PROCEDURE — 80061 LIPID PANEL: CPT

## 2025-01-04 PROCEDURE — A9270 NON-COVERED ITEM OR SERVICE: HCPCS | Performed by: HOSPITALIST

## 2025-01-04 PROCEDURE — 99255 IP/OBS CONSLTJ NEW/EST HI 80: CPT | Performed by: PSYCHIATRY & NEUROLOGY

## 2025-01-04 PROCEDURE — A9270 NON-COVERED ITEM OR SERVICE: HCPCS | Performed by: INTERNAL MEDICINE

## 2025-01-04 RX ORDER — POLYETHYLENE GLYCOL 3350 17 G/17G
1 POWDER, FOR SOLUTION ORAL
Status: DISCONTINUED | OUTPATIENT
Start: 2025-01-04 | End: 2025-01-09 | Stop reason: HOSPADM

## 2025-01-04 RX ORDER — AMOXICILLIN 250 MG
1 CAPSULE ORAL DAILY
Status: DISCONTINUED | OUTPATIENT
Start: 2025-01-04 | End: 2025-01-09 | Stop reason: HOSPADM

## 2025-01-04 RX ORDER — DIAZEPAM 5 MG/1
5 TABLET ORAL
Status: COMPLETED | OUTPATIENT
Start: 2025-01-04 | End: 2025-01-05

## 2025-01-04 RX ORDER — DIAZEPAM 5 MG/1
5 TABLET ORAL ONCE
Status: DISCONTINUED | OUTPATIENT
Start: 2025-01-04 | End: 2025-01-04

## 2025-01-04 RX ADMIN — LISINOPRIL 10 MG: 10 TABLET ORAL at 06:17

## 2025-01-04 RX ADMIN — POTASSIUM CHLORIDE 10 MEQ: 750 TABLET, EXTENDED RELEASE ORAL at 06:16

## 2025-01-04 RX ADMIN — MIRTAZAPINE 60 MG: 15 TABLET, FILM COATED ORAL at 17:21

## 2025-01-04 RX ADMIN — DEUTETRABENAZINE 2 TABLET: 12 TABLET, COATED ORAL at 20:44

## 2025-01-04 RX ADMIN — ENOXAPARIN SODIUM 40 MG: 100 INJECTION SUBCUTANEOUS at 17:21

## 2025-01-04 RX ADMIN — COLESTIPOL HYDROCHLORIDE 1 G: 1 TABLET, FILM COATED ORAL at 20:44

## 2025-01-04 RX ADMIN — LEVOTHYROXINE SODIUM 50 MCG: 50 TABLET ORAL at 06:17

## 2025-01-04 RX ADMIN — ASPIRIN 81 MG: 81 TABLET, CHEWABLE ORAL at 06:17

## 2025-01-04 RX ADMIN — SENNOSIDES AND DOCUSATE SODIUM 1 TABLET: 50; 8.6 TABLET ORAL at 18:36

## 2025-01-04 RX ADMIN — OMEPRAZOLE 20 MG: 20 CAPSULE, DELAYED RELEASE ORAL at 06:17

## 2025-01-04 RX ADMIN — DEUTETRABENAZINE 2 TABLET: 12 TABLET, COATED ORAL at 09:21

## 2025-01-04 ASSESSMENT — ENCOUNTER SYMPTOMS
MYALGIAS: 0
COUGH: 0
CARDIOVASCULAR NEGATIVE: 1
HEADACHES: 0
PSYCHIATRIC NEGATIVE: 1
BLURRED VISION: 0
WEAKNESS: 1
GASTROINTESTINAL NEGATIVE: 1
FEVER: 0
FOCAL WEAKNESS: 0
WEIGHT LOSS: 0
SORE THROAT: 0
RESPIRATORY NEGATIVE: 1
SHORTNESS OF BREATH: 0
CHILLS: 0
DIAPHORESIS: 0
EYES NEGATIVE: 1
ABDOMINAL PAIN: 0
NAUSEA: 0
VOMITING: 0
CONSTITUTIONAL NEGATIVE: 1
BRUISES/BLEEDS EASILY: 0
PALPITATIONS: 0
DEPRESSION: 0
MUSCULOSKELETAL NEGATIVE: 1
DIZZINESS: 0

## 2025-01-04 ASSESSMENT — COGNITIVE AND FUNCTIONAL STATUS - GENERAL
SUGGESTED CMS G CODE MODIFIER DAILY ACTIVITY: CJ
HELP NEEDED FOR BATHING: A LITTLE
TOILETING: A LOT
DRESSING REGULAR LOWER BODY CLOTHING: A LITTLE
DAILY ACTIVITIY SCORE: 20

## 2025-01-04 ASSESSMENT — PAIN DESCRIPTION - PAIN TYPE
TYPE: ACUTE PAIN

## 2025-01-04 ASSESSMENT — ACTIVITIES OF DAILY LIVING (ADL): TOILETING: INDEPENDENT

## 2025-01-04 ASSESSMENT — LIFESTYLE VARIABLES: SUBSTANCE_ABUSE: 0

## 2025-01-04 ASSESSMENT — FIBROSIS 4 INDEX: FIB4 SCORE: 2.1

## 2025-01-04 NOTE — CARE PLAN
The patient is Stable - Low risk of patient condition declining or worsening    Shift Goals  Clinical Goals: Stable neuro status, maintain skin integrity, safety  Patient Goals: rest  Family Goals: tamika    Progress made toward(s) clinical / shift goals:      Problem: Neuro Status  Goal: Neuro status will remain stable or improve  Outcome: Progressing     Problem: Optimal Care of the Stroke Patient  Goal: Optimal acute care for the stroke patient  Outcome: Progressing     Problem: Urinary Elimination  Goal: Establish and maintain regular urinary output  Outcome: Progressing     Problem: Hemodynamic Monitoring  Goal: Patient's hemodynamics, fluid balance and neurologic status will be stable or improve  Outcome: Progressing     Problem: Fall Risk  Goal: Patient will remain free from falls  Outcome: Progressing     Problem: Skin Integrity  Goal: Skin integrity is maintained or improved  Outcome: Progressing       Patient is not progressing towards the following goals:

## 2025-01-04 NOTE — CONSULTS
"Neurology Initial Consult H&P  Neurohospitalist Service, [facility]    Referring Physician: Felipa Baker M.D.    Chief Complaint   Patient presents with    Possible Stroke     Right leg weakness onset yesterday (1/2/25) in afternoon +difficulty walking       HPI: Cinda Perrin is a 69 y.o. right-handed woman with [place] disease (HD) presenting with acute right leg weakness for whom neurology has been consulted for evaluation.     Background history- Diagnosed with HD at [facility] in 2022. Follows with the [facility] [place]. Last visit was July 2024. HD medications at that time were Effexor 37.5mg daily, Austedo 24mg twice daily and Remeron 60mg nightly. Tapered off Amantadine. At baseline uses a rolling walker. Has experienced falls; last was one week resulting in bruising of the right side (face/head, right hip and right foot).     2 days ago seemed to notice that her \"right foot is flopping\" when attempting to ambulate. Unclear onset. No apparent trigger or precipitant. Denies any other or new areas of weakness. No sensory changes. No neck or back pain. Has right hip pain from a fall 1 week ago. Constipated; otherwise no bowel or bladder dysfunction. No saddle anesthesia. Weakness has not progressed nor improvement since onset. She reports that her right foot was of normal strength prior to this event.     Review of systems: In addition to what is detailed in the HPI above, all other systems reviewed and are negative.    Past Medical History:    has a past medical history of [place]'s disease (HCC) and Hypothyroidism.    FHx:  family history is not on file.    SHx:   reports that she has never smoked. She has never used smokeless tobacco. She reports current alcohol use. She reports that she does not use drugs.    Allergies:  Allergies   Allergen Reactions    Codeine Unspecified     Intake    Oxycodone Rash     Rash        Medications:    Current " Facility-Administered Medications:     enoxaparin (Lovenox) inj 40 mg, 40 mg, Subcutaneous, DAILY AT 1800, SANAM FlorezORashid, 40 mg at 01/03/25 1834    acetaminophen (Tylenol) tablet 650 mg, 650 mg, Oral, Q6HRS PRN, Keysha Pugh D.O.    ondansetron (Zofran) syringe/vial injection 4 mg, 4 mg, Intravenous, Q4HRS PRN, SANAM FlorezORashid    ondansetron (Zofran ODT) dispertab 4 mg, 4 mg, Oral, Q4HRS PRN, Keysha Pugh D.O.    aspirin (Asa) chewable tab 81 mg, 81 mg, Oral, DAILY, 81 mg at 01/04/25 0617 **OR** [DISCONTINUED] aspirin (Asa) suppository 300 mg, 300 mg, Rectal, DAILY, Keysha Pugh D.O.    Blood pressure control per admin instructions, , Other, PHARMACY TO DOSE, Keysha Pugh D.O.    labetalol (Normodyne/Trandate) injection 10 mg, 10 mg, Intravenous, Q10 MIN PRN, Keysha Pugh D.O.    hydrALAZINE (Apresoline) injection 10 mg, 10 mg, Intravenous, Q2HRS PRN, Keysha Pugh D.O.    NS (Bolus) 0.9 % infusion 500 mL, 500 mL, Intravenous, Once PRN, Keysha Pugh D.O.    colestipol (Colestid) tablet 1 g, 1 g, Oral, Nightly, SANAM FlorezO., 1 g at 01/03/25 2100    levothyroxine (Synthroid) tablet 50 mcg, 50 mcg, Oral, AM ES, SANAM FlorezO., 50 mcg at 01/04/25 0617    lisinopril (Prinivil) tablet 10 mg, 10 mg, Oral, DAILY, SANAM FlorezORashid, 10 mg at 01/04/25 0617    mirtazapine (Remeron) tablet 60 mg, 60 mg, Oral, AFTER DINNER, SANAM FlorezO., 60 mg at 01/03/25 1834    potassium chloride ER (Klor-Con) tablet 10 mEq, 10 mEq, Oral, DAILY, Keysha Pugh D.O., 10 mEq at 01/04/25 0616    omeprazole (PriLOSEC) capsule 20 mg, 20 mg, Oral, DAILY, Keysha Pugh D.O., 20 mg at 01/04/25 0617    Deutetrabenazine TABS 2 Tablet, 24 mg, Oral, BID, Keysha Pugh D.O., 2 Tablet at 01/04/25 0921    Physical Examination:     General: Patient is awake and in no acute distress  Eye: Examination of optic disks not indicated at this time given acuity of consult  Neck: There is  "normal range of motion  CV: regular rate   Extremities:  ecchymosis right dorsum of the foot. No TTP throughout the right lower extremity. No obvious deformity or edema. Has right hip pain.     NEUROLOGICAL EXAM:     /89   Pulse 85   Temp 36.8 °C (98.2 °F) (Temporal)   Resp 17   Ht 1.676 m (5' 6\")   Wt 56.4 kg (124 lb 5.4 oz)   SpO2 95%   BMI 20.07 kg/m²     Mental status: Awake, alert and oriented.  Attention is intact.  Answers questions appropriately.  Language: No aphasia. Mild dysarthria.   Cranial nerves:    Pupils are equal, round and reactive to light  Interrupted pursuits with incomplete range  Face appears symmetric  Hearing is intact to conversation  Motor:   Motor impersistence.  Right foot drop (0/4 dorsiflexion; 4/5 plantarflexion).  4+ power in the bilateral upper extremities  4+ in the left lower extremity  4+ proximally in the right lower extremity  Minimal chorea  Moderate bradykinesia  No dystonia   Reflexes: Deep tendon reflexes 2 and symmetric in the bilateral upper and lower extremities. Bilateral plantar responses are flexor. No clonus.   Sensory: Intact to light-touch and temperature.  Coordination: No obvious dysmetria.  Gait: Not tested due to patient preference.     Objective Data:    Labs:  Lab Results   Component Value Date/Time    PROTHROMBTM 12.7 01/03/2025 11:20 AM    INR 0.96 01/03/2025 11:20 AM      Lab Results   Component Value Date/Time    WBC 7.5 01/04/2025 12:22 AM    RBC 4.30 01/04/2025 12:22 AM    HEMOGLOBIN 13.6 01/04/2025 12:22 AM    HEMATOCRIT 39.4 01/04/2025 12:22 AM    MCV 91.6 01/04/2025 12:22 AM    MCH 31.6 01/04/2025 12:22 AM    MCHC 34.5 01/04/2025 12:22 AM    MPV 9.7 01/04/2025 12:22 AM    NEUTSPOLYS 70.80 01/03/2025 11:20 AM    LYMPHOCYTES 16.50 (L) 01/03/2025 11:20 AM    MONOCYTES 10.20 01/03/2025 11:20 AM    EOSINOPHILS 1.50 01/03/2025 11:20 AM    BASOPHILS 0.60 01/03/2025 11:20 AM      Lab Results   Component Value Date/Time    SODIUM 133 (L) " 01/04/2025 02:05 AM    POTASSIUM 3.9 01/04/2025 02:05 AM    CHLORIDE 96 01/04/2025 02:05 AM    CO2 26 01/04/2025 02:05 AM    GLUCOSE 90 01/04/2025 02:05 AM    BUN 9 01/04/2025 02:05 AM    CREATININE 0.99 01/04/2025 02:05 AM      Lab Results   Component Value Date/Time    CHOLSTRLTOT 153 01/04/2025 02:05 AM    LDL 68 01/04/2025 02:05 AM    HDL 57 01/04/2025 02:05 AM    TRIGLYCERIDE 141 01/04/2025 02:05 AM       Lab Results   Component Value Date/Time    ALKPHOSPHAT 199 (H) 01/03/2025 11:20 AM    ASTSGOT 81 (H) 01/03/2025 11:20 AM    ALTSGPT 56 (H) 01/03/2025 11:20 AM    TBILIRUBIN 0.6 01/03/2025 11:20 AM        Imaging/Testing:    I interpreted and/or reviewed the patient's neuroimaging    MR-BRAIN-W/O   Final Result         1. Age-related cerebral atrophy.      2. Mild periventricular white matter changes consistent with chronic microvascular ischemic gliosis.      3. More prominent patchy focus of increased T2 signal intensity in the left anterior frontal periventricular white matter also consistent with chronic microvascular ischemic gliosis.      DX-CHEST-PORTABLE (1 VIEW)   Final Result      No acute cardiac or pulmonary abnormalities are identified.      CT-CTA NECK WITH & W/O-POST PROCESSING   Final Result      No occlusion or hemodynamically significant stenosis of the neck arteries.      CT-CTA HEAD WITH & W/O-POST PROCESS   Final Result      No large vessel occlusion, hemodynamically significant stenosis or aneurysm.      CT-CEREBRAL PERFUSION ANALYSIS   Final Result      1. Cerebral blood flow less than 30% possibly representing completed infarct = 0 mL. Based on distribution of this finding, this is unlikely to represent artifact.      2. T Max more than 6 seconds possibly representing combination of completed infarct and ischemia = 0 mL. Based on the distribution of this finding, this is unlikely to represent artifact.      3. Mismatched volume possibly representing ischemic brain/penumbra= 0 mL      4.   "Please note that this cerebral perfusion study and report is Quantitative and targets supratentorial (cerebral) perfusion for evaluation of large vessel territory acute ischemia/infarction. For example, lacunar infarcts, and brainstem/posterior fossa    ischemia/infarction are not evaluated on this study.  Data acquisition is subject to artifacts which can yield non-anatomically plausible perfusion maps which may be due to motion, bolus timing, signal to noise ratio, or other technical factors.    Perfusion map abnormalities which show non-anatomic distributions are likely artifact.   This study is not \"stand-alone\" and should only be utilized for diagnosis, management/treatment in correlation with CT, CTA, and/or MRI and clinical factors.         CT-HEAD W/O   Final Result      1. No acute intracranial hemorrhage.   2. Atrophy and diffuse chronic microangiopathic white matter changes versus demyelination gliosis.   3. Hypodensity in the left frontal lobe periventricular white matter measuring 11 mm in diameter. It could represent edema, demyelination or encephalomalacia.                   Impression and Recommendations: Cinda Perrin is a 69 y.o. right-handed woman with [place] disease (HD) presenting with acute right leg weakness for whom neurology has been consulted for evaluation.     [place] disease (HD).  New right foot drop. Appears to be isolated weakness of dorsiflexion without clear sensory deficits.   Recurrent falls. Last was 1 week ago injuring the right side (face, hip and foot).    Etiology is unclear; however trauma from recent fall is a consideration. No clearly associated sensory symptoms or findings. Does not appear to be dystonia or other disorder of movement. There were no acute intracranial findings identified on MRI brain.     -Unfortunately we are unable to obtain inpatient EMG/NCS at this time.  -MRI of the lumbar spine, right hip and right ankle with and without contrast when " able.  -PT/OT.   -AFO.  -Will need close outpatient neurology follow-up for an expedited EMG/NCS and clinical re-assessment.     Neurology will follow along.       Dewey Benton MD  Neurohospitalist, Acute Care Services      The evaluation of the patient, and recommended management, was discussed with Dr. Baker     I personally provided 80 minutes of total acute neurologic care time outside of time spent on separately billable/documented procedures. Time includes: review of laboratory data, review of radiology studies, discussion with consultants, discussion with family/patient, monitoring for potential decompensation.  Interventions were performed as documented in the chart.        Please note that this dictation was created using voice recognition software.  I have made every reasonable attempt to correct obvious errors, but I expect that there are errors of grammar and possibly content that I did not discover before finalizing the note.

## 2025-01-04 NOTE — ASSESSMENT & PLAN NOTE
Continue speech therapy  Follow up with GI as outpatient per speech recommendation  Tolerating diet.

## 2025-01-04 NOTE — CARE PLAN
The patient is Stable - Low risk of patient condition declining or worsening    Shift Goals  Clinical Goals: Stable neuro status, maintain skin integrity  Patient Goals: Rest  Family Goals: TRANG    Progress made toward(s) clinical / shift goals:    Problem: Psychosocial - Patient Condition  Goal: Patient's ability to verbalize feelings about condition will improve  Description: Target End Date:  Prior to discharge or change in level of care    1.  Discuss coping with medical condition and its effects  2.  Encourage patient participation in care  3.  Encourage acknowledgement of body changes and accompanying emotions  4.  Perform depression screening  Outcome: Progressing     Problem: Knowledge Deficit - Stroke Education  Goal: Patient's knowledge of stroke and risk factors will improve  Description: Target End Date:  1-3 days or as soon as patient condition allows    Document in Patient Education    1.  Stroke education booklet provided  2.  Education regarding EMS activation, need for follow up, medication prescribed at discharge, risk factors for stroke/lifestyle modifications, warning signs and symptoms of stroke provided  Outcome: Progressing       Patient is not progressing towards the following goals:      Problem: Mobility - Stroke  Goal: Patient's capacity to carry out activities will improve  Description: Target End Date:  Prior to discharge or change in level of care    1.  Assess for barriers to mobility/activity  2.  Implement activity per interdisciplinary team recommendations  3.  Target activity level identified and patient/family/caregiver aware of goal  4.  Provide assistive devices  5.  Instruct patient/caregiver on proper use of assistive/adaptive devices  6.  Schedule activities and rest periods to decrease effects of fatigue  7.  Encourage mobilization to extent of ability  8.  Maintain proper body alignment  9.  Provide adequate pain management to allow progressive mobilization  10. Implement  pace maker precautions as needed  Outcome: Not Progressing     Problem: Self Care  Goal: Patient will have the ability to perform ADLs independently or with assistance (bathe, groom, dress, toilet and feed)  Description: Target End Date:  Prior to discharge or change in level of care    Document on ADL flowsheet    1.  Assess the capability and level of deficiency to perform ADLs  2.  Encourage family/care giver involvement  3.  Provide assistive devices  4.  Consider PT/OT evaluations  5.  Maintain support, give positive feedback, encourage self-care allowing extra time and verbal cuing as needed  6.  Avoid doing something for patients they can do themselves, but provide assistance as needed  7.  Assist in anticipating/planning individual needs  8.  Collaborate with Case Management and  to meet discharge needs  Outcome: Not Progressing

## 2025-01-04 NOTE — THERAPY
"Occupational Therapy   Initial Evaluation     Patient Name: Cinda Perrin  Age:  69 y.o., Sex:  female  Medical Record #: [id number]  Today's Date: 1/4/2025     Precautions  Precautions: Fall Risk  Comments: Sade's    Assessment  Patient is 69 y.o. female with past medical history of Sade's disease, hypothyroidism who presented 1/3/2025 with acute onset right lower extremity weakness, R foot drop, and numbness.  Of note patient was hospitalized 12/30 and discharged yesterday for weakness and falls found to have hyponatremia which had resolved at time of discharge. MRI: More prominent patchy focus of increased T2 signal intensity in the left anterior frontal periventricular white matter also consistent with chronic microvascular ischemic gliosis.     Pt seen for OT eval. Pt presents with baseline deficits d/t Dickinson's Disease and now new onset of R foot drop. Pt with athetosis and chorea with movement, performed functional transfer to chair requiring Alan overall, difficulty d/t RLE impairments. Pt reports she cannot receive assistance for mobility at [facility], receives assist for showering only. Pt will continue to benefit from inptOT. Will recommend post acute placement upon dc.     Plan    Occupational Therapy Initial Treatment Plan   Treatment Interventions: Self Care / Activities of Daily Living, Adaptive Equipment, Neuro Re-Education / Balance, Therapeutic Exercises, Therapeutic Activity  Treatment Frequency: 3 Times per Week  Duration: Until Therapy Goals Met    DC Equipment Recommendations: Unable to determine at this time  Discharge Recommendations: Recommend post-acute placement for additional occupational therapy services prior to discharge home     Subjective    \"This started 1 week ago\" (R foot drop)      Objective       01/04/25 1202   Prior Living Situation   Prior Services Intermittent Physical Support for ADL Per Service   Housing / Facility Assisted Living Residence  ([facility])   Steps " Into Home 0   Steps In Home 0   Elevator Yes   Bathroom Set up Walk In Shower;Grab Bars;Shower Chair   Equipment Owned 4-Wheel Walker;Grab Bar(s) In Tub / Shower;Tub / Shower Seat;Grab Bar(s) By Toilet   Lives with - Patient's Self Care Capacity Attendant / Paid Care Giver   Comments Pt reports she receives assist only for showering. Cannot receive assistance for walking per pt   Prior Level of ADL Function   Self Feeding Independent   Grooming / Hygiene Independent   Bathing Requires Assist   Dressing Independent   Toileting Independent   Prior Level of IADL Function   Medication Management Independent   Laundry Dependent   Kitchen Mobility Independent   Finances Requires Assist   Home Management Dependent   Shopping Dependent   Prior Level Of Mobility Independent With Device in Home   History of Falls   History of Falls Yes   Date of Last Fall   (reason for last admit)   Precautions   Precautions Fall Risk   Comments Toribio's   Vitals   Pulse Oximetry 95 %   O2 Delivery Device None - Room Air   Pain   Intervention Declines   Pain 0 - 10 Group   Therapist Pain Assessment During Activity;Post Activity Pain Same as Prior to Activity;Nurse Notified  (no c/o pain)   Cognition    Cognition / Consciousness WDL   Level of Consciousness Alert   Comments pleasant and cooperative, able to make needs known   Passive ROM Upper Body   Passive ROM Upper Body WDL   Active ROM Upper Body   Active ROM Upper Body  WDL   Dominant Hand Right   Strength Upper Body   Upper Body Strength  WDL   Sensation Upper Body   Upper Extremity Sensation  WDL   Upper Body Muscle Tone   Upper Body Muscle Tone  WDL   Neurological Concerns   Neurological Concerns No   Coordination Upper Body   Coordination X   Comments athetosis   Balance Assessment   Sitting Balance (Static) Fair   Sitting Balance (Dynamic) Fair -   Standing Balance (Static) Poor +   Standing Balance (Dynamic) Poor   Weight Shift Sitting Fair   Weight Shift Standing Poor   Comments  w/FWW   Bed Mobility    Supine to Sit Standby Assist   Scooting Standby Assist   Rolling Standby Assist   Comments HOB elevated   ADL Assessment   Grooming Supervision;Seated   Upper Body Dressing Minimal Assist   Lower Body Dressing Minimal Assist   Toileting Maximal Assist  (purewick)   Functional Mobility   Sit to Stand Minimal Assist   Bed, Chair, Wheelchair Transfer Minimal Assist   Transfer Method Stand Step   Mobility sup>sit EOB> STS> chair   Comments w/FWW   Visual Perception   Visual Perception  WDL   Edema / Skin Assessment   Edema / Skin  X   Comments bruising on face and R hip from previous fall   Activity Tolerance   Sitting in Chair post session   Sitting Edge of Bed 5 min   Standing 2 min   Patient / Family Goals   Patient / Family Goal #1 to get better   Short Term Goals   Short Term Goal # 1 Pt will complete toilet transfers SBA   Short Term Goal # 2 Pt will complete LB dressing SBA   Short Term Goal # 3 Pt will complete toileting SBA   Education Group   Education Provided Role of Occupational Therapist;Activities of Daily Living   Role of Occupational Therapist Patient Response Patient;Acceptance;Demonstration;Explanation;Action Demonstration;Verbal Demonstration   ADL Patient Response Patient;Acceptance;Explanation;Demonstration;Verbal Demonstration;Action Demonstration   Occupational Therapy Initial Treatment Plan    Treatment Interventions Self Care / Activities of Daily Living;Adaptive Equipment;Neuro Re-Education / Balance;Therapeutic Exercises;Therapeutic Activity   Treatment Frequency 3 Times per Week   Duration Until Therapy Goals Met   Problem List   Problem List Decreased Active Daily Living Skills;Decreased Activity Tolerance;Decreased Functional Mobility;Safety Awareness Deficits / Cognition;Impaired Posture / Trunk Alignment   Anticipated Discharge Equipment and Recommendations   DC Equipment Recommendations Unable to determine at this time   Discharge Recommendations Recommend  post-acute placement for additional occupational therapy services prior to discharge home   Interdisciplinary Plan of Care Collaboration   IDT Collaboration with  Nursing;Physical Therapist   Patient Position at End of Therapy Seated;Chair Alarm On;Call Light within Reach;Tray Table within Reach   Collaboration Comments RN updated   Session Information   Date / Session Number  1/4, #1 (1/3, 1/10)

## 2025-01-04 NOTE — PROGRESS NOTES
4 Eyes Skin Assessment Completed by NEW Wiggins and NEW Lambert.    Head Scratch, Swelling, and Redness on right brow  Ears WDL  Nose Redness, scratches right side  Mouth WDL  Neck WDL  Breast/Chest Redness and Blanching  Shoulder Blades WDL  Spine WDL  (R) Arm/Elbow/Hand Redness, Blanching, Bruising, and Abrasion  (L) Arm/Elbow/Hand Redness and Blanching  Abdomen WDL  Groin Redness and Blanching  Scrotum/Coccyx/Buttocks Redness, Blanching, and Discoloration  (R) Leg Bruising on right hip  (L) Leg Scar and Bruising  (R) Heel/Foot/Toe Bruising and Swelling  (L) Heel/Foot/Toe Bruising and Scar          Devices In Places Tele Box, Blood Pressure Cuff, and Pulse Ox      Interventions In Place Pillows, Q2 Turns, Heels Loaded W/Pillows, and Pressure Redistribution Mattress    Possible Skin Injury No    Pictures Uploaded Into Epic N/A  Wound Consult Placed N/A  RN Wound Prevention Protocol Ordered No

## 2025-01-04 NOTE — PROGRESS NOTES
[facility] Medicine Daily Progress Note    Date of Service  1/4/2025    Chief Complaint  Cinda Perrin is a 69 y.o. female admitted 1/3/2025 with right sided weakness    Hospital Course  Cnida Perrin is a 69 y.o. female with past medical history of [place]'s disease and hypothyroidism. She presented to [facility] on 1/3/2025 with acute onset right lower extremity weakness and numbness.  Of note patient was hospitalized on 12/30 and discharged 1/2/25 after treatment for weakness and falls where she was found to have hyponatremia which had resolved at time of discharge.  Patient reported she went home and suddenly started having right lower extremity weakness with numbness. She also reported foot drop and difficulty ambulating with her walker as she felt like her foot was dragging across the floor.  She denied any right upper extremity weakness or numbness, dysarthria, or dysphagia.  Patient reported her weakness did not resolve and thus came back to the ER for evaluation.  She denied any falls post discharge, new onset back pain, fevers, chills, chest pain or shortness of breath.     The ER vital signs significant for blood pressure 174/84.  Labs significant for creatinine 1.1, GFR 54, AST 81, ALT 56, alkaline phosphatase 199.  Troponin negative.  CT head workup in ER within normal limits.  Chest x-ray reviewed showed no acute findings.   MRI: More prominent patchy focus of increased T2 signal intensity in the left anterior frontal periventricular white matter also consistent with chronic microvascular ischemic gliosis.     Interval Problem Update  Axox3, ongoing R foot drop, bradykinesia. No weakness, denies pain. Post acute therapy recommended, patient agrees to this, I consulted Dr. Benton neurology, he recommends EMG which can only be done as an outpatient. ROS otherwise negative    I have discussed this patient's plan of care and discharge plan at IDT rounds today with Case Management, Nursing, Nursing  leadership, and other members of the IDT team.    Consultants/Specialty    Code Status  DNAR/DNI    Disposition  The patient is not medically cleared for discharge to home or a post-acute facility.      I have placed the appropriate orders for post-discharge needs.    Review of Systems  Review of Systems   Constitutional: Negative.  Negative for chills, diaphoresis, fever, malaise/fatigue and weight loss.   HENT: Negative.  Negative for sore throat.    Eyes: Negative.  Negative for blurred vision.   Respiratory: Negative.  Negative for cough and shortness of breath.    Cardiovascular: Negative.  Negative for chest pain, palpitations and leg swelling.   Gastrointestinal: Negative.  Negative for abdominal pain, nausea and vomiting.   Genitourinary: Negative.  Negative for dysuria.   Musculoskeletal: Negative.  Negative for myalgias.   Skin: Negative.  Negative for itching and rash.   Neurological:  Positive for weakness. Negative for dizziness, focal weakness and headaches.   Endo/Heme/Allergies: Negative.  Does not bruise/bleed easily.   Psychiatric/Behavioral: Negative.  Negative for depression, substance abuse and suicidal ideas.    All other systems reviewed and are negative.       Physical Exam  Temp:  [36.3 °C (97.3 °F)-37.1 °C (98.8 °F)] 36.8 °C (98.2 °F)  Pulse:  [77-93] 85  Resp:  [16-17] 17  BP: (128-160)/(79-94) 128/89  SpO2:  [92 %-96 %] 95 %    Physical Exam  Vitals and nursing note reviewed. Exam conducted with a chaperone present.   Constitutional:       General: She is not in acute distress.     Appearance: Normal appearance. She is not diaphoretic.   HENT:      Head: Normocephalic.      Nose: Nose normal.      Mouth/Throat:      Mouth: Mucous membranes are moist.   Eyes:      Pupils: Pupils are equal, round, and reactive to light.   Cardiovascular:      Rate and Rhythm: Normal rate and regular rhythm.      Pulses: Normal pulses.      Heart sounds: Normal heart sounds.   Pulmonary:      Effort:  Pulmonary effort is normal.      Breath sounds: Normal breath sounds.   Abdominal:      General: Abdomen is flat. Bowel sounds are normal.      Palpations: Abdomen is soft.   Musculoskeletal:         General: No swelling or deformity. Normal range of motion.   Skin:     General: Skin is warm and dry.      Capillary Refill: Capillary refill takes less than 2 seconds.   Neurological:      Mental Status: She is alert and oriented to person, place, and time.      Cranial Nerves: No cranial nerve deficit.      Comments: Bradykinesia   R foot drop   Psychiatric:         Mood and Affect: Mood normal.         Behavior: Behavior normal.         Fluids    Intake/Output Summary (Last 24 hours) at 1/4/2025 1355  Last data filed at 1/4/2025 0619  Gross per 24 hour   Intake 476 ml   Output 650 ml   Net -174 ml        Laboratory  Recent Labs     01/02/25  0419 01/03/25  1120 01/04/25  0022   WBC 5.0 5.4 7.5   RBC 4.05* 4.61 4.30   HEMOGLOBIN 12.8 14.8 13.6   HEMATOCRIT 37.2 42.7 39.4   MCV 91.9 92.6 91.6   MCH 31.6 32.1 31.6   MCHC 34.4 34.7 34.5   RDW 43.9 47.9 48.1   PLATELETCT 276 365 356   MPV 9.6 9.3 9.7     Recent Labs     01/02/25  0419 01/02/25  1044 01/03/25  1120 01/04/25  0205   SODIUM 133* 135 136 133*   POTASSIUM 4.4  --  4.6 3.9   CHLORIDE 100  --  98 96   CO2 24  --  26 26   GLUCOSE 91  --  107* 90   BUN 8  --  8 9   CREATININE 0.91  --  1.10 0.99   CALCIUM 8.6  --  10.1 9.6     Recent Labs     01/03/25  1120   APTT 24.5*   INR 0.96         Recent Labs     01/04/25  0205   TRIGLYCERIDE 141   HDL 57   LDL 68       Imaging  MR-BRAIN-W/O   Final Result         1. Age-related cerebral atrophy.      2. Mild periventricular white matter changes consistent with chronic microvascular ischemic gliosis.      3. More prominent patchy focus of increased T2 signal intensity in the left anterior frontal periventricular white matter also consistent with chronic microvascular ischemic gliosis.      DX-CHEST-PORTABLE (1 VIEW)  "  Final Result      No acute cardiac or pulmonary abnormalities are identified.      CT-CTA NECK WITH & W/O-POST PROCESSING   Final Result      No occlusion or hemodynamically significant stenosis of the neck arteries.      CT-CTA HEAD WITH & W/O-POST PROCESS   Final Result      No large vessel occlusion, hemodynamically significant stenosis or aneurysm.      CT-CEREBRAL PERFUSION ANALYSIS   Final Result      1. Cerebral blood flow less than 30% possibly representing completed infarct = 0 mL. Based on distribution of this finding, this is unlikely to represent artifact.      2. T Max more than 6 seconds possibly representing combination of completed infarct and ischemia = 0 mL. Based on the distribution of this finding, this is unlikely to represent artifact.      3. Mismatched volume possibly representing ischemic brain/penumbra= 0 mL      4.  Please note that this cerebral perfusion study and report is Quantitative and targets supratentorial (cerebral) perfusion for evaluation of large vessel territory acute ischemia/infarction. For example, lacunar infarcts, and brainstem/posterior fossa    ischemia/infarction are not evaluated on this study.  Data acquisition is subject to artifacts which can yield non-anatomically plausible perfusion maps which may be due to motion, bolus timing, signal to noise ratio, or other technical factors.    Perfusion map abnormalities which show non-anatomic distributions are likely artifact.   This study is not \"stand-alone\" and should only be utilized for diagnosis, management/treatment in correlation with CT, CTA, and/or MRI and clinical factors.         CT-HEAD W/O   Final Result      1. No acute intracranial hemorrhage.   2. Atrophy and diffuse chronic microangiopathic white matter changes versus demyelination gliosis.   3. Hypodensity in the left frontal lobe periventricular white matter measuring 11 mm in diameter. It could represent edema, demyelination or encephalomalacia.    "                 Assessment/Plan  * Acute weakness- (present on admission)  Assessment & Plan  Acute right lower extremity weakness with foot drop and numbness/tingling, no further numbness or tingling  No evidence of stroke on MRI  I discussed with neurology who recommends outpatient emg  CTA workup in ER negative for large vessel occlusion  Symptoms started yesterday, outside window for tPA  PT, OT  Post acute placement pending    Transaminitis- (present on admission)  Assessment & Plan  Appears chronic  Following  Cmp in am     GERD without esophagitis- (present on admission)  Assessment & Plan  Continue PPI    Benign hypertension with CKD (chronic kidney disease) stage III- (present on admission)  Assessment & Plan  Continue lisinopril  As needed antihypertensives, no need for permissive hypertension    Insomnia due to medical condition- (present on admission)  Assessment & Plan  Continue Remeron    Acquired hypothyroidism- (present on admission)  Assessment & Plan  Continue home levothyroxine    Mixed dyslipidemia- (present on admission)  Assessment & Plan  Continue colestipol  Patient with mild AST/ALT elevation    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 In review of yesterday's note (1/3/2025), there are no changes except as documented above.

## 2025-01-04 NOTE — PROGRESS NOTES
Monitor summary: SR 77-87, IL .15, QRS .06, QT .37 with rare PVCs per strip from monitor room.

## 2025-01-04 NOTE — DISCHARGE PLANNING
Care Transition Team Assessment    Patient is a 69 year-old female admitted for acute weakness. Please see patient's H&P for prior medical history. Patient was previously admitted on 12/30/2024 to 01/02/2025 for hyponatremia. LMSW met with patient at bedside to complete assessment. Patient is A&Ox4 and able to verify the information on the face sheet. Patient lives at an assisted living in a single story house with zero steps to enter, Joni Iverson (p) [phone]; DPOA and emergency contact. No Advance Directive on file, patient currently working on completing AD and will provide hospital with copy. Prior to admission patient is independent with ADL's and IADL’s. Patient does not use any DME at baseline. Patient reported that her family is good support for her. Patient receives monthly SSI deposits. The patient's PCP is Dr. Fraire. Patient's preferred pharmacy is [facility]. Patient denies a history of SNF/IPR nor HHC use in the past. Patient denies any SA or MH concerns. Patient's confirmed medical coverage via Medicare. Patient has means of transportation when medically cleared and son will provide transport once stable for discharge. Patient is interested in Acute Rehab, [facility] following at this time. Obtained choice for acute rehab and faxed to [facility].    Information Source  Orientation Level: Oriented X4  Information Given By: Patient  Who is responsible for making decisions for patient? : Patient    Readmission Evaluation  Is this a readmission?: Yes - unplanned readmission    Elopement Risk  Legal Hold: No  Ambulatory or Self Mobile in Wheelchair: No-Not an Elopement Risk  Elopement Risk: Not at Risk for Elopement    Interdisciplinary Discharge Planning  Lives with - Patient's Self Care Capacity: Attendant / Paid Care Giver  Housing / Facility: Assisted Living Residence ([place])  Prior Services: Intermittent Physical Support for ADL Per Service    Discharge Preparedness  What is your plan  after discharge?: Other (comment)  What are your discharge supports?: Child  Prior Functional Level: Ambulatory  Difficulity with ADLs: None  Difficulity with IADLs: None    Functional Assesment  Prior Functional Level: Ambulatory    Finances  Financial Barriers to Discharge: No  Prescription Coverage: Yes              Advance Directive  Advance Directive?: DPOA for Health Care  Durable Power of  Name and Contact : Joni    Domestic Abuse  Have you ever been the victim of abuse or violence?: No    Psychological Assessment  History of Substance Abuse: None  History of Psychiatric Problems: No  Non-compliant with Treatment: No  Newly Diagnosed Illness: No    Discharge Risks or Barriers  Discharge risks or barriers?: Complex medical needs  Patient risk factors: Complex medical needs    Anticipated Discharge Information  Discharge Disposition: Disch to IP rehab facility or distinct part unit (62)

## 2025-01-04 NOTE — DISCHARGE PLANNING
Physiatry consult remains pending  Would appreciate therapy evaluations as appropriate.  TCC remains following    [facility] Medicine  Progress Note    Patient Name: Thierry Ho  MRN: [id number]  Patient Class: IP- Inpatient   Admission Date: 10/11/2024  Length of Stay: 1 days  Attending Physician: Tejas Rodriguez MD  Primary Care Provider: Rhonda Krishnan MD        Subjective:     Principal Problem:Uncontrolled type 2 diabetes mellitus with hyperglycemia        HPI:  Thierry Ho is a 63 y.o. male with  cocaine and EtOH use,  Insulin-dependent DM2 without complications, HTN, and HLD who presented to [facility] ED on 10/11/2024 for eval and treatment of AMS.  Patient can remember very little over the last 24 hours.  Per ED report, nephew called EMS with concern for hallucinations just prior to presentation.  EMS discovered BG 33, gave an amp of D50 en route to [facility].  Patient was more alert with  on arrival, reports he takes his medication but is unsure about how much and when.  BG soon dropped back down to 100 without any insulin given.  Patient reports being shaken by the prospect of dying of hypoglycemia had his nephew not discovered him and called 911.  Denies any symptoms or discomfort at the time of initial HM interview.  Ct head, cspine, abd/pelvis acutely negative (was ttp in lower abd). abnl labs: wbc 14, co2 16, lactate 4, mag 1.4, [id number], , trop 0.03.  CT H/S/A/P all acutely negative.  ED therapy/stabilization measures:  Home BP meds and IVF given.  They were placed in observation under the care of [facility] for further evaluation and treatment.       Overview/Hospital Course:  No notes on file    Interval History:  Patient seen and examined. NAD. Creatinine downtrending 1.5.  Continue IV with  cc/hour.  Patient denies any symptoms however states his legs are slightly swollen bilaterally.  On examination 1+ edema bilaterally.  Patient told to keep legs elevated and compression socks to be given by nurse.    Review of Systems   Reason unable to perform ROS:  ROS was performed and pertinent +s and -s are listed in HPI.     Objective:     Vital Signs (Most Recent):  Temp: 98.3 °F (36.8 °C) (10/14/24 1527)  Pulse: 74 (10/14/24 1527)  Resp: 18 (10/14/24 1527)  BP: (!) 190/97 (10/14/24 1527)  SpO2: 98 % (10/14/24 1527) Vital Signs (24h Range):  Temp:  [97.7 °F (36.5 °C)-98.4 °F (36.9 °C)] 98.3 °F (36.8 °C)  Pulse:  [64-77] 74  Resp:  [18] 18  SpO2:  [95 %-98 %] 98 %  BP: (146-190)/(76-97) 190/97     Weight: 108.3 kg (238 lb 12.1 oz)  Body mass index is 33.3 kg/m².    Intake/Output Summary (Last 24 hours) at 10/14/2024 1705  Last data filed at 10/14/2024 1527  Gross per 24 hour   Intake 600 ml   Output 2050 ml   Net -1450 ml         Physical Exam  Constitutional:       General: He is not in acute distress.     Appearance: Normal appearance. He is not toxic-appearing or diaphoretic.   HENT:      Head: Normocephalic.   Cardiovascular:      Rate and Rhythm: Normal rate and regular rhythm.      Pulses: Normal pulses.      Heart sounds: Normal heart sounds.   Pulmonary:      Effort: Pulmonary effort is normal.      Breath sounds: Normal breath sounds.   Abdominal:      General: Abdomen is flat. Bowel sounds are normal.      Tenderness: There is no abdominal tenderness. There is no guarding.   Musculoskeletal:      Right lower le+ Edema present.      Left lower le+ Edema present.   Skin:     General: Skin is warm and dry.      Capillary Refill: Capillary refill takes less than 2 seconds.      Coloration: Skin is not jaundiced.   Neurological:      General: No focal deficit present.      Mental Status: He is alert and oriented to person, place, and time.   Psychiatric:         Mood and Affect: Mood normal.         Behavior: Behavior normal.             Significant Labs: All pertinent labs within the past 24 hours have been reviewed.    Significant Imaging: I have reviewed all pertinent imaging results/findings within the past 24 hours.  Imaging Results              CT Head  Without Contrast (Final result)  Result time 10/11/24 12:06:58      Final result by Finesse Mitchell MD (10/11/24 12:06:58)                   Impression:      1. Allowing for motion artifact, no convincing acute intracranial abnormalities noting sequela of chronic microvascular ischemic change and senescent change.      Electronically signed by: Finesse Mitchell MD  Date:    10/11/2024  Time:    12:06               Narrative:    EXAMINATION:  CT HEAD WITHOUT CONTRAST    CLINICAL HISTORY:  Mental status change, unknown cause;confusion, resolved hypoglycemia. nausea.;    TECHNIQUE:  Low dose axial images were obtained through the head.  Coronal and sagittal reformations were also performed. Contrast was not administered.    COMPARISON:  None.    FINDINGS:  There is motion artifact.    There is generalized cerebral volume loss.  There is hypoattenuation in a periventricular fashion, likely sequela of chronic microvascular ischemic change.  There is no evidence of acute major vascular territory infarct, hemorrhage, or mass.  There is no hydrocephalus.  There are no abnormal extra-axial fluid collections.  The paranasal sinuses and mastoid air cells are clear, and there is no evidence of calvarial fracture.  The visualized soft tissues are unremarkable.                                       CT Cervical Spine Without Contrast (Final result)  Result time 10/11/24 12:11:10      Final result by Finesse Mitchell MD (10/11/24 12:11:10)                   Impression:      1. No acute displaced fracture or dislocation of the cervical spine noting degenerative changes and additional findings above.      Electronically signed by: Finesse Mitchell MD  Date:    10/11/2024  Time:    12:11               Narrative:    EXAMINATION:  CT CERVICAL SPINE WITHOUT CONTRAST    CLINICAL HISTORY:  Polytrauma, blunt;unsure if fall. confusion, nausea, hypoglycemia, abd pain;    TECHNIQUE:  Low dose axial images, sagittal and coronal  reformations were performed though the cervical spine.  Contrast was not administered.    COMPARISON:  None    FINDINGS:  There is motion artifact.  There is osteopenia.    The visualized portions of the lung apices are unremarkable.  The thyroid gland, parotid glands, and remaining visualized salivary glands are grossly unremarkable.  No tonsillar enlargement.  No significant cervical lymphadenopathy.  The posterior paraspinous and hypopharyngeal soft tissues are unremarkable.    Sagittal reformatted imaging demonstrates adequate alignment of the cervical spine without significant vertebral body height loss.  There is multilevel disc degenerative change and multilevel anterior marginal osteophytosis.  There is multilevel facet arthropathy.  No convincing acute displaced fracture or dislocation of the cervical spine.  Motion artifact limits evaluation for spondylitic changes.  There is multilevel moderate spinal canal stenosis and neuroforaminal narrowing.  The airway is patent.                                       CT Abdomen Pelvis With IV Contrast NO Oral Contrast (Final result)  Result time 10/11/24 11:36:46      Final result by Bud Corbin MD (10/11/24 11:36:46)                   Impression:      No etiology for abdominal pain identified.      Electronically signed by: Bud Corbin MD  Date:    10/11/2024  Time:    11:36               Narrative:    EXAMINATION:  CT ABDOMEN PELVIS WITH IV CONTRAST    CLINICAL HISTORY:  Abdominal pain, acute, nonlocalized;diffuse lower abd pain, nausea;    TECHNIQUE:  Low dose axial images, sagittal and coronal reformations were obtained from the lung bases to the pubic symphysis following the IV administration of 100 mL of Omnipaque 350 and the oral administration of 30 mL of Omnipaque 350.    COMPARISON:  None.    FINDINGS:  Abdomen: No liver masses.  The gallbladder is unremarkable.  No biliary or pancreatic ductal dilatation.  Splenic size within normal limits.  Adrenal  glands unremarkable.  Small left upper pole renal cyst noted.  No renal masses or hydronephrosis.  Abdominal aorta tapers normally.  Mild scattered calcific atherosclerosis.    Pelvis: Bladder is distended.  Prostate unremarkable.  No fluid collections.  No inguinal or pelvic lymphadenopathy.    Bowel/mesentery: The terminal ileum is unremarkable.  No dilated appendix visualized.  There is mild fat stranding in the mesentery.  No fluid collections.  No mesenteric lymphadenopathy.  Small hiatal hernia.    Bones: No marrow replacement process.  Degenerative changes of lumbar spine and hips noted.    Lung bases: Mild dependent interstitial thickening.                                       X-Ray Chest AP Portable (Final result)  Result time 10/11/24 10:14:50      Final result by Bud Corbin MD (10/11/24 10:14:50)                   Impression:      No acute findings.      Electronically signed by: Bud Corbin MD  Date:    10/11/2024  Time:    10:14               Narrative:    EXAMINATION:  XR CHEST AP PORTABLE    CLINICAL HISTORY:  Nausea    TECHNIQUE:  Single frontal view of the chest was performed.    COMPARISON:  07/18/2023    FINDINGS:  Enlarged cardiac silhouette.The lungs are grossly clear.  No pneumothorax.No pleural effusions.                                        Assessment/Plan:      * Uncontrolled type 2 diabetes mellitus with hyperglycemia  Patient's FSGs are uncontrolled due to hypoglycemia on current medication regimen, which includes 40U glargine BID at home.  BG was persistently low after arrival; suspect he is taking too much basal.    Last A1c reviewed-   Lab Results   Component Value Date    HGBA1C 6.2 (H) 10/12/2024     Most recent fingerstick glucose reviewed-   Recent Labs   Lab 10/13/24  1924 10/14/24  0737 10/14/24  1057 10/14/24  1524   POCTGLUCOSE 225* 118* 235* 107       Current correctional scale  Medium  Maintain anti-hyperglycemic dose as follows-   Antihyperglycemics (From admission,  onward)      Start     Stop Route Frequency Ordered    10/12/24 0900  insulin glargine U-100 (Lantus) pen 15 Units         -- SubQ Daily 10/11/24 1614    10/11/24 1709  insulin aspart U-100 pen 0-10 Units         -- SubQ Before meals & nightly PRN 10/11/24 1614          Hold Oral hypoglycemics while patient is in the hospital.    Alcohol abuse  [facility] protocol  Multivitamin, folic acid, thiamine  As needed meds for withdrawals     KENNETH (acute kidney injury)  KENNETH is likely due to pre-renal azotemia due to dehydration. Baseline creatinine is unknown. Most recent creatinine and eGFR are listed below.  Recent Labs     10/13/24  0629 10/13/24  1559 10/14/24  0545   CREATININE 1.9* 1.8* 1.5*   EGFRNORACEVR 39* 42* 52*        Plan  - KENNETH is worsening. Will adjust treatment as follows: IVF  - Avoid nephrotoxins and renally dose meds for GFR listed above  - Monitor urine output, serial BMP, and adjust therapy as needed  - Renal US:  No acute abnormality, 2 cm left renal simple cyst seen  - Can consider nephrology consult    Cocaine abuse  Consulted tele psych recommend Intensive outpt txmt for alcohol and cocaine.       Nonadherence to medication  Cocaine abuse    Patient thinks he likely took incorrect doses of his home medications due to cocaine usage.  Views this incident as a wake-up call, especially because he could have  of hypoglycemia had his nephew not discovered him and called EMS.  He requests a visit from addiction psych to help him stop taking cocaine.      Non morbid obesity, unspecified obesity type  Body mass index is 33.3 kg/m². Morbid obesity complicates all aspects of disease management from diagnostic modalities to treatment. Weight loss encouraged and health benefits explained to patient.         Coronary artery disease involving native coronary artery of native heart without angina pectoris  Asa and statin   Tele monitoring     Essential hypertension  Patients blood pressure range in the last 24 hours  was: BP  Min: 118/64  Max: 236/101.The patient's inpatient anti-hypertensive regimen is listed below:  Current Antihypertensives  carvediloL tablet 25 mg, 2 times daily with meals, Oral  hydrALAZINE tablet 25 mg, Every 12 hours, Oral  lisinopriL tablet 40 mg, Daily, Oral    Plan  -resume above BP meds     Other hyperlipidemia  Stable.  Continue home meds.        VTE Risk Mitigation (From admission, onward)           Ordered     heparin (porcine) injection 5,000 Units  Every 8 hours         10/11/24 1614     IP VTE HIGH RISK PATIENT  Once         10/11/24 1614     Place sequential compression device  Until discontinued         10/11/24 1614                    Discharge Planning   CARLA:      Code Status: Full Code   Is the patient medically ready for discharge?:     Reason for patient still in hospital (select all that apply): Patient trending condition and Treatment  Discharge Plan A: Home with family   Discharge Delays: None known at this time              Tommie Hitchcock PA-C  Department of Hospital Medicine   [facility]

## 2025-01-05 ENCOUNTER — APPOINTMENT (OUTPATIENT)
Dept: RADIOLOGY | Facility: MEDICAL CENTER | Age: 70
DRG: 565 | End: 2025-01-05
Attending: HOSPITALIST
Payer: MEDICARE

## 2025-01-05 PROBLEM — I95.9 HYPOTENSION: Status: ACTIVE | Noted: 2025-01-05

## 2025-01-05 PROBLEM — D72.829 LEUKOCYTOSIS: Status: ACTIVE | Noted: 2025-01-05

## 2025-01-05 LAB
ALBUMIN SERPL BCP-MCNC: 3.6 G/DL (ref 3.2–4.9)
ALBUMIN/GLOB SERPL: 1.2 G/DL
ALP SERPL-CCNC: 180 U/L (ref 30–99)
ALT SERPL-CCNC: 36 U/L (ref 2–50)
ANION GAP SERPL CALC-SCNC: 13 MMOL/L (ref 7–16)
AST SERPL-CCNC: 39 U/L (ref 12–45)
BASOPHILS # BLD AUTO: 0.4 % (ref 0–1.8)
BASOPHILS # BLD: 0.04 K/UL (ref 0–0.12)
BILIRUB SERPL-MCNC: 0.4 MG/DL (ref 0.1–1.5)
BUN SERPL-MCNC: 14 MG/DL (ref 8–22)
CALCIUM ALBUM COR SERPL-MCNC: 9.9 MG/DL (ref 8.5–10.5)
CALCIUM SERPL-MCNC: 9.6 MG/DL (ref 8.5–10.5)
CHLORIDE SERPL-SCNC: 91 MMOL/L (ref 96–112)
CO2 SERPL-SCNC: 24 MMOL/L (ref 20–33)
CREAT SERPL-MCNC: 1.16 MG/DL (ref 0.5–1.4)
EOSINOPHIL # BLD AUTO: 0.06 K/UL (ref 0–0.51)
EOSINOPHIL NFR BLD: 0.5 % (ref 0–6.9)
ERYTHROCYTE [DISTWIDTH] IN BLOOD BY AUTOMATED COUNT: 47.6 FL (ref 35.9–50)
GFR SERPLBLD CREATININE-BSD FMLA CKD-EPI: 51 ML/MIN/1.73 M 2
GLOBULIN SER CALC-MCNC: 3 G/DL (ref 1.9–3.5)
GLUCOSE SERPL-MCNC: 150 MG/DL (ref 65–99)
HCT VFR BLD AUTO: 39.7 % (ref 37–47)
HGB BLD-MCNC: 13.4 G/DL (ref 12–16)
IMM GRANULOCYTES # BLD AUTO: 0.05 K/UL (ref 0–0.11)
IMM GRANULOCYTES NFR BLD AUTO: 0.5 % (ref 0–0.9)
LYMPHOCYTES # BLD AUTO: 0.75 K/UL (ref 1–4.8)
LYMPHOCYTES NFR BLD: 6.8 % (ref 22–41)
MCH RBC QN AUTO: 31.5 PG (ref 27–33)
MCHC RBC AUTO-ENTMCNC: 33.8 G/DL (ref 32.2–35.5)
MCV RBC AUTO: 93.2 FL (ref 81.4–97.8)
MONOCYTES # BLD AUTO: 0.71 K/UL (ref 0–0.85)
MONOCYTES NFR BLD AUTO: 6.4 % (ref 0–13.4)
NEUTROPHILS # BLD AUTO: 9.45 K/UL (ref 1.82–7.42)
NEUTROPHILS NFR BLD: 85.4 % (ref 44–72)
NRBC # BLD AUTO: 0 K/UL
NRBC BLD-RTO: 0 /100 WBC (ref 0–0.2)
PLATELET # BLD AUTO: 327 K/UL (ref 164–446)
PMV BLD AUTO: 9.3 FL (ref 9–12.9)
POTASSIUM SERPL-SCNC: 4.5 MMOL/L (ref 3.6–5.5)
PROT SERPL-MCNC: 6.6 G/DL (ref 6–8.2)
RBC # BLD AUTO: 4.26 M/UL (ref 4.2–5.4)
SODIUM SERPL-SCNC: 128 MMOL/L (ref 135–145)
WBC # BLD AUTO: 11.1 K/UL (ref 4.8–10.8)

## 2025-01-05 PROCEDURE — 72158 MRI LUMBAR SPINE W/O & W/DYE: CPT

## 2025-01-05 PROCEDURE — 99232 SBSQ HOSP IP/OBS MODERATE 35: CPT | Performed by: PSYCHIATRY & NEUROLOGY

## 2025-01-05 PROCEDURE — L4398 FOOT DROP SPLINT PRE OTS: HCPCS

## 2025-01-05 PROCEDURE — 700102 HCHG RX REV CODE 250 W/ 637 OVERRIDE(OP): Performed by: INTERNAL MEDICINE

## 2025-01-05 PROCEDURE — A9270 NON-COVERED ITEM OR SERVICE: HCPCS | Performed by: HOSPITALIST

## 2025-01-05 PROCEDURE — 80053 COMPREHEN METABOLIC PANEL: CPT

## 2025-01-05 PROCEDURE — 700102 HCHG RX REV CODE 250 W/ 637 OVERRIDE(OP): Performed by: HOSPITALIST

## 2025-01-05 PROCEDURE — 700111 HCHG RX REV CODE 636 W/ 250 OVERRIDE (IP): Mod: JZ | Performed by: INTERNAL MEDICINE

## 2025-01-05 PROCEDURE — 92523 SPEECH SOUND LANG COMPREHEN: CPT

## 2025-01-05 PROCEDURE — 99233 SBSQ HOSP IP/OBS HIGH 50: CPT | Performed by: HOSPITALIST

## 2025-01-05 PROCEDURE — 73723 MRI JOINT LWR EXTR W/O&W/DYE: CPT | Mod: RT

## 2025-01-05 PROCEDURE — 36415 COLL VENOUS BLD VENIPUNCTURE: CPT

## 2025-01-05 PROCEDURE — A9579 GAD-BASE MR CONTRAST NOS,1ML: HCPCS | Mod: JZ | Performed by: HOSPITALIST

## 2025-01-05 PROCEDURE — 99255 IP/OBS CONSLTJ NEW/EST HI 80: CPT | Performed by: PHYSICAL MEDICINE & REHABILITATION

## 2025-01-05 PROCEDURE — 700117 HCHG RX CONTRAST REV CODE 255: Mod: JZ | Performed by: HOSPITALIST

## 2025-01-05 PROCEDURE — 770020 HCHG ROOM/CARE - TELE (206)

## 2025-01-05 PROCEDURE — A9270 NON-COVERED ITEM OR SERVICE: HCPCS | Performed by: INTERNAL MEDICINE

## 2025-01-05 PROCEDURE — 700105 HCHG RX REV CODE 258

## 2025-01-05 PROCEDURE — 92526 ORAL FUNCTION THERAPY: CPT

## 2025-01-05 PROCEDURE — 85025 COMPLETE CBC W/AUTO DIFF WBC: CPT

## 2025-01-05 RX ORDER — DIAZEPAM 10 MG/2ML
5 INJECTION, SOLUTION INTRAMUSCULAR; INTRAVENOUS
Status: COMPLETED | OUTPATIENT
Start: 2025-01-05 | End: 2025-01-06

## 2025-01-05 RX ORDER — SODIUM CHLORIDE, SODIUM LACTATE, POTASSIUM CHLORIDE, AND CALCIUM CHLORIDE .6; .31; .03; .02 G/100ML; G/100ML; G/100ML; G/100ML
500 INJECTION, SOLUTION INTRAVENOUS ONCE
Status: COMPLETED | OUTPATIENT
Start: 2025-01-05 | End: 2025-01-05

## 2025-01-05 RX ADMIN — MIRTAZAPINE 60 MG: 15 TABLET, FILM COATED ORAL at 17:36

## 2025-01-05 RX ADMIN — DEUTETRABENAZINE 2 TABLET: 12 TABLET, COATED ORAL at 21:38

## 2025-01-05 RX ADMIN — SENNOSIDES AND DOCUSATE SODIUM 1 TABLET: 50; 8.6 TABLET ORAL at 04:57

## 2025-01-05 RX ADMIN — LEVOTHYROXINE SODIUM 50 MCG: 50 TABLET ORAL at 04:57

## 2025-01-05 RX ADMIN — OMEPRAZOLE 20 MG: 20 CAPSULE, DELAYED RELEASE ORAL at 04:56

## 2025-01-05 RX ADMIN — GADOTERIDOL 10 ML: 279.3 INJECTION, SOLUTION INTRAVENOUS at 03:03

## 2025-01-05 RX ADMIN — SODIUM CHLORIDE, POTASSIUM CHLORIDE, SODIUM LACTATE AND CALCIUM CHLORIDE 500 ML: 600; 310; 30; 20 INJECTION, SOLUTION INTRAVENOUS at 03:59

## 2025-01-05 RX ADMIN — DEUTETRABENAZINE 2 TABLET: 12 TABLET, COATED ORAL at 08:36

## 2025-01-05 RX ADMIN — ASPIRIN 81 MG: 81 TABLET, CHEWABLE ORAL at 04:57

## 2025-01-05 RX ADMIN — DIAZEPAM 5 MG: 5 TABLET ORAL at 01:18

## 2025-01-05 RX ADMIN — ENOXAPARIN SODIUM 40 MG: 100 INJECTION SUBCUTANEOUS at 17:36

## 2025-01-05 RX ADMIN — COLESTIPOL HYDROCHLORIDE 1 G: 1 TABLET, FILM COATED ORAL at 21:38

## 2025-01-05 RX ADMIN — POTASSIUM CHLORIDE 10 MEQ: 750 TABLET, EXTENDED RELEASE ORAL at 04:57

## 2025-01-05 ASSESSMENT — ENCOUNTER SYMPTOMS
CARDIOVASCULAR NEGATIVE: 1
CONSTITUTIONAL NEGATIVE: 1
HEADACHES: 0
RESPIRATORY NEGATIVE: 1
FEVER: 0
SHORTNESS OF BREATH: 0
CHILLS: 0
BLURRED VISION: 0
EYES NEGATIVE: 1
DIAPHORESIS: 0
WEIGHT LOSS: 0
DEPRESSION: 0
SORE THROAT: 0
COUGH: 0
BRUISES/BLEEDS EASILY: 0
WEAKNESS: 1
ABDOMINAL PAIN: 0
VOMITING: 0
MUSCULOSKELETAL NEGATIVE: 1
FOCAL WEAKNESS: 0
PALPITATIONS: 0
PSYCHIATRIC NEGATIVE: 1
DIZZINESS: 0
GASTROINTESTINAL NEGATIVE: 1
NAUSEA: 0
MYALGIAS: 0

## 2025-01-05 ASSESSMENT — COGNITIVE AND FUNCTIONAL STATUS - GENERAL
MOBILITY SCORE: 14
MOVING TO AND FROM BED TO CHAIR: A LITTLE
MOVING FROM LYING ON BACK TO SITTING ON SIDE OF FLAT BED: A LOT
CLIMB 3 TO 5 STEPS WITH RAILING: A LOT
TOILETING: A LOT
TURNING FROM BACK TO SIDE WHILE IN FLAT BAD: A LITTLE
SUGGESTED CMS G CODE MODIFIER DAILY ACTIVITY: CK
DRESSING REGULAR LOWER BODY CLOTHING: A LITTLE
PERSONAL GROOMING: A LITTLE
STANDING UP FROM CHAIR USING ARMS: A LOT
WALKING IN HOSPITAL ROOM: A LOT
DAILY ACTIVITIY SCORE: 18
HELP NEEDED FOR BATHING: A LITTLE
DRESSING REGULAR UPPER BODY CLOTHING: A LITTLE
SUGGESTED CMS G CODE MODIFIER MOBILITY: CL

## 2025-01-05 ASSESSMENT — LIFESTYLE VARIABLES: SUBSTANCE_ABUSE: 0

## 2025-01-05 ASSESSMENT — PAIN DESCRIPTION - PAIN TYPE
TYPE: ACUTE PAIN
TYPE: ACUTE PAIN

## 2025-01-05 ASSESSMENT — FIBROSIS 4 INDEX: FIB4 SCORE: 2.1

## 2025-01-05 NOTE — PROGRESS NOTES
Referring Physician: Felipa Baker M.D.    S:  Completed MRI of the lumbar spine and right hip were completed. Patient reports unchanged symptoms. Foot drop is the same. No new neurologic symptoms. Some dorsiflexion observed by RN.     Scheduled Medications   Medication Dose Frequency    senna-docusate  1 Tablet DAILY    enoxaparin (LOVENOX) injection  40 mg DAILY AT 1800    aspirin  81 mg DAILY    Pharmacy   PHARMACY TO DOSE    colestipol  1 g Nightly    levothyroxine  50 mcg AM ES    [Held by provider] lisinopril  10 mg DAILY    mirtazapine  60 mg AFTER DINNER    potassium chloride ER  10 mEq DAILY    omeprazole  20 mg DAILY    Deutetrabenazine  24 mg BID     O:    Vitals:    01/05/25 0845   BP: 103/78   Pulse: 76   Resp:    Temp:    SpO2:      Mental status: Awake, alert and oriented.  Attention is intact.  Answers questions appropriately.  Language: No aphasia. Mild dysarthria.   Cranial nerves:               Pupils are equal, round and reactive to light   Dysconjugate  Interrupted pursuits with incomplete range  Face appears symmetric  Hearing is intact to conversation  Motor:   Motor impersistence. Does better with encouragement and repeat motor commands.  Right foot drop (0/4 dorsiflexion; 4/5 plantarflexion).  4+ power in the bilateral upper extremities  4+ in the left lower extremity  4+ proximally in the right lower extremity  Minimal chorea  Moderate bradykinesia  No dystonia   Reflexes: Deep tendon reflexes 3 and symmetric in the bilateral upper and lower extremities. Plantar responses is flexor on the right; equivocal on the left. No clonus.  Raisa's left greater than right.  Sensory: Intact to light-touch and temperature. Intact vibratory and position sense.   Coordination: No obvious dysmetria.  Gait: Not tested due to patient preference.       MR-LUMBAR SPINE-WITH & W/O   Final Result         1.  Mild levoscoliosis of the lumbar spine.      2.  Minimal lumbar spondylotic changes at the L4-5 and  L5-S1 levels.      3.  No evidence of disc extrusion or significant compression of the thecal sac. Further is no evidence of central canal or neural foraminal stenosis.      MR-BRAIN-W/O   Final Result         1. Age-related cerebral atrophy.      2. Mild periventricular white matter changes consistent with chronic microvascular ischemic gliosis.      3. More prominent patchy focus of increased T2 signal intensity in the left anterior frontal periventricular white matter also consistent with chronic microvascular ischemic gliosis.      DX-CHEST-PORTABLE (1 VIEW)   Final Result      No acute cardiac or pulmonary abnormalities are identified.      CT-CTA NECK WITH & W/O-POST PROCESSING   Final Result      No occlusion or hemodynamically significant stenosis of the neck arteries.      CT-CTA HEAD WITH & W/O-POST PROCESS   Final Result      No large vessel occlusion, hemodynamically significant stenosis or aneurysm.      CT-CEREBRAL PERFUSION ANALYSIS   Final Result      1. Cerebral blood flow less than 30% possibly representing completed infarct = 0 mL. Based on distribution of this finding, this is unlikely to represent artifact.      2. T Max more than 6 seconds possibly representing combination of completed infarct and ischemia = 0 mL. Based on the distribution of this finding, this is unlikely to represent artifact.      3. Mismatched volume possibly representing ischemic brain/penumbra= 0 mL      4.  Please note that this cerebral perfusion study and report is Quantitative and targets supratentorial (cerebral) perfusion for evaluation of large vessel territory acute ischemia/infarction. For example, lacunar infarcts, and brainstem/posterior fossa    ischemia/infarction are not evaluated on this study.  Data acquisition is subject to artifacts which can yield non-anatomically plausible perfusion maps which may be due to motion, bolus timing, signal to noise ratio, or other technical factors.    Perfusion map  "abnormalities which show non-anatomic distributions are likely artifact.   This study is not \"stand-alone\" and should only be utilized for diagnosis, management/treatment in correlation with CT, CTA, and/or MRI and clinical factors.         CT-HEAD W/O   Final Result      1. No acute intracranial hemorrhage.   2. Atrophy and diffuse chronic microangiopathic white matter changes versus demyelination gliosis.   3. Hypodensity in the left frontal lobe periventricular white matter measuring 11 mm in diameter. It could represent edema, demyelination or encephalomalacia.               MR-HIP WITH & W/O RIGHT    (Results Pending)   MR-ANKLE-WITH & W/O RIGHT    (Results Pending)   MR-KNEE-WITH & W/O RIGHT    (Results Pending)         Impression & Recommendations:    Warren disease (HD).  New right foot drop. Appears to be isolated weakness of dorsiflexion without clear sensory deficits.   Recurrent falls. Last was 1 week ago injuring the right side (face, hip and foot).  Brisk DTRs with asymmetric Estevez's      Etiology is unclear; however trauma from recent fall is a consideration. No clearly associated sensory symptoms or findings. Does not appear to be dystonia or other disorder of movement. There were no acute intracranial findings identified on MRI brain. No acute findings on MRI of the lumbar spine.     -Follow-up MRI of the right hip.   -Add MRI of the C-spine. Order is in.   -MRI of the right knee and ankle are pending.   -PT/OT.   -AFO.  -Continue home HD medication regimen.   -Will need close outpatient neurology follow-up for an expedited EMG/NCS and clinical re-assessment.     Neurology will follow along.    I provided a total of 35 minutes of acute neurologic care for this patient encounter reviewing medical records, diagnostic studies, direct face-to-face time with the patient and/or family, documentation, communicating and coordinating plan of care.      Dewey Benton MD  Neurohospitalist, Acute Care " Services          Please note that this dictation was created using voice recognition software.  I have made every reasonable attempt to correct obvious errors, but I expect that there are errors of grammar and possibly content that I did not discover before finalizing the note.

## 2025-01-05 NOTE — THERAPY
Physical Therapy Contact Note    Patient Name: Cinda Perrin  Age:  69 y.o., Sex:  female  Medical Record #: [id number]  Today's Date: 1/4/2025 01/04/25 0950   Initial Contact Note    Initial Contact Note Order Received and Verified, Physical Therapy Evaluation in Progress with Full Report to Follow.   Precautions   Precautions Fall Risk   Comments [place]'s   Interdisciplinary Plan of Care Collaboration   Collaboration Comments Chart reviewed. Pt requested to sleep. Will attempt to assist pt at another time.

## 2025-01-05 NOTE — CONSULTS
Physical Medicine and Rehabilitation Consultation          Date of initial consultation: 1/5/2025  Consulting provider: Renetta Combs MD  Reason for consultation: assess for acute inpatient rehab appropriateness  LOS: 2 Day(s)    Chief complaint: RLE weakness    HPI: The patient is a 69 y.o. right hand dominant female with a past medical history of Dodge's disease, hypothyroidism;  who presented on 1/3/2025 11:09 AM with acute onset right lower extremity weakness and numbness, patient also experienced right foot drop.  Of note she was discharged the day before representing, from a hospitalization for weakness and falls found to be due to hyponatremia which was resolved at the time of discharge.  On representation workup was essentially normal aside for some hypertension.  General neurology was consulted for patient's history of Dodge disease.  MRI brain found no acute intracranial findings.  Hospitalization was significant for hypotension treated with IV fluids    The patient currently reports overall doing ok. 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 She uses a 4WW at baseline.     ROS  Pertinent positives are mentioned in the HPI, all others reviewed and are negative.    Social Hx:  [facility] assisted living residence. Gets assistance with showers only. Can request more help. Has a son in [facility] who works full time days.     THERAPY:  Restrictions: Fall risk   PT: Functional mobility   Pending     OT: ADLs  1/4: Max assist toileting, min assist upper and lower body dressing    SLP:   1/4: Soft and bite sized solids with thin liquids.     IMAGING:  MR brain 1/3/2025  1. Age-related cerebral atrophy.  2. Mild periventricular white matter changes consistent with chronic microvascular ischemic gliosis.  3. More prominent patchy focus of increased T2 signal intensity in the left anterior frontal  "periventricular white matter also consistent with chronic microvascular ischemic gliosis.    MR L-spine 1/5/2025  1.  Mild levoscoliosis of the lumbar spine.  2.  Minimal lumbar spondylotic changes at the L4-5 and L5-S1 levels.  3.  No evidence of disc extrusion or significant compression of the thecal sac. Further is no evidence of central canal or neural foraminal stenosis.    PROCEDURES:  None    PMH:  Past Medical History:   Diagnosis Date    Sade's disease (HCC)     Hypothyroidism        PSH:  No past surgical history on file.    FHX:  Non-pertinent to today's issues    Medications:  Current Facility-Administered Medications   Medication Dose    polyethylene glycol/lytes (Miralax) Packet 1 Packet  1 Packet    senna-docusate (Pericolace Or Senokot S) 8.6-50 MG per tablet 1 Tablet  1 Tablet    enoxaparin (Lovenox) inj 40 mg  40 mg    acetaminophen (Tylenol) tablet 650 mg  650 mg    ondansetron (Zofran) syringe/vial injection 4 mg  4 mg    ondansetron (Zofran ODT) dispertab 4 mg  4 mg    aspirin (Asa) chewable tab 81 mg  81 mg    Blood pressure control per admin instructions      labetalol (Normodyne/Trandate) injection 10 mg  10 mg    hydrALAZINE (Apresoline) injection 10 mg  10 mg    NS (Bolus) 0.9 % infusion 500 mL  500 mL    colestipol (Colestid) tablet 1 g  1 g    levothyroxine (Synthroid) tablet 50 mcg  50 mcg    [Held by provider] lisinopril (Prinivil) tablet 10 mg  10 mg    mirtazapine (Remeron) tablet 60 mg  60 mg    potassium chloride ER (Klor-Con) tablet 10 mEq  10 mEq    omeprazole (PriLOSEC) capsule 20 mg  20 mg    Deutetrabenazine TABS 2 Tablet  24 mg       Allergies:  Allergies   Allergen Reactions    Codeine Unspecified     Intake    Oxycodone Rash     Rash          Physical Exam:  Vitals: /78   Pulse 76   Temp 36.3 °C (97.3 °F) (Temporal)   Resp 16   Ht 1.676 m (5' 6\")   Wt 58.4 kg (128 lb 12 oz)   SpO2 94%   Gen: NAD  Head: NC/AT  Eyes/ Nose/ Mouth: PERRLA, moist mucous " membranes  Cardio: RRR, good distal perfusion, warm extremities  Pulm: normal respiratory effort, no cyanosis   Abd: Soft NTND, negative borborygmi   Ext: No peripheral edema. No calf tenderness. No clubbing.    Mental status:  A&Ox4 (person, place, date, situation) answers questions appropriately follows commands  Speech: fluent, no aphasia or dysarthria    Motor:      Upper Extremity  Myotome R L   Shoulder flexion C5 4/5 4/5   Elbow flexion C5 4/5 4/5   Wrist extension C6 4/5 4/5   Elbow extension C7 4/5 4/5   Finger flexion C8 4/5 4/5   Finger abduction T1 4/5 4/5     Lower Extremity Myotome R L   Hip flexion L2 3/5 3/5   Knee extension L3 2/5 2/5   Ankle dorsiflexion L4 0/5 1/5   Toe extension L5 0/5 1/5   Ankle plantarflexion S1 1/5 1/5     Skin: No documented lesions as of 1/5/2025     Labs: Reviewed and significant for   Recent Labs     01/03/25  1120 01/04/25  0022 01/05/25  0613   RBC 4.61 4.30 4.26   HEMOGLOBIN 14.8 13.6 13.4   HEMATOCRIT 42.7 39.4 39.7   PLATELETCT 365 356 327   PROTHROMBTM 12.7  --   --    APTT 24.5*  --   --    INR 0.96  --   --      Recent Labs     01/03/25  1120 01/04/25  0205 01/05/25  0355   SODIUM 136 133* 128*   POTASSIUM 4.6 3.9 4.5   CHLORIDE 98 96 91*   CO2 26 26 24   GLUCOSE 107* 90 150*   BUN 8 9 14   CREATININE 1.10 0.99 1.16   CALCIUM 10.1 9.6 9.6     Recent Results (from the past 24 hours)   Comp Metabolic Panel    Collection Time: 01/05/25  3:55 AM   Result Value Ref Range    Sodium 128 (L) 135 - 145 mmol/L    Potassium 4.5 3.6 - 5.5 mmol/L    Chloride 91 (L) 96 - 112 mmol/L    Co2 24 20 - 33 mmol/L    Anion Gap 13.0 7.0 - 16.0    Glucose 150 (H) 65 - 99 mg/dL    Bun 14 8 - 22 mg/dL    Creatinine 1.16 0.50 - 1.40 mg/dL    Calcium 9.6 8.5 - 10.5 mg/dL    Correct Calcium 9.9 8.5 - 10.5 mg/dL    AST(SGOT) 39 12 - 45 U/L    ALT(SGPT) 36 2 - 50 U/L    Alkaline Phosphatase 180 (H) 30 - 99 U/L    Total Bilirubin 0.4 0.1 - 1.5 mg/dL    Albumin 3.6 3.2 - 4.9 g/dL    Total Protein  6.6 6.0 - 8.2 g/dL    Globulin 3.0 1.9 - 3.5 g/dL    A-G Ratio 1.2 g/dL   ESTIMATED GFR    Collection Time: 01/05/25  3:55 AM   Result Value Ref Range    GFR (CKD-EPI) 51 (A) >60 mL/min/1.73 m 2   CBC WITH DIFFERENTIAL    Collection Time: 01/05/25  6:13 AM   Result Value Ref Range    WBC 11.1 (H) 4.8 - 10.8 K/uL    RBC 4.26 4.20 - 5.40 M/uL    Hemoglobin 13.4 12.0 - 16.0 g/dL    Hematocrit 39.7 37.0 - 47.0 %    MCV 93.2 81.4 - 97.8 fL    MCH 31.5 27.0 - 33.0 pg    MCHC 33.8 32.2 - 35.5 g/dL    RDW 47.6 35.9 - 50.0 fL    Platelet Count 327 164 - 446 K/uL    MPV 9.3 9.0 - 12.9 fL    Neutrophils-Polys 85.40 (H) 44.00 - 72.00 %    Lymphocytes 6.80 (L) 22.00 - 41.00 %    Monocytes 6.40 0.00 - 13.40 %    Eosinophils 0.50 0.00 - 6.90 %    Basophils 0.40 0.00 - 1.80 %    Immature Granulocytes 0.50 0.00 - 0.90 %    Nucleated RBC 0.00 0.00 - 0.20 /100 WBC    Neutrophils (Absolute) 9.45 (H) 1.82 - 7.42 K/uL    Lymphs (Absolute) 0.75 (L) 1.00 - 4.80 K/uL    Monos (Absolute) 0.71 0.00 - 0.85 K/uL    Eos (Absolute) 0.06 0.00 - 0.51 K/uL    Baso (Absolute) 0.04 0.00 - 0.12 K/uL    Immature Granulocytes (abs) 0.05 0.00 - 0.11 K/uL    NRBC (Absolute) 0.00 K/uL         ASSESSMENT:  Patient is a 69 y.o. female admitted with history of Alber[employer]'s disease now with right foot drop    Rehabilitation: Impaired ADLs and mobility  Barriers to transfer include: Insurance authorization, TCCs to verify disposition, medical clearance and bed availability. All cases are subject to administrative review.     IGC Code / Diagnosis to Support: 0003.8 - Neurologic Conditions: Neuromuscular Disorders    Disposition recommendations:  - Good potential candidate for IPR. Will need increased support at [place] half-way.   - TCC to evaluate DC support   -PMR to follow in the periphery for rehab appropriateness, please reach out with questions or request for medical management    Medical Complexity:    RLE weakness   - Case discussed with neurology. Unclear  etiology of right foot drop. Likely traumatic compression injury from recent fall.   - Physical exam strength limited in evaluation due to task impersistence that accompanies HD  - MRI work up per neurology in progress   - MR Brain negative for acute findings. MR L spine negative for acute findings. MR right hip negative for acute findings.   -MR Right knee and ankle, and MR C spine pending.   - EMG recommended, will try to arrange next week.   - Continue PT/OT while in house   - Check B12 in AM   - Check cortisol in AM   - Mag WNL 1/1/25  - Albumin WNL 1/5/25  - Check Pre-albumin in AM   - Working towards an IPR admission     Groton's Disease   - Continue home deutetrabenazine   - Minimal chorea     Hypertension    - Lisinopril 10mg Daily     Hypothyroidism   - Synthroid 50 mcg QAM   -TSH 0.823 (12/30/24)    Hyponatremia   - Sodium 128   - Likely contributing to weakness, primary team to address    DVT PPX: Lovenox       Thank you for allowing us to participate in the care of this patient.     Total time: >80 minutes. This includes time spent reviewing patient's history, notes, labs, imaging, vitals, medications, examining patient, discussing care with patient and family including prognosis, risk reduction, benefits of treatment, and options for next stage of care, and communicating recommendations to primary team.     Abhishek Rothman, DO   Physical Medicine and Rehabilitation     Please note that this dictation was created using voice recognition software. I have made every reasonable attempt to correct obvious errors, but there may be errors of grammar and possibly content that I did not discover before finalizing the note.

## 2025-01-05 NOTE — PROGRESS NOTES
Monitor Summary: SR 86-91, KY 0.14, QRS 0.08, QT 0.38, with PACs per strip from monitor room.

## 2025-01-05 NOTE — THERAPY
"Speech Language Pathology   Clinical Swallow Evaluation     Patient Name: Cinda Perrin  AGE:  69 y.o., SEX:  female  Medical Record #: [id number]  Date of Service: 1/4/2025      History of Present Illness  \"69 y.o. right-handed woman with Sade disease (HD) presenting with acute right leg weakness for whom neurology has been consulted for evaluation. \"      PMHx:   Past Medical History:   Diagnosis Date    [place]'s disease (HCC)     Hypothyroidism     \"Diagnosed with HD at [facility] in 2022. Follows with the [facility]. Last visit was July 2024. HD medications at that time were Effexor 37.5mg daily, Austedo 24mg twice daily and Remeron 60mg nightly. Tapered off Amantadine. At baseline uses a rolling walker. Has experienced falls; last was one week resulting in bruising of the right side (face/head, right hip and right foot). \"        MRI 1/4-\"1. Age-related cerebral atrophy.     2. Mild periventricular white matter changes consistent with chronic microvascular ischemic gliosis.     3. More prominent patchy focus of increased T2 signal intensity in the left anterior frontal periventricular white matter also consistent with chronic microvascular ischemic gliosis.\"    CXR 1/4-\"No acute cardiac or pulmonary abnormalities are identified.\"    General Information:  Vitals  O2 Delivery Device: None - Room Air  Level of Consciousness: Alert, Awake        Follows Directives: Yes      Prior Living Situation & Level of Function:  Prior Services: Intermittent Physical Support for ADL Per Service  Housing / Facility: Assisted Living Residence ([place])  Steps Into Home: 0  Steps In Home: 0  Elevator: Yes  Bathroom Set up: Walk In Shower, Grab Bars, Shower Chair  Equipment Owned: 4-Wheel Walker, Grab Bar(s) In Tub / Shower, Tub / Shower Seat, Grab Bar(s) By Toilet  Lives with - Patient's Self Care Capacity: Attendant / Paid Care Giver  Comments: Pt reports she receives assist only for " showering. Cannot receive assistance for walking per pt     Communication: Dysarthria baseline with [place]'s disease, but no other issues per patient.  Swallowing: Patient reports difficulty with meat.       Oral Mechanism Evaluation:  Dentition: Good, Natural dentition   Facial Symmetry: Equal  Facial Sensation: Equal     Labial Observations: WFL   Lingual Observations: Midline  Motor Speech: mild dysarthria 2/2 dean's            Laryngeal Function:  Secretion Management: Adequate  Voice Quality: Strain        Cough: Perceptually WNL         Subjective  Rn cleared without restrictions. Patient agreeable to evalaution.  She endorses difficulty chewing meat, but states it is better with soft bite size, though she doesn't always like it.      Assessment  Current Method of Nutrition: Oral diet (Soft Bite sized/Thin)  Positioning: Vines's (60-90 degrees)  Bolus Administration: Patient    O2 Delivery Device: None - Room Air  Factor(s) Affecting Performance: None  Tracheostomy : No        Swallowing Trials:  Swallowing Trials  Ice: WFL  Thin Liquid (TN0): WFL  Pureed (PU4): WFL  Soft & Bite Sized (SB6): WFL      Comments: Patient tolerated thin liquids by straw including consecutive swallows without overt s/s of aspiration.  Patient tolerated up to soft and bite sized without overt s/s of aspiration.  She asked for one small piece of cracker and did well, but does prefer soft bite size for the meat.  Vitals remained stable throughout.        Clinical Impressions  Patient prefers soft and bite size for the meat due to length of time masticating and difficulty with dry/chewy meat.  Patient tolerated up to soft bite size with thin liquids without overt s/s of aspiration and with adequate oral clearance and suspected timely swallow. No further skilled dysphagia tx is indicated at this time.     Recommendations  Diet Consistency: Soft bite sized/Thin liquids  Instrumentation: None indicated at this time  Medication:  As tolerated  Supervision: Independent, Distant supervision - check on patient 2-3 times per meal  Positioning: Fully upright and midline during oral intake  Risk Management : Small bites/sips, Alternate bites and sips, Slow rate of intake  Oral Care: BID         SLP Treatment Plan  Treatment Plan: None Indicated  SLP Frequency: N/A - Evaluation Only  Estimated Duration: N/A - Evaluation Only      Anticipated Discharge Needs  Discharge Recommendations: Anticipate that the patient will have no further speech therapy needs after discharge from the hospital   Therapy Recommendations Upon DC: Not Indicated        Melida Mohamud, SLP

## 2025-01-05 NOTE — CARE PLAN
The patient is Stable - Low risk of patient condition declining or worsening    Shift Goals  Clinical Goals: skin integrity, stable neuro  Patient Goals: sleep  Family Goals: tamika    Progress made toward(s) clinical / shift goals:  Q4 neuro checks. No neuro changes. Q2 turns to prevent skin breakdown. Purwick in place to prevent moisture. Patient eating/drinking with no signs of dysphagia.     Problem: Neuro Status  Goal: Neuro status will remain stable or improve  Outcome: Progressing     Problem: Dysphagia  Goal: Dysphagia will improve  Outcome: Progressing     Problem: Skin Integrity  Goal: Skin integrity is maintained or improved  Outcome: Progressing       Patient is not progressing towards the following goals:

## 2025-01-05 NOTE — THERAPY
"Speech Language Pathology   Daily Treatment     Patient Name: Cinda Perrin  AGE:  69 y.o., SEX:  female  Medical Record #: [id number]  Date of Service: 1/5/2025      Precautions:  Precautions: Fall Risk, Swallow Precautions         Subjective  Rn reports that patient is only drinking orange juice and not eating.  Patient asked for reassessment to eat pizza on an increased diet, though she endorses a hx of difficulty with meat and \"choking\".        Assessment  Patient was seen for reassessment with increased diet and was agreeable this date to consuming a regular trial.  Patient tolerated a dry sandwich with adequate mastication, though prolonged without overt s/s of aspiration.  Patient does have extraneous movements of head/neck due to Proctor's which does increase her risk of aspiration, but with 1:1 for safety precautions, patient will likely do well.  She may be able to decrease to close supervision with tray set up by staff if safely tolerating and eating slowly without overt s/s of aspiration.        Clinical Impressions  Recommend advancing to Regular/Thin with 1:1 for safety precautions.  Patient tolerated regular trial with thin liquids with prolonged mastication and no overt s/s of aspiration.  Will continue to follow for safe tolerance of advanced diet.       Recommendations  Treatment Completed: Dysphagia Treatment  Consult Referral(s): Dietician    Dysphagia Treatment  Diet Consistency: Regular/thin  Instrumentation: None indicated at this time  Medication: As tolerated  Supervision: 1:1 feeding with constant supervision (for use of safe swallow precautions with increased diet.)  Positioning: Fully upright and midline during oral intake, Meals sitting upright in a chair, as tolerated  Risk Management : Small bites/sips, Alternate bites and sips, Slow rate of intake  Oral Care: BID         Cognitive Treatment  Supervision Needs Upons Discharge: Intermittent assistance with IADLs (see below)  IADLs: " "Medication management, Financial management           SLP Treatment Plan  Treatment Plan: Dysphagia Treatment, Patient/Family/Caregiver Training  SLP Frequency: 2x Per Week  Estimated Duration: Until Therapy Goals Met      Anticipated Discharge Needs  Discharge Recommendations: Anticipate that the patient will have no further speech therapy needs after discharge from the hospital  Therapy Recommendations Upon DC: Dysphagia Training, Patient / Family / Caregiver Education      Patient / Family Goals  Patient / Family Goal #1: \"I want pizza  Short Term Goals  Short Term Goal # 1: Patient will consume a Regular diet with thin liquids without overt s/s of aspiration.      Melida Mohamud, SLP  "

## 2025-01-05 NOTE — PROGRESS NOTES
NOC HOSPITALIST CROSS COVER    Notified by RN regarding hypotension of 86/44.  Patient has been previously hypertensive this admission, with blood pressures more consistently in the 130s to 160s.  The patient's lisinopril had been restarted yesterday a.m., but they have not received any IV antihypertensives.      Vitals:    01/05/25 0330   BP: (!) 86/44   Pulse: 80   Resp: 16   Temp: 36.3 °C (97.3 °F)   SpO2: 93%      Plan:  # Hypotension  -500 cc LR bolus  -Recheck blood pressure post bolus  -Increased hold parameters on lisinopril to hold for SBP < 110  -Will check a CBC with differential    -----------------------------------------------------------------------------------------------------------    Electronically signed by:  Selena Valdez, BETH, APRN, EHNRYP-BC  Hospitalist Services

## 2025-01-05 NOTE — PROGRESS NOTES
Medium Prafo boot placed on pt RLE. Pt tolerating boot well at this time.    Contact traction for questions or concerns regarding this dme

## 2025-01-05 NOTE — CARE PLAN
The patient is Stable - Low risk of patient condition declining or worsening    Shift Goals  Clinical Goals: safety, maintain skin integrity, complete MRIs  Patient Goals: rest, be comfortable  Family Goals: n/a    Progress made toward(s) clinical / shift goals:      Problem: Neuro Status  Goal: Neuro status will remain stable or improve  Outcome: Progressing  Note: Q4h neuro checks in place. Pt intermittently able to dorsiflex on the right.      Problem: Skin Integrity  Goal: Skin integrity is maintained or improved  Outcome: Progressing  Note: Q2h turns, GAIL, mepliex, TAPs and pillows in place for prevention measures.      Problem: Knowledge Deficit - Standard  Goal: Patient and family/care givers will demonstrate understanding of plan of care, disease process/condition, diagnostic tests and medications  Outcome: Progressing  Note: Pt updated on POC for the day. Plan to maintain pt comfort and for remaining MRIs this evening.        Patient is not progressing towards the following goals: n/a

## 2025-01-05 NOTE — PROGRESS NOTES
[facility] Medicine Daily Progress Note    Date of Service  1/5/2025    Chief Complaint  Cinda Perrin is a 69 y.o. female admitted 1/3/2025 with right sided weakness    Hospital Course  Cinda Perrin is a 69 y.o. female with past medical history of [place]'s disease and hypothyroidism. She presented to [facility] on 1/3/2025 with acute onset right lower extremity weakness and numbness.  Of note patient was hospitalized on 12/30 and discharged 1/2/25 after treatment for weakness and falls where she was found to have hyponatremia which had resolved at time of discharge.  Patient reported she went home and suddenly started having right lower extremity weakness with numbness. She also reported foot drop and difficulty ambulating with her walker as she felt like her foot was dragging across the floor.  She denied any right upper extremity weakness or numbness, dysarthria, or dysphagia.  Patient reported her weakness did not resolve and thus came back to the ER for evaluation.  She denied any falls post discharge, new onset back pain, fevers, chills, chest pain or shortness of breath.     The ER vital signs significant for blood pressure 174/84.  Labs significant for creatinine 1.1, GFR 54, AST 81, ALT 56, alkaline phosphatase 199.  Troponin negative.  CT head workup in ER within normal limits.  Chest x-ray reviewed showed no acute findings.   MRI: More prominent patchy focus of increased T2 signal intensity in the left anterior frontal periventricular white matter also consistent with chronic microvascular ischemic gliosis.     Interval Problem Update  Axox3, some hypotension overnight, now resolved, she did get a 500 cc bolus of fluids. I have now held her lisinopril. She denies focal right sided weakness aside from persistent R foot drop. Denies numbness or tingling. Exam and vitals currently stable. MRI pending. ROS otherwise negative    I have discussed this patient's plan of care and discharge plan at [facility]  rounds today with Case Management, Nursing, Nursing leadership, and other members of the IDT team.    Consultants/Specialty  Neurology [facility]    Code Status  DNAR/DNI    Disposition  The patient is not medically cleared for discharge to home or a post-acute facility.      I have placed the appropriate orders for post-discharge needs.    Review of Systems  Review of Systems   Constitutional: Negative.  Negative for chills, diaphoresis, fever, malaise/fatigue and weight loss.   HENT: Negative.  Negative for sore throat.    Eyes: Negative.  Negative for blurred vision.   Respiratory: Negative.  Negative for cough and shortness of breath.    Cardiovascular: Negative.  Negative for chest pain, palpitations and leg swelling.   Gastrointestinal: Negative.  Negative for abdominal pain, nausea and vomiting.   Genitourinary: Negative.  Negative for dysuria.   Musculoskeletal: Negative.  Negative for myalgias.   Skin: Negative.  Negative for itching and rash.   Neurological:  Positive for weakness. Negative for dizziness, focal weakness and headaches.   Endo/Heme/Allergies: Negative.  Does not bruise/bleed easily.   Psychiatric/Behavioral: Negative.  Negative for depression, substance abuse and suicidal ideas.    All other systems reviewed and are negative.       Physical Exam  Temp:  [36.3 °C (97.3 °F)-37 °C (98.6 °F)] 36.3 °C (97.3 °F)  Pulse:  [75-87] 76  Resp:  [16-17] 16  BP: ()/(44-89) 103/78  SpO2:  [93 %-96 %] 94 %    Physical Exam  Vitals and nursing note reviewed. Exam conducted with a chaperone present.   Constitutional:       General: She is not in acute distress.     Appearance: Normal appearance. She is not diaphoretic.   HENT:      Head: Normocephalic.      Nose: Nose normal.      Mouth/Throat:      Mouth: Mucous membranes are moist.   Eyes:      Pupils: Pupils are equal, round, and reactive to light.   Cardiovascular:      Rate and Rhythm: Normal rate and regular rhythm.      Pulses: Normal pulses.       Heart sounds: Normal heart sounds.   Pulmonary:      Effort: Pulmonary effort is normal.      Breath sounds: Normal breath sounds.   Abdominal:      General: Abdomen is flat. Bowel sounds are normal.      Palpations: Abdomen is soft.   Musculoskeletal:         General: No swelling or deformity. Normal range of motion.   Skin:     General: Skin is warm and dry.      Capillary Refill: Capillary refill takes less than 2 seconds.   Neurological:      Mental Status: She is alert and oriented to person, place, and time.      Cranial Nerves: No cranial nerve deficit.      Comments: Bradykinesia   R foot drop   Psychiatric:         Mood and Affect: Mood normal.         Behavior: Behavior normal.         Fluids    Intake/Output Summary (Last 24 hours) at 1/5/2025 1025  Last data filed at 1/5/2025 1006  Gross per 24 hour   Intake 80 ml   Output 2450 ml   Net -2370 ml        Laboratory  Recent Labs     01/03/25  1120 01/04/25  0022 01/05/25  0613   WBC 5.4 7.5 11.1*   RBC 4.61 4.30 4.26   HEMOGLOBIN 14.8 13.6 13.4   HEMATOCRIT 42.7 39.4 39.7   MCV 92.6 91.6 93.2   MCH 32.1 31.6 31.5   MCHC 34.7 34.5 33.8   RDW 47.9 48.1 47.6   PLATELETCT 365 356 327   MPV 9.3 9.7 9.3     Recent Labs     01/03/25  1120 01/04/25  0205 01/05/25  0355   SODIUM 136 133* 128*   POTASSIUM 4.6 3.9 4.5   CHLORIDE 98 96 91*   CO2 26 26 24   GLUCOSE 107* 90 150*   BUN 8 9 14   CREATININE 1.10 0.99 1.16   CALCIUM 10.1 9.6 9.6     Recent Labs     01/03/25  1120   APTT 24.5*   INR 0.96         Recent Labs     01/04/25  0205   TRIGLYCERIDE 141   HDL 57   LDL 68       Imaging  MR-LUMBAR SPINE-WITH & W/O   Final Result         1.  Mild levoscoliosis of the lumbar spine.      2.  Minimal lumbar spondylotic changes at the L4-5 and L5-S1 levels.      3.  No evidence of disc extrusion or significant compression of the thecal sac. Further is no evidence of central canal or neural foraminal stenosis.      MR-BRAIN-W/O   Final Result         1. Age-related cerebral  "atrophy.      2. Mild periventricular white matter changes consistent with chronic microvascular ischemic gliosis.      3. More prominent patchy focus of increased T2 signal intensity in the left anterior frontal periventricular white matter also consistent with chronic microvascular ischemic gliosis.      DX-CHEST-PORTABLE (1 VIEW)   Final Result      No acute cardiac or pulmonary abnormalities are identified.      CT-CTA NECK WITH & W/O-POST PROCESSING   Final Result      No occlusion or hemodynamically significant stenosis of the neck arteries.      CT-CTA HEAD WITH & W/O-POST PROCESS   Final Result      No large vessel occlusion, hemodynamically significant stenosis or aneurysm.      CT-CEREBRAL PERFUSION ANALYSIS   Final Result      1. Cerebral blood flow less than 30% possibly representing completed infarct = 0 mL. Based on distribution of this finding, this is unlikely to represent artifact.      2. T Max more than 6 seconds possibly representing combination of completed infarct and ischemia = 0 mL. Based on the distribution of this finding, this is unlikely to represent artifact.      3. Mismatched volume possibly representing ischemic brain/penumbra= 0 mL      4.  Please note that this cerebral perfusion study and report is Quantitative and targets supratentorial (cerebral) perfusion for evaluation of large vessel territory acute ischemia/infarction. For example, lacunar infarcts, and brainstem/posterior fossa    ischemia/infarction are not evaluated on this study.  Data acquisition is subject to artifacts which can yield non-anatomically plausible perfusion maps which may be due to motion, bolus timing, signal to noise ratio, or other technical factors.    Perfusion map abnormalities which show non-anatomic distributions are likely artifact.   This study is not \"stand-alone\" and should only be utilized for diagnosis, management/treatment in correlation with CT, CTA, and/or MRI and clinical factors.       "   CT-HEAD W/O   Final Result      1. No acute intracranial hemorrhage.   2. Atrophy and diffuse chronic microangiopathic white matter changes versus demyelination gliosis.   3. Hypodensity in the left frontal lobe periventricular white matter measuring 11 mm in diameter. It could represent edema, demyelination or encephalomalacia.               MR-HIP WITH & W/O RIGHT    (Results Pending)   MR-ANKLE-WITH & W/O RIGHT    (Results Pending)   MR-KNEE-WITH & W/O RIGHT    (Results Pending)        Assessment/Plan  * Acute weakness- (present on admission)  Assessment & Plan  Acute right lower extremity weakness with foot drop and numbness/tingling, no further numbness or tingling  No evidence of stroke on MRI  I discussed with neurology who recommends outpatient emg  CTA workup in ER negative for large vessel occlusion  Symptoms started yesterday, outside window for tPA  PT, OT  Post acute placement pending    Hypotension  Assessment & Plan  Likely due to lisinopril and dehydration, now resolved ,holding lisinopril  following    Leukocytosis  Assessment & Plan  Mild  No clinical signs of infection currently  Cbc in am   following    Dysphagia  Assessment & Plan  Continue speech therapy  Follow up with GI as outpatient per speech recommendation    Transaminitis- (present on admission)  Assessment & Plan  Appears chronic  Following  Alk phos improving  Cmp in am     GERD without esophagitis- (present on admission)  Assessment & Plan  Continue PPI    Benign hypertension with CKD (chronic kidney disease) stage III- (present on admission)  Assessment & Plan  Continue lisinopril  As needed antihypertensives, no need for permissive hypertension    Hyponatremia- (present on admission)  Assessment & Plan  Mild  Following   Na in am    Insomnia due to medical condition- (present on admission)  Assessment & Plan  Continue Remeron    Acquired hypothyroidism- (present on admission)  Assessment & Plan  Continue home  levothyroxine    Mixed dyslipidemia- (present on admission)  Assessment & Plan  Continue colestipol  Patient with mild AST/ALT elevation    [place]'s disease (HCC)- (present on admission)  Assessment & Plan  Continue home medications deutetrabenazine         VTE prophylaxis:   Lovenox    I have performed a physical exam and reviewed and updated ROS and Plan today (1/5/2025). In review of yesterday's note (1/4/2025), there are no changes except as documented above.

## 2025-01-06 ENCOUNTER — APPOINTMENT (OUTPATIENT)
Dept: RADIOLOGY | Facility: MEDICAL CENTER | Age: 70
DRG: 565 | End: 2025-01-06
Attending: HOSPITALIST
Payer: MEDICARE

## 2025-01-06 PROBLEM — M21.379 FOOT DROP: Status: ACTIVE | Noted: 2025-01-06

## 2025-01-06 LAB
ALBUMIN SERPL BCP-MCNC: 3.3 G/DL (ref 3.2–4.9)
ALBUMIN/GLOB SERPL: 1.2 G/DL
ALP SERPL-CCNC: 158 U/L (ref 30–99)
ALT SERPL-CCNC: 34 U/L (ref 2–50)
ANION GAP SERPL CALC-SCNC: 11 MMOL/L (ref 7–16)
AST SERPL-CCNC: 42 U/L (ref 12–45)
BASOPHILS # BLD AUTO: 0.7 % (ref 0–1.8)
BASOPHILS # BLD: 0.05 K/UL (ref 0–0.12)
BILIRUB SERPL-MCNC: 0.3 MG/DL (ref 0.1–1.5)
BUN SERPL-MCNC: 14 MG/DL (ref 8–22)
CALCIUM ALBUM COR SERPL-MCNC: 9.9 MG/DL (ref 8.5–10.5)
CALCIUM SERPL-MCNC: 9.3 MG/DL (ref 8.5–10.5)
CHLORIDE SERPL-SCNC: 91 MMOL/L (ref 96–112)
CO2 SERPL-SCNC: 25 MMOL/L (ref 20–33)
CORTIS SERPL-MCNC: 2.4 UG/DL (ref 0–23)
CREAT SERPL-MCNC: 1.03 MG/DL (ref 0.5–1.4)
EOSINOPHIL # BLD AUTO: 0.21 K/UL (ref 0–0.51)
EOSINOPHIL NFR BLD: 2.8 % (ref 0–6.9)
ERYTHROCYTE [DISTWIDTH] IN BLOOD BY AUTOMATED COUNT: 47.2 FL (ref 35.9–50)
GFR SERPLBLD CREATININE-BSD FMLA CKD-EPI: 59 ML/MIN/1.73 M 2
GLOBULIN SER CALC-MCNC: 2.7 G/DL (ref 1.9–3.5)
GLUCOSE SERPL-MCNC: 92 MG/DL (ref 65–99)
HCT VFR BLD AUTO: 39.1 % (ref 37–47)
HGB BLD-MCNC: 13.2 G/DL (ref 12–16)
IMM GRANULOCYTES # BLD AUTO: 0.05 K/UL (ref 0–0.11)
IMM GRANULOCYTES NFR BLD AUTO: 0.7 % (ref 0–0.9)
LYMPHOCYTES # BLD AUTO: 2 K/UL (ref 1–4.8)
LYMPHOCYTES NFR BLD: 26.6 % (ref 22–41)
MCH RBC QN AUTO: 31.4 PG (ref 27–33)
MCHC RBC AUTO-ENTMCNC: 33.8 G/DL (ref 32.2–35.5)
MCV RBC AUTO: 93.1 FL (ref 81.4–97.8)
MONOCYTES # BLD AUTO: 0.72 K/UL (ref 0–0.85)
MONOCYTES NFR BLD AUTO: 9.6 % (ref 0–13.4)
NEUTROPHILS # BLD AUTO: 4.49 K/UL (ref 1.82–7.42)
NEUTROPHILS NFR BLD: 59.6 % (ref 44–72)
NRBC # BLD AUTO: 0 K/UL
NRBC BLD-RTO: 0 /100 WBC (ref 0–0.2)
PLATELET # BLD AUTO: 341 K/UL (ref 164–446)
PMV BLD AUTO: 9.5 FL (ref 9–12.9)
POTASSIUM SERPL-SCNC: 4.5 MMOL/L (ref 3.6–5.5)
PREALB SERPL-MCNC: 17.1 MG/DL (ref 18–38)
PROT SERPL-MCNC: 6 G/DL (ref 6–8.2)
RBC # BLD AUTO: 4.2 M/UL (ref 4.2–5.4)
SODIUM SERPL-SCNC: 127 MMOL/L (ref 135–145)
VIT B12 SERPL-MCNC: 276 PG/ML (ref 211–911)
WBC # BLD AUTO: 7.5 K/UL (ref 4.8–10.8)

## 2025-01-06 PROCEDURE — 97162 PT EVAL MOD COMPLEX 30 MIN: CPT

## 2025-01-06 PROCEDURE — 700102 HCHG RX REV CODE 250 W/ 637 OVERRIDE(OP): Performed by: HOSPITALIST

## 2025-01-06 PROCEDURE — 72156 MRI NECK SPINE W/O & W/DYE: CPT

## 2025-01-06 PROCEDURE — 82533 TOTAL CORTISOL: CPT

## 2025-01-06 PROCEDURE — 84134 ASSAY OF PREALBUMIN: CPT

## 2025-01-06 PROCEDURE — A9579 GAD-BASE MR CONTRAST NOS,1ML: HCPCS | Mod: JZ | Performed by: HOSPITALIST

## 2025-01-06 PROCEDURE — 36415 COLL VENOUS BLD VENIPUNCTURE: CPT

## 2025-01-06 PROCEDURE — 73723 MRI JOINT LWR EXTR W/O&W/DYE: CPT | Mod: RT

## 2025-01-06 PROCEDURE — 99232 SBSQ HOSP IP/OBS MODERATE 35: CPT | Performed by: HOSPITALIST

## 2025-01-06 PROCEDURE — 700102 HCHG RX REV CODE 250 W/ 637 OVERRIDE(OP): Performed by: INTERNAL MEDICINE

## 2025-01-06 PROCEDURE — 85025 COMPLETE CBC W/AUTO DIFF WBC: CPT

## 2025-01-06 PROCEDURE — 770020 HCHG ROOM/CARE - TELE (206)

## 2025-01-06 PROCEDURE — A9270 NON-COVERED ITEM OR SERVICE: HCPCS | Performed by: INTERNAL MEDICINE

## 2025-01-06 PROCEDURE — 700111 HCHG RX REV CODE 636 W/ 250 OVERRIDE (IP): Mod: JZ | Performed by: NURSE PRACTITIONER

## 2025-01-06 PROCEDURE — A9270 NON-COVERED ITEM OR SERVICE: HCPCS | Performed by: HOSPITALIST

## 2025-01-06 PROCEDURE — 80053 COMPREHEN METABOLIC PANEL: CPT

## 2025-01-06 PROCEDURE — 82607 VITAMIN B-12: CPT

## 2025-01-06 PROCEDURE — 97530 THERAPEUTIC ACTIVITIES: CPT

## 2025-01-06 PROCEDURE — 700111 HCHG RX REV CODE 636 W/ 250 OVERRIDE (IP): Performed by: HOSPITALIST

## 2025-01-06 PROCEDURE — 700111 HCHG RX REV CODE 636 W/ 250 OVERRIDE (IP): Mod: JZ | Performed by: INTERNAL MEDICINE

## 2025-01-06 PROCEDURE — 700117 HCHG RX CONTRAST REV CODE 255: Mod: JZ | Performed by: HOSPITALIST

## 2025-01-06 RX ORDER — DIAZEPAM 10 MG/2ML
5 INJECTION, SOLUTION INTRAMUSCULAR; INTRAVENOUS ONCE
Status: COMPLETED | OUTPATIENT
Start: 2025-01-06 | End: 2025-01-06

## 2025-01-06 RX ADMIN — DIAZEPAM 5 MG: 5 INJECTION, SOLUTION INTRAMUSCULAR; INTRAVENOUS at 16:55

## 2025-01-06 RX ADMIN — DEUTETRABENAZINE 2 TABLET: 12 TABLET, COATED ORAL at 08:47

## 2025-01-06 RX ADMIN — DIAZEPAM 5 MG: 10 INJECTION, SOLUTION INTRAMUSCULAR; INTRAVENOUS at 18:45

## 2025-01-06 RX ADMIN — MIRTAZAPINE 60 MG: 15 TABLET, FILM COATED ORAL at 16:47

## 2025-01-06 RX ADMIN — OMEPRAZOLE 20 MG: 20 CAPSULE, DELAYED RELEASE ORAL at 05:50

## 2025-01-06 RX ADMIN — DEUTETRABENAZINE 2 TABLET: 12 TABLET, COATED ORAL at 20:54

## 2025-01-06 RX ADMIN — ASPIRIN 81 MG: 81 TABLET, CHEWABLE ORAL at 05:50

## 2025-01-06 RX ADMIN — SENNOSIDES AND DOCUSATE SODIUM 1 TABLET: 50; 8.6 TABLET ORAL at 05:50

## 2025-01-06 RX ADMIN — GADOTERIDOL 10 ML: 279.3 INJECTION, SOLUTION INTRAVENOUS at 20:08

## 2025-01-06 RX ADMIN — POTASSIUM CHLORIDE 10 MEQ: 750 TABLET, EXTENDED RELEASE ORAL at 05:50

## 2025-01-06 RX ADMIN — LEVOTHYROXINE SODIUM 50 MCG: 50 TABLET ORAL at 05:49

## 2025-01-06 RX ADMIN — COLESTIPOL HYDROCHLORIDE 1 G: 1 TABLET, FILM COATED ORAL at 20:53

## 2025-01-06 ASSESSMENT — COGNITIVE AND FUNCTIONAL STATUS - GENERAL
TURNING FROM BACK TO SIDE WHILE IN FLAT BAD: A LITTLE
SUGGESTED CMS G CODE MODIFIER MOBILITY: CK
WALKING IN HOSPITAL ROOM: A LITTLE
MOVING FROM LYING ON BACK TO SITTING ON SIDE OF FLAT BED: A LITTLE
CLIMB 3 TO 5 STEPS WITH RAILING: A LOT
STANDING UP FROM CHAIR USING ARMS: A LITTLE
MOVING TO AND FROM BED TO CHAIR: A LITTLE
MOBILITY SCORE: 17

## 2025-01-06 ASSESSMENT — ENCOUNTER SYMPTOMS
WEAKNESS: 1
CONSTITUTIONAL NEGATIVE: 1
CARDIOVASCULAR NEGATIVE: 1
DIZZINESS: 0
DEPRESSION: 0
WEIGHT LOSS: 0
BRUISES/BLEEDS EASILY: 0
EYES NEGATIVE: 1
BLURRED VISION: 0
GASTROINTESTINAL NEGATIVE: 1
DIAPHORESIS: 0
FOCAL WEAKNESS: 0
SHORTNESS OF BREATH: 0
PALPITATIONS: 0
HEADACHES: 0
NAUSEA: 0
COUGH: 0
ABDOMINAL PAIN: 0
SORE THROAT: 0
CHILLS: 0
PSYCHIATRIC NEGATIVE: 1
FEVER: 0
MYALGIAS: 0
MUSCULOSKELETAL NEGATIVE: 1
RESPIRATORY NEGATIVE: 1
VOMITING: 0

## 2025-01-06 ASSESSMENT — GAIT ASSESSMENTS
ASSISTIVE DEVICE: FRONT WHEEL WALKER
DISTANCE (FEET): 35
GAIT LEVEL OF ASSIST: MINIMAL ASSIST
DEVIATION: ATAXIC;DECREASED BASE OF SUPPORT;DECREASED HEEL STRIKE;DECREASED TOE OFF;OTHER (COMMENT)

## 2025-01-06 ASSESSMENT — FIBROSIS 4 INDEX: FIB4 SCORE: 1.46

## 2025-01-06 ASSESSMENT — PAIN DESCRIPTION - PAIN TYPE: TYPE: ACUTE PAIN

## 2025-01-06 ASSESSMENT — LIFESTYLE VARIABLES: SUBSTANCE_ABUSE: 0

## 2025-01-06 NOTE — CARE PLAN
The patient is Stable - Low risk of patient condition declining or worsening    Shift Goals  Clinical Goals: safety, skin integrity, stable neuro assessments, MRI  Patient Goals: rest  Family Goals: updates    Patient is progressing towards the following goals:    Problem: Optimal Care of the Stroke Patient  Goal: Optimal acute care for the stroke patient  Description: Target End Date:  1 to 3 days    - Vital signs and neuro checks performed and documented per order  - NIHSS completed and documented per order  - Continuous telemetry monitoring for 72 hours or until discontinued by provider  - Head CT without contrast obtained  - Consideration of MRI/MRA  - MRI screening form completed in worklist if MRI ordered  - Echocardiogram with Bubble Study ordered/completed with consideration of CORDELIA  - Carotid Doppler ordered/completed (Not required if CTA of neck completed in ED)  - Lipid Panel obtained within 48 hours of admission  - PT, PTT, INR obtained per Anticoagulation orders (if applicable)  - Antithrombotic therapy by end of hospital day 2 for ischemic stroke. Provider must document reason if contraindicated.  - Venous Thromboembolism (VTE) Prophylaxis by end of hospital day 2 for ischemic and hemorrhagic stroke. Provider must document reason if contraindicated  - Dysphagia screen completed and documented prior to any PO intake. Patient to remain NPO until Speech Therapy evaluation if thrombolytic or thrombectomy performed  - Rehabilitation assessment including PT/OT/SLP evaluations for referral to Physical Medicine and Rehabilitation services. If none needed, provider needs to document reason  - Neurology consult placed  - Consideration of cardiology consult for cryptogenic strokes  Outcome: Progressing     Problem: Knowledge Deficit - Stroke Education  Goal: Patient's knowledge of stroke and risk factors will improve  Description: Target End Date:  1-3 days or as soon as patient condition allows    Document in  Patient Education    1.  Stroke education booklet provided  2.  Education regarding EMS activation, need for follow up, medication prescribed at discharge, risk factors for stroke/lifestyle modifications, warning signs and symptoms of stroke provided  Outcome: Progressing     Problem: Urinary Elimination  Goal: Establish and maintain regular urinary output  Description: Target End Date:  Prior to discharge or change in level of care    Document on I/O and Assessment flowsheets    1.  Evaluate need to continue indwelling catheter every shift  2.  Assess signs and symptoms of urinary retention  3.  Assess post-void residual volumes  4.  Implement bladder training program  5.  Encourage scheduled voidings  6.  Assist patient to sit on bedside commode or toilet for voiding  7.  Educate patient and family/caregiver on use and purpose of urine collection devices (document in Patient Education)  Outcome: Progressing     Problem: Self Care  Goal: Patient will have the ability to perform ADLs independently or with assistance (bathe, groom, dress, toilet and feed)  Description: Target End Date:  Prior to discharge or change in level of care    Document on ADL flowsheet    1.  Assess the capability and level of deficiency to perform ADLs  2.  Encourage family/care giver involvement  3.  Provide assistive devices  4.  Consider PT/OT evaluations  5.  Maintain support, give positive feedback, encourage self-care allowing extra time and verbal cuing as needed  6.  Avoid doing something for patients they can do themselves, but provide assistance as needed  7.  Assist in anticipating/planning individual needs  8.  Collaborate with Case Management and  to meet discharge needs  Outcome: Progressing

## 2025-01-06 NOTE — THERAPY
Physical Therapy   Initial Evaluation     Patient Name: Cinda Perrin  Age:  69 y.o., Sex:  female  Medical Record #: [id number]  Today's Date: 1/6/2025     Precautions  Precautions: Fall Risk;Other (See Comments)  Comments: [place]'s disease; R foot drop; R PRAFO (+)    Assessment  Patient is 69 y.o. female who presented on 1/3/2025 with acute onset right lower extremity weakness and numbness.     Currently been managed for acute weakness, hypotension, leukocytosis, dysphagia, transaminitis, hyponatremia    PMH: [place]'s disease, hypothyroidism, GERD, CKD, insomnia, DLP  Recent DC 1/2/2025     Patient seen for PT evaluation and treatment. Patient in bed, agreeable for the session. Able to demonstrate functional mobility tasks as detailed below. Currently appears to be below baseline level of functional mobility. Will continue to benefit from PT services to help improve overall functional mobility. Recommend post-acute placement at this time.     Plan    Physical Therapy Initial Treatment Plan   Treatment Plan : Bed Mobility, Equipment, Family / Caregiver Training, Gait Training, Neuro Re-Education / Balance, Therapeutic Activities, Therapeutic Exercise  Treatment Frequency: 4 Times per Week  Duration: Until Therapy Goals Met    DC Equipment Recommendations: Unable to determine at this time  Discharge Recommendations: Recommend post-acute placement for additional physical therapy services prior to discharge home    Objective     01/06/25 1333   Time In/Time Out   Therapy Start Time 1258   Therapy End Time 1333   Total Therapy Time 35   Initial Contact Note    Initial Contact Note Order Received and Verified, Physical Therapy Evaluation in Progress with Full Report to Follow.   Precautions   Precautions Fall Risk;Other (See Comments)   Comments [place]'s disease; R foot drop; R PRAFO (+)   Vitals   Pulse 97   Patient BP Position Sitting  (In the chair, post transfer)   Blood Pressure  129/86   Pulse  Oximetry 96 %   O2 Delivery Device None - Room Air   Vitals Comments Post activity, seated in the chair: /93,    Pain   Pain Scales 0 to 10 Scale    Intervention Declines   Prior Living Situation   Prior Services Intermittent Physical Support for ADL Per Service   Housing / Facility Assisted Living Residence  ([place])   Steps Into Home 0   Steps In Home 0   Elevator Yes   Equipment Owned 4-Wheel Walker   Lives with - Patient's Self Care Capacity Attendant / Paid Care Giver   Comments Patient mentioned that [facility] staff assist her with IADLs, she was not needing any assistance with mobility.   Prior Level of Functional Mobility   Bed Mobility Independent   Transfer Status Independent   Ambulation Independent   Ambulation Distance Household   Assistive Devices Used 4-Wheel Walker   History of Falls   History of Falls Yes   Date of Last Fall   (H/O 2 falls in 2024, both of them associated with loss of balance during walking)   Cognition    Cognition / Consciousness X   Level of Consciousness Alert   Safety Awareness Impaired;Impulsive   Passive ROM Lower Body   Passive ROM Lower Body WDL   Active ROM Lower Body    Active ROM Lower Body  X   Comments RLE-absent dorsiflexion, 5 degree PF, knee extension about 70 degree, knee flexion WFL, hip flexion WFL; LLE-ankle DF/PF-WFL, knee extension-about 20 degree, knee flexion WFL, hip flexion WFL   Strength Lower Body   Lower Body Strength  X   Comments Grossly B/L hip flexor 3+/5, R knee extensor 3/5, L knee extensor 3-/5, R ankle DF 0/5, R ankle PF 2-/5, L ankle DF/PF 3/5   Lower Body Muscle Tone   Lower Body Muscle Tone  X   Rt Lower Extremity Muscle Tone Hypotonic  (R ankle DF)   Coordination Lower Body    Coordination Lower Body  X   Comments Patient had increased difficulty to perform heel to shin B/L, incoordination (+), dysmetria (+)   Vision   Vision Comments Patient denies any vision changes; increased difficulty with tracking; L eye worse than R eye,  finger counting accurate B/L   Other Treatments   Other Treatments Provided Patient was assisted to bathroom per patient's request, performed hand hygiene by the sink. Educated about importance of daily & frequent mobility, OOB to chair for meals, ambulate with assistance from nursing staff. Discussed about DC recommendations, patient agreeable for rehab placement at this time. Assisted with don of PRAFO on R foot.   Balance Assessment   Sitting Balance (Static) Poor +   Sitting Balance (Dynamic) Poor   Standing Balance (Static) Poor +   Standing Balance (Dynamic) Poor +   Weight Shift Sitting Fair   Weight Shift Standing Poor   Comments Seated EOB: With UE support, with increased time-posterior lean and requires Mod A to come back to sitting upright, able to tolerate sitting for about 1-2 mins; Standing with FWW   Bed Mobility    Supine to Sit Contact Guard Assist   Scooting Contact Guard Assist   Rolling Contact Guard Assist   Comments HOB raised, towards R side   Gait Analysis   Gait Level Of Assist Minimal Assist   Assistive Device Front Wheel Walker   Distance (Feet) 35   Deviation Ataxic;Decreased Base Of Support;Decreased Heel Strike;Decreased Toe Off;Other (Comment)  (Asymmetrical & reduced step & stride length)   Comments Cues for directions, controlled gait speed, appropriate R foot placement; assistance mainly for balance   Functional Mobility   Sit to Stand Minimal Assist   Bed, Chair, Wheelchair Transfer Minimal Assist   Toilet Transfers Minimal Assist   Transfer Method Stand Step   Mobility Bed-EOB-sit-stand-steps to chair-sit-stand-ambulate to bathroom-toilet transfer-hand hygiene by sink-ambulate in the room-chair-sit-stand-steps back to EOB-sit-stand-steps to recliner   Comments W FWW; Cues for hand placement, LE placement, sequencing   6 Clicks Assessment - How much HELP from from another person do you currently need... (If the patient hasn't done an activity recently, how much help from another  person do you think he/she would need if he/she tried?)   Turning from your back to your side while in a flat bed without using bedrails? 3   Moving from lying on your back to sitting on the side of a flat bed without using bedrails? 3   Moving to and from a bed to a chair (including a wheelchair)? 3   Standing up from a chair using your arms (e.g., wheelchair, or bedside chair)? 3   Walking in hospital room? 3   Climbing 3-5 steps with a railing? 2   6 clicks Mobility Score 17   Activity Tolerance   Sitting in Chair Post session   Patient / Family Goals    Patient / Family Goal #1 To improve functional mobility as able   Short Term Goals    Short Term Goal # 1 Patient will perform supine-sit, sit-supine with HOB flat without rails with supervision in 6 visits to safely get in & out of bed   Short Term Goal # 2 Patient will perform sit-stand with FWW with supervision in 6 visits to progress with functional mobility   Short Term Goal # 3 Patient will perform chair transfers with FWW with supervision in 6 visits to safely get OOB to chair   Short Term Goal # 4 Patient will ambulate 100 feet with FWW with supervision in 6 visits to safely ambulate household distance   Education Group   Education Provided Role of Physical Therapist   Role of Physical Therapist Patient Response Patient;Acceptance;Explanation;Verbal Demonstration   Physical Therapy Initial Treatment Plan    Treatment Plan  Bed Mobility;Equipment;Family / Caregiver Training;Gait Training;Neuro Re-Education / Balance;Therapeutic Activities;Therapeutic Exercise   Treatment Frequency 4 Times per Week   Duration Until Therapy Goals Met   Problem List    Problems Impaired Bed Mobility;Impaired Transfers;Impaired Ambulation;Functional Strength Deficit;Functional ROM Deficit;Impaired Balance;Impaired Coordination   Anticipated Discharge Equipment and Recommendations   DC Equipment Recommendations Unable to determine at this time   Discharge Recommendations  Recommend post-acute placement for additional physical therapy services prior to discharge home   Interdisciplinary Plan of Care Collaboration   IDT Collaboration with  Nursing   Patient Position at End of Therapy Seated;Chair Alarm On;Call Light within Reach;Other (Comments)  (RN at bedside; LE elevated)   Session Information   Date / Session Number  1/6-1(1/4, 1/12)

## 2025-01-06 NOTE — ASSESSMENT & PLAN NOTE
Etiology unclear  Right sided  No evidence of stroke  Mri right hip and ankle per neurology recommendation pending  Will need outpatient EEG    EMG as outpatient  Discussed with neuro and ortho.

## 2025-01-06 NOTE — PROGRESS NOTES
[facility] Medicine Daily Progress Note    Date of Service  1/6/2025    Chief Complaint  Cinda Perrin is a 69 y.o. female admitted 1/3/2025 with right sided weakness    Hospital Course  Cinda Perrin is a 69 y.o. female with past medical history of [place]'s disease and hypothyroidism. She presented to [facility] on 1/3/2025 with acute onset right lower extremity weakness and numbness.  Of note patient was hospitalized on 12/30 and discharged 1/2/25 after treatment for weakness and falls where she was found to have hyponatremia which had resolved at time of discharge.  Patient reported she went home and suddenly started having right lower extremity weakness with numbness. She also reported foot drop and difficulty ambulating with her walker as she felt like her foot was dragging across the floor.  She denied any right upper extremity weakness or numbness, dysarthria, or dysphagia.  Patient reported her weakness did not resolve and thus came back to the ER for evaluation.  She denied any falls post discharge, new onset back pain, fevers, chills, chest pain or shortness of breath.     The ER vital signs significant for blood pressure 174/84.  Labs significant for creatinine 1.1, GFR 54, AST 81, ALT 56, alkaline phosphatase 199.  Troponin negative.  CT head workup in ER within normal limits.  Chest x-ray reviewed showed no acute findings.   MRI: More prominent patchy focus of increased T2 signal intensity in the left anterior frontal periventricular white matter also consistent with chronic microvascular ischemic gliosis.     Interval Problem Update  Axox3, no further dizziness, she denies pain, stable R foot drop otherwise no complaints Exam and vitals stable, awaiting MRI ankle. ROS otherwise negative. Minimal drop in sodium    I have discussed this patient's plan of care and discharge plan at IDT rounds today with Case Management, Nursing, Nursing leadership, and other members of the IDT  team.    Consultants/Specialty  Neurology Brown    Code Status  DNAR/DNI    Disposition  The patient is not medically cleared for discharge to home or a post-acute facility.      I have placed the appropriate orders for post-discharge needs.    Review of Systems  Review of Systems   Constitutional: Negative.  Negative for chills, diaphoresis, fever, malaise/fatigue and weight loss.   HENT: Negative.  Negative for sore throat.    Eyes: Negative.  Negative for blurred vision.   Respiratory: Negative.  Negative for cough and shortness of breath.    Cardiovascular: Negative.  Negative for chest pain, palpitations and leg swelling.   Gastrointestinal: Negative.  Negative for abdominal pain, nausea and vomiting.   Genitourinary: Negative.  Negative for dysuria.   Musculoskeletal: Negative.  Negative for myalgias.   Skin: Negative.  Negative for itching and rash.   Neurological:  Positive for weakness. Negative for dizziness, focal weakness and headaches.   Endo/Heme/Allergies: Negative.  Does not bruise/bleed easily.   Psychiatric/Behavioral: Negative.  Negative for depression, substance abuse and suicidal ideas.    All other systems reviewed and are negative.       Physical Exam  Temp:  [36.5 °C (97.7 °F)-36.7 °C (98.1 °F)] 36.6 °C (97.8 °F)  Pulse:  [76-87] 77  Resp:  [16-18] 18  BP: (118-132)/(64-70) 126/70  SpO2:  [93 %-96 %] 93 %    Physical Exam  Vitals and nursing note reviewed. Exam conducted with a chaperone present.   Constitutional:       General: She is not in acute distress.     Appearance: Normal appearance. She is not diaphoretic.   HENT:      Head: Normocephalic.      Nose: Nose normal.      Mouth/Throat:      Mouth: Mucous membranes are moist.   Eyes:      Pupils: Pupils are equal, round, and reactive to light.   Cardiovascular:      Rate and Rhythm: Normal rate and regular rhythm.      Pulses: Normal pulses.      Heart sounds: Normal heart sounds.   Pulmonary:      Effort: Pulmonary effort is normal.       Breath sounds: Normal breath sounds.   Abdominal:      General: Abdomen is flat. Bowel sounds are normal.      Palpations: Abdomen is soft.   Musculoskeletal:         General: No swelling or deformity. Normal range of motion.   Skin:     General: Skin is warm and dry.      Capillary Refill: Capillary refill takes less than 2 seconds.   Neurological:      Mental Status: She is alert and oriented to person, place, and time.      Cranial Nerves: No cranial nerve deficit.      Comments: Bradykinesia   R foot drop   Psychiatric:         Mood and Affect: Mood normal.         Behavior: Behavior normal.         Fluids    Intake/Output Summary (Last 24 hours) at 1/6/2025 1214  Last data filed at 1/6/2025 0627  Gross per 24 hour   Intake 1200 ml   Output 2650 ml   Net -1450 ml        Laboratory  Recent Labs     01/04/25  0022 01/05/25  0613 01/06/25  0058   WBC 7.5 11.1* 7.5   RBC 4.30 4.26 4.20   HEMOGLOBIN 13.6 13.4 13.2   HEMATOCRIT 39.4 39.7 39.1   MCV 91.6 93.2 93.1   MCH 31.6 31.5 31.4   MCHC 34.5 33.8 33.8   RDW 48.1 47.6 47.2   PLATELETCT 356 327 341   MPV 9.7 9.3 9.5     Recent Labs     01/04/25  0205 01/05/25  0355 01/06/25  0058   SODIUM 133* 128* 127*   POTASSIUM 3.9 4.5 4.5   CHLORIDE 96 91* 91*   CO2 26 24 25   GLUCOSE 90 150* 92   BUN 9 14 14   CREATININE 0.99 1.16 1.03   CALCIUM 9.6 9.6 9.3               Recent Labs     01/04/25  0205   TRIGLYCERIDE 141   HDL 57   LDL 68       Imaging  MR-HIP WITH & W/O RIGHT   Final Result         1. There is no fracture, dislocation, or evidence for osteonecrosis.   2. There is mild osteoarthrosis of the hips with degenerative tearing of the right acetabular labrum.   3. Partial tearing at the right gluteus minimus insertion.   4. There is some edema in the right obturator internus muscle superiorly which could be due to a strain.      MR-LUMBAR SPINE-WITH & W/O   Final Result         1.  Mild levoscoliosis of the lumbar spine.      2.  Minimal lumbar spondylotic changes at  the L4-5 and L5-S1 levels.      3.  No evidence of disc extrusion or significant compression of the thecal sac. Further is no evidence of central canal or neural foraminal stenosis.      MR-BRAIN-W/O   Final Result         1. Age-related cerebral atrophy.      2. Mild periventricular white matter changes consistent with chronic microvascular ischemic gliosis.      3. More prominent patchy focus of increased T2 signal intensity in the left anterior frontal periventricular white matter also consistent with chronic microvascular ischemic gliosis.      DX-CHEST-PORTABLE (1 VIEW)   Final Result      No acute cardiac or pulmonary abnormalities are identified.      CT-CTA NECK WITH & W/O-POST PROCESSING   Final Result      No occlusion or hemodynamically significant stenosis of the neck arteries.      CT-CTA HEAD WITH & W/O-POST PROCESS   Final Result      No large vessel occlusion, hemodynamically significant stenosis or aneurysm.      CT-CEREBRAL PERFUSION ANALYSIS   Final Result      1. Cerebral blood flow less than 30% possibly representing completed infarct = 0 mL. Based on distribution of this finding, this is unlikely to represent artifact.      2. T Max more than 6 seconds possibly representing combination of completed infarct and ischemia = 0 mL. Based on the distribution of this finding, this is unlikely to represent artifact.      3. Mismatched volume possibly representing ischemic brain/penumbra= 0 mL      4.  Please note that this cerebral perfusion study and report is Quantitative and targets supratentorial (cerebral) perfusion for evaluation of large vessel territory acute ischemia/infarction. For example, lacunar infarcts, and brainstem/posterior fossa    ischemia/infarction are not evaluated on this study.  Data acquisition is subject to artifacts which can yield non-anatomically plausible perfusion maps which may be due to motion, bolus timing, signal to noise ratio, or other technical factors.   "  Perfusion map abnormalities which show non-anatomic distributions are likely artifact.   This study is not \"stand-alone\" and should only be utilized for diagnosis, management/treatment in correlation with CT, CTA, and/or MRI and clinical factors.         CT-HEAD W/O   Final Result      1. No acute intracranial hemorrhage.   2. Atrophy and diffuse chronic microangiopathic white matter changes versus demyelination gliosis.   3. Hypodensity in the left frontal lobe periventricular white matter measuring 11 mm in diameter. It could represent edema, demyelination or encephalomalacia.               MR-ANKLE-WITH & W/O RIGHT    (Results Pending)   MR-CERVICAL SPINE-WITH & W/O    (Results Pending)   MR-KNEE-WITH & W/O RIGHT    (Results Pending)        Assessment/Plan  * Acute weakness- (present on admission)  Assessment & Plan  Acute right lower extremity weakness with foot drop and numbness/tingling, no further numbness or tingling  No evidence of stroke on MRI  I discussed with neurology who recommends outpatient emg  CTA workup in ER negative for large vessel occlusion  Symptoms started yesterday, outside window for tPA  PT, OT  Post acute placement pending    Foot drop  Assessment & Plan  Etiology unclear  Right sided  No evidence of stroke  Mri right hip and ankle per neurology recommendation pending  Will need outpatient EEG    Hypotension  Assessment & Plan  Likely due to lisinopril and dehydration, now resolved ,holding lisinopril  following    Leukocytosis  Assessment & Plan  Mild  No clinical signs of infection currently  Cbc in am   following    Dysphagia  Assessment & Plan  Continue speech therapy  Follow up with GI as outpatient per speech recommendation    Transaminitis- (present on admission)  Assessment & Plan  Appears chronic  Following  Alk phos improving  Cmp in am     GERD without esophagitis- (present on admission)  Assessment & Plan  Continue PPI    Benign hypertension with CKD (chronic kidney " disease) stage III- (present on admission)  Assessment & Plan  Continue lisinopril  As needed antihypertensives, no need for permissive hypertension    Hyponatremia- (present on admission)  Assessment & Plan  Mild, decreasing  Following   Bmp in am     Insomnia due to medical condition- (present on admission)  Assessment & Plan  Continue Remeron    Acquired hypothyroidism- (present on admission)  Assessment & Plan  Continue home levothyroxine    Mixed dyslipidemia- (present on admission)  Assessment & Plan  Continue colestipol  Patient with mild AST/ALT elevation    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 In review of yesterday's note (1/5/2025), there are no changes except as documented above.

## 2025-01-07 LAB
ALBUMIN SERPL BCP-MCNC: 3.4 G/DL (ref 3.2–4.9)
ALBUMIN/GLOB SERPL: 1.1 G/DL
ALP SERPL-CCNC: 155 U/L (ref 30–99)
ALT SERPL-CCNC: 29 U/L (ref 2–50)
ANION GAP SERPL CALC-SCNC: 11 MMOL/L (ref 7–16)
AST SERPL-CCNC: 43 U/L (ref 12–45)
BASOPHILS # BLD AUTO: 1 % (ref 0–1.8)
BASOPHILS # BLD: 0.06 K/UL (ref 0–0.12)
BILIRUB SERPL-MCNC: 0.3 MG/DL (ref 0.1–1.5)
BUN SERPL-MCNC: 15 MG/DL (ref 8–22)
CALCIUM ALBUM COR SERPL-MCNC: 10.1 MG/DL (ref 8.5–10.5)
CALCIUM SERPL-MCNC: 9.6 MG/DL (ref 8.5–10.5)
CHLORIDE SERPL-SCNC: 93 MMOL/L (ref 96–112)
CO2 SERPL-SCNC: 23 MMOL/L (ref 20–33)
CREAT SERPL-MCNC: 1.16 MG/DL (ref 0.5–1.4)
EOSINOPHIL # BLD AUTO: 0.22 K/UL (ref 0–0.51)
EOSINOPHIL NFR BLD: 3.7 % (ref 0–6.9)
ERYTHROCYTE [DISTWIDTH] IN BLOOD BY AUTOMATED COUNT: 48.8 FL (ref 35.9–50)
GFR SERPLBLD CREATININE-BSD FMLA CKD-EPI: 51 ML/MIN/1.73 M 2
GLOBULIN SER CALC-MCNC: 3.1 G/DL (ref 1.9–3.5)
GLUCOSE SERPL-MCNC: 88 MG/DL (ref 65–99)
HCT VFR BLD AUTO: 42.3 % (ref 37–47)
HGB BLD-MCNC: 13.9 G/DL (ref 12–16)
IMM GRANULOCYTES # BLD AUTO: 0.03 K/UL (ref 0–0.11)
IMM GRANULOCYTES NFR BLD AUTO: 0.5 % (ref 0–0.9)
LYMPHOCYTES # BLD AUTO: 1.6 K/UL (ref 1–4.8)
LYMPHOCYTES NFR BLD: 26.6 % (ref 22–41)
MCH RBC QN AUTO: 31.8 PG (ref 27–33)
MCHC RBC AUTO-ENTMCNC: 32.9 G/DL (ref 32.2–35.5)
MCV RBC AUTO: 96.8 FL (ref 81.4–97.8)
MONOCYTES # BLD AUTO: 0.72 K/UL (ref 0–0.85)
MONOCYTES NFR BLD AUTO: 12 % (ref 0–13.4)
NEUTROPHILS # BLD AUTO: 3.39 K/UL (ref 1.82–7.42)
NEUTROPHILS NFR BLD: 56.2 % (ref 44–72)
NRBC # BLD AUTO: 0 K/UL
NRBC BLD-RTO: 0 /100 WBC (ref 0–0.2)
PLATELET # BLD AUTO: 336 K/UL (ref 164–446)
PMV BLD AUTO: 9.5 FL (ref 9–12.9)
POTASSIUM SERPL-SCNC: 4.6 MMOL/L (ref 3.6–5.5)
PROT SERPL-MCNC: 6.5 G/DL (ref 6–8.2)
RBC # BLD AUTO: 4.37 M/UL (ref 4.2–5.4)
SODIUM SERPL-SCNC: 127 MMOL/L (ref 135–145)
WBC # BLD AUTO: 6 K/UL (ref 4.8–10.8)

## 2025-01-07 PROCEDURE — 80053 COMPREHEN METABOLIC PANEL: CPT

## 2025-01-07 PROCEDURE — A9270 NON-COVERED ITEM OR SERVICE: HCPCS | Performed by: HOSPITALIST

## 2025-01-07 PROCEDURE — 700102 HCHG RX REV CODE 250 W/ 637 OVERRIDE(OP): Performed by: INTERNAL MEDICINE

## 2025-01-07 PROCEDURE — 85025 COMPLETE CBC W/AUTO DIFF WBC: CPT

## 2025-01-07 PROCEDURE — 99233 SBSQ HOSP IP/OBS HIGH 50: CPT | Performed by: HOSPITALIST

## 2025-01-07 PROCEDURE — 700102 HCHG RX REV CODE 250 W/ 637 OVERRIDE(OP): Performed by: HOSPITALIST

## 2025-01-07 PROCEDURE — 700111 HCHG RX REV CODE 636 W/ 250 OVERRIDE (IP): Mod: JZ | Performed by: INTERNAL MEDICINE

## 2025-01-07 PROCEDURE — 36415 COLL VENOUS BLD VENIPUNCTURE: CPT

## 2025-01-07 PROCEDURE — A9270 NON-COVERED ITEM OR SERVICE: HCPCS | Performed by: INTERNAL MEDICINE

## 2025-01-07 PROCEDURE — 770020 HCHG ROOM/CARE - TELE (206)

## 2025-01-07 RX ADMIN — OMEPRAZOLE 20 MG: 20 CAPSULE, DELAYED RELEASE ORAL at 04:49

## 2025-01-07 RX ADMIN — SENNOSIDES AND DOCUSATE SODIUM 1 TABLET: 50; 8.6 TABLET ORAL at 04:49

## 2025-01-07 RX ADMIN — POTASSIUM CHLORIDE 10 MEQ: 750 TABLET, EXTENDED RELEASE ORAL at 04:49

## 2025-01-07 RX ADMIN — COLESTIPOL HYDROCHLORIDE 1 G: 1 TABLET, FILM COATED ORAL at 20:36

## 2025-01-07 RX ADMIN — ASPIRIN 81 MG: 81 TABLET, CHEWABLE ORAL at 04:49

## 2025-01-07 RX ADMIN — MIRTAZAPINE 60 MG: 15 TABLET, FILM COATED ORAL at 17:38

## 2025-01-07 RX ADMIN — DEUTETRABENAZINE 2 TABLET: 12 TABLET, COATED ORAL at 20:36

## 2025-01-07 RX ADMIN — LEVOTHYROXINE SODIUM 50 MCG: 50 TABLET ORAL at 04:48

## 2025-01-07 RX ADMIN — POLYETHYLENE GLYCOL 3350 1 PACKET: 17 POWDER, FOR SOLUTION ORAL at 04:43

## 2025-01-07 RX ADMIN — DEUTETRABENAZINE 2 TABLET: 12 TABLET, COATED ORAL at 10:05

## 2025-01-07 RX ADMIN — ENOXAPARIN SODIUM 40 MG: 100 INJECTION SUBCUTANEOUS at 17:38

## 2025-01-07 ASSESSMENT — ENCOUNTER SYMPTOMS
EYES NEGATIVE: 1
DEPRESSION: 0
NAUSEA: 0
CONSTITUTIONAL NEGATIVE: 1
SORE THROAT: 0
ABDOMINAL PAIN: 0
SHORTNESS OF BREATH: 0
WEAKNESS: 1
COUGH: 0
FEVER: 0
PALPITATIONS: 0
CHILLS: 0
VOMITING: 0
GASTROINTESTINAL NEGATIVE: 1
DIAPHORESIS: 0
PSYCHIATRIC NEGATIVE: 1
WEIGHT LOSS: 0
HEADACHES: 0
DIZZINESS: 0
BLURRED VISION: 0
MYALGIAS: 0
MUSCULOSKELETAL NEGATIVE: 1
FOCAL WEAKNESS: 0
CARDIOVASCULAR NEGATIVE: 1
RESPIRATORY NEGATIVE: 1
BRUISES/BLEEDS EASILY: 0

## 2025-01-07 ASSESSMENT — PAIN DESCRIPTION - PAIN TYPE
TYPE: ACUTE PAIN
TYPE: ACUTE PAIN

## 2025-01-07 ASSESSMENT — LIFESTYLE VARIABLES: SUBSTANCE_ABUSE: 0

## 2025-01-07 ASSESSMENT — FIBROSIS 4 INDEX: FIB4 SCORE: 1.46

## 2025-01-07 NOTE — PROGRESS NOTES
[facility] Medicine Daily Progress Note    Date of Service  1/7/2025    Chief Complaint  Cinda Perrin is a 69 y.o. female admitted 1/3/2025 with right sided weakness    Hospital Course  Cinda Prerin is a 69 y.o. female with past medical history of [place]'s disease and hypothyroidism. She presented to [facility] on 1/3/2025 with acute onset right lower extremity weakness and numbness.  Of note patient was hospitalized on 12/30 and discharged 1/2/25 after treatment for weakness and falls where she was found to have hyponatremia which had resolved at time of discharge.  Patient reported she went home and suddenly started having right lower extremity weakness with numbness. She also reported foot drop and difficulty ambulating with her walker as she felt like her foot was dragging across the floor.  She denied any right upper extremity weakness or numbness, dysarthria, or dysphagia.  Patient reported her weakness did not resolve and thus came back to the ER for evaluation.  She denied any falls post discharge, new onset back pain, fevers, chills, chest pain or shortness of breath.     The ER vital signs significant for blood pressure 174/84.  Labs significant for creatinine 1.1, GFR 54, AST 81, ALT 56, alkaline phosphatase 199.  Troponin negative.  CT head workup in ER within normal limits.  Chest x-ray reviewed showed no acute findings.   MRI: More prominent patchy focus of increased T2 signal intensity in the left anterior frontal periventricular white matter also consistent with chronic microvascular ischemic gliosis.     Interval Problem Update  Axox3, no further dizziness, she denies pain, stable R foot drop otherwise no complaints Exam and vitals stable, awaiting MRI ankle. ROS otherwise negative. Minimal drop in sodium.      1/7 patient is new to me today, patient is in bed, she follows commands, continue having right lower extremity weakness, PT OT recommending postacute placement, have discussed with  neurology and neurology reviewed MRI of the C-spine and ankle and knee recommending outpatient follow-up with neurology, I also discussed with orthopedic surgeon who at this time also agreed with recommendation to follow-up with orthopedic surgery as outpatient no need for acute intervention at this time, patient denies any neck pain, I have discussed with bedside nurse charge nurse  pharmacist all questions been answered.    I have discussed this patient's plan of care and discharge plan at IDT rounds today with Case Management, Nursing, Nursing leadership, and other members of the IDT team.    Consultants/Specialty  Neurology Brown    Code Status  DNAR/DNI    Disposition  The patient is not medically cleared for discharge to home or a post-acute facility.      I have placed the appropriate orders for post-discharge needs.    Review of Systems  Review of Systems   Constitutional: Negative.  Negative for chills, diaphoresis, fever, malaise/fatigue and weight loss.   HENT: Negative.  Negative for sore throat.    Eyes: Negative.  Negative for blurred vision.   Respiratory: Negative.  Negative for cough and shortness of breath.    Cardiovascular: Negative.  Negative for chest pain, palpitations and leg swelling.   Gastrointestinal: Negative.  Negative for abdominal pain, nausea and vomiting.   Genitourinary: Negative.  Negative for dysuria.   Musculoskeletal: Negative.  Negative for myalgias.   Skin: Negative.  Negative for itching and rash.   Neurological:  Positive for weakness. Negative for dizziness, focal weakness and headaches.   Endo/Heme/Allergies: Negative.  Does not bruise/bleed easily.   Psychiatric/Behavioral: Negative.  Negative for depression, substance abuse and suicidal ideas.    All other systems reviewed and are negative.       Physical Exam  Temp:  [36.7 °C (98.1 °F)] 36.7 °C (98.1 °F)  Pulse:  [76-98] 98  Resp:  [18] 18  BP: (101-109)/(64-74) 108/71  SpO2:  [95 %-96 %] 95 %    Physical  Exam  Vitals and nursing note reviewed. Exam conducted with a chaperone present.   Constitutional:       General: She is not in acute distress.     Appearance: Normal appearance. She is not diaphoretic.   HENT:      Head: Normocephalic.      Nose: Nose normal.      Mouth/Throat:      Mouth: Mucous membranes are moist.   Eyes:      Pupils: Pupils are equal, round, and reactive to light.   Cardiovascular:      Rate and Rhythm: Normal rate and regular rhythm.      Pulses: Normal pulses.      Heart sounds: Normal heart sounds.   Pulmonary:      Effort: Pulmonary effort is normal.      Breath sounds: Normal breath sounds.   Abdominal:      General: Abdomen is flat. Bowel sounds are normal.      Palpations: Abdomen is soft.   Musculoskeletal:         General: No swelling or deformity. Normal range of motion.   Skin:     General: Skin is warm and dry.      Capillary Refill: Capillary refill takes less than 2 seconds.   Neurological:      Mental Status: She is alert and oriented to person, place, and time.      Cranial Nerves: No cranial nerve deficit.      Comments: Bradykinesia   R foot drop   Psychiatric:         Mood and Affect: Mood normal.         Behavior: Behavior normal.         Fluids    Intake/Output Summary (Last 24 hours) at 1/7/2025 1445  Last data filed at 1/7/2025 0453  Gross per 24 hour   Intake 900 ml   Output 1900 ml   Net -1000 ml        Laboratory  Recent Labs     01/05/25  0613 01/06/25  0058 01/07/25  0353   WBC 11.1* 7.5 6.0   RBC 4.26 4.20 4.37   HEMOGLOBIN 13.4 13.2 13.9   HEMATOCRIT 39.7 39.1 42.3   MCV 93.2 93.1 96.8   MCH 31.5 31.4 31.8   MCHC 33.8 33.8 32.9   RDW 47.6 47.2 48.8   PLATELETCT 327 341 336   MPV 9.3 9.5 9.5     Recent Labs     01/05/25  0355 01/06/25  0058 01/07/25  0353   SODIUM 128* 127* 127*   POTASSIUM 4.5 4.5 4.6   CHLORIDE 91* 91* 93*   CO2 24 25 23   GLUCOSE 150* 92 88   BUN 14 14 15   CREATININE 1.16 1.03 1.16   CALCIUM 9.6 9.3 9.6                        Imaging  MR-KNEE-WITH & W/O RIGHT   Final Result      1.  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      2.  Chronic sprain/partial thickness tear of the anterior cruciate ligament.      3.  No evidence of osteomyelitis, septic arthritis or soft tissue mass.      MR-CERVICAL SPINE-WITH & W/O   Final Result         1.  Minimal degenerative changes in the cervical spine. There is no significant canal stenosis. There is moderate left foraminal narrowing at C5-6.      2.  10-20% loss of height along the superior endplate of T2 with minimal bone marrow edema and enhancement suggests an acute-subacute compression fracture. Please correlate for the presence of focal tenderness.         MR-ANKLE-WITH & W/O RIGHT   Final Result      1.  No evidence of osteomyelitis, septic arthritis or soft tissue mass.      2.  Longitudinal splitting of the peroneus brevis tendon just below the level of the lateral malleolus.      3.  Intact ligaments.      MR-HIP WITH & W/O RIGHT   Final Result         1. There is no fracture, dislocation, or evidence for osteonecrosis.   2. There is mild osteoarthrosis of the hips with degenerative tearing of the right acetabular labrum.   3. Partial tearing at the right gluteus minimus insertion.   4. There is some edema in the right obturator internus muscle superiorly which could be due to a strain.      MR-LUMBAR SPINE-WITH & W/O   Final Result         1.  Mild levoscoliosis of the lumbar spine.      2.  Minimal lumbar spondylotic changes at the L4-5 and L5-S1 levels.      3.  No evidence of disc extrusion or significant compression of the thecal sac. Further is no evidence of central canal or neural foraminal stenosis.      MR-BRAIN-W/O   Final Result         1. Age-related cerebral atrophy.      2. Mild periventricular white matter changes consistent with chronic microvascular ischemic gliosis.      3. More prominent patchy focus  "of increased T2 signal intensity in the left anterior frontal periventricular white matter also consistent with chronic microvascular ischemic gliosis.      DX-CHEST-PORTABLE (1 VIEW)   Final Result      No acute cardiac or pulmonary abnormalities are identified.      CT-CTA NECK WITH & W/O-POST PROCESSING   Final Result      No occlusion or hemodynamically significant stenosis of the neck arteries.      CT-CTA HEAD WITH & W/O-POST PROCESS   Final Result      No large vessel occlusion, hemodynamically significant stenosis or aneurysm.      CT-CEREBRAL PERFUSION ANALYSIS   Final Result      1. Cerebral blood flow less than 30% possibly representing completed infarct = 0 mL. Based on distribution of this finding, this is unlikely to represent artifact.      2. T Max more than 6 seconds possibly representing combination of completed infarct and ischemia = 0 mL. Based on the distribution of this finding, this is unlikely to represent artifact.      3. Mismatched volume possibly representing ischemic brain/penumbra= 0 mL      4.  Please note that this cerebral perfusion study and report is Quantitative and targets supratentorial (cerebral) perfusion for evaluation of large vessel territory acute ischemia/infarction. For example, lacunar infarcts, and brainstem/posterior fossa    ischemia/infarction are not evaluated on this study.  Data acquisition is subject to artifacts which can yield non-anatomically plausible perfusion maps which may be due to motion, bolus timing, signal to noise ratio, or other technical factors.    Perfusion map abnormalities which show non-anatomic distributions are likely artifact.   This study is not \"stand-alone\" and should only be utilized for diagnosis, management/treatment in correlation with CT, CTA, and/or MRI and clinical factors.         CT-HEAD W/O   Final Result      1. No acute intracranial hemorrhage.   2. Atrophy and diffuse chronic microangiopathic white matter changes versus " demyelination gliosis.   3. Hypodensity in the left frontal lobe periventricular white matter measuring 11 mm in diameter. It could represent edema, demyelination or encephalomalacia.                    Assessment/Plan  * Acute weakness- (present on admission)  Assessment & Plan  Acute right lower extremity weakness with foot drop and numbness/tingling, no further numbness or tingling  No evidence of stroke on MRI  I discussed with neurology who recommends outpatient emg  CTA workup in ER negative for large vessel occlusion  Symptoms started yesterday, outside window for tPA  PT, OT  Post acute placement pending    Discussed with neurologist, at this time recommending to continue supportive treatment, outpatient f/u with neuro for EMG.   MRI c spine, knee and ankle reviewed, discussed with ortho Dr Chao f/u as outpatient recommended.     Foot drop  Assessment & Plan  Etiology unclear  Right sided  No evidence of stroke  Mri right hip and ankle per neurology recommendation pending  Will need outpatient EEG    EMG as outpatient  Discussed with neuro and ortho.     Hypotension  Assessment & Plan  Likely due to lisinopril and dehydration, now resolved ,holding lisinopril  following    Leukocytosis  Assessment & Plan  Mild  No clinical signs of infection currently  Cbc in am   following    Dysphagia  Assessment & Plan  Continue speech therapy  Follow up with GI as outpatient per speech recommendation  Tolerating diet.     Transaminitis- (present on admission)  Assessment & Plan  Appears chronic  Following  Alk phos improving  Cmp in am   LFT's trending down.     GERD without esophagitis- (present on admission)  Assessment & Plan  Continue PPI    Benign hypertension with CKD (chronic kidney disease) stage III- (present on admission)  Assessment & Plan  Continue lisinopril  As needed antihypertensives, no need for permissive hypertension    Hyponatremia- (present on admission)  Assessment & Plan  Mild,  decreasing  Following   Bmp in am     Insomnia due to medical condition- (present on admission)  Assessment & Plan  Continue Remeron    Acquired hypothyroidism- (present on admission)  Assessment & Plan  Continue home levothyroxine    Mixed dyslipidemia- (present on admission)  Assessment & Plan  Continue colestipol  Patient with mild AST/ALT elevation    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 In review of yesterday's note (1/6/2025), there are no changes except as documented above.      Total time of 51 minutes spent prepping to see patient (e.g. reviewing  tests/imaging results, notes from consultants, bedside nurse, night shift ) obtaining and/or reviewing separately obtained history. Performing a medically appropriate examination and evaluation.  Counseling and educating the patient.  Ordering medications, tests, or procedures.  Referring and communicating with other health care professionals.  Documenting clinical information in EPIC.  Independently interpreting results and communicating results to patient.  Care coordination.

## 2025-01-07 NOTE — CARE PLAN
The patient is Stable - Low risk of patient condition declining or worsening    Shift Goals  Clinical Goals: mobility, safety, skin integrity  Patient Goals: dinner and sleep  Family Goals: tamika    Progress made toward(s) clinical / shift goals:      Problem: Neuro Status  Goal: Neuro status will remain stable or improve  Outcome: Progressing     Problem: Hemodynamic Monitoring  Goal: Patient's hemodynamics, fluid balance and neurologic status will be stable or improve  Outcome: Progressing     Problem: Mobility - Stroke  Goal: Patient's capacity to carry out activities will improve  Outcome: Progressing     Problem: Skin Integrity  Goal: Skin integrity is maintained or improved  Outcome: Progressing     Problem: Fall Risk  Goal: Patient will remain free from falls  Outcome: Progressing       Patient is not progressing towards the following goals: N/A

## 2025-01-07 NOTE — DISCHARGE PLANNING
Cinda only has MCR-A.  Unfortunately, this precludes an admission to [facility].  TCC will no longer follow.  Please reach out to myself with any questions.

## 2025-01-07 NOTE — DISCHARGE PLANNING
Pt discussed in IDT rounds, pt declined from [facility] as pt has no rehab insurance benefits.  SNF blanket placed. PASRR initiated, pending manual review.    Pt is agreeable to SNF placement, obtained choice for 1) [facility] and 2) [facility].  Faxed to [facility] for processing.    IMM delivered and faxed to [facility] for processing.

## 2025-01-07 NOTE — PROGRESS NOTES
Monitor summary: SR/ST , CT .13, QRS .08, QT .34 with rare PACs per strip from monitor room.

## 2025-01-08 LAB
ANION GAP SERPL CALC-SCNC: 10 MMOL/L (ref 7–16)
BASOPHILS # BLD AUTO: 1.1 % (ref 0–1.8)
BASOPHILS # BLD: 0.07 K/UL (ref 0–0.12)
BUN SERPL-MCNC: 16 MG/DL (ref 8–22)
CALCIUM SERPL-MCNC: 9.3 MG/DL (ref 8.5–10.5)
CHLORIDE SERPL-SCNC: 93 MMOL/L (ref 96–112)
CO2 SERPL-SCNC: 28 MMOL/L (ref 20–33)
CREAT SERPL-MCNC: 1.01 MG/DL (ref 0.5–1.4)
EOSINOPHIL # BLD AUTO: 0.18 K/UL (ref 0–0.51)
EOSINOPHIL NFR BLD: 2.8 % (ref 0–6.9)
ERYTHROCYTE [DISTWIDTH] IN BLOOD BY AUTOMATED COUNT: 48.4 FL (ref 35.9–50)
GFR SERPLBLD CREATININE-BSD FMLA CKD-EPI: 60 ML/MIN/1.73 M 2
GLUCOSE SERPL-MCNC: 79 MG/DL (ref 65–99)
HCT VFR BLD AUTO: 43.3 % (ref 37–47)
HGB BLD-MCNC: 14.8 G/DL (ref 12–16)
IMM GRANULOCYTES # BLD AUTO: 0.02 K/UL (ref 0–0.11)
IMM GRANULOCYTES NFR BLD AUTO: 0.3 % (ref 0–0.9)
LYMPHOCYTES # BLD AUTO: 1.78 K/UL (ref 1–4.8)
LYMPHOCYTES NFR BLD: 27.4 % (ref 22–41)
MAGNESIUM SERPL-MCNC: 2.2 MG/DL (ref 1.5–2.5)
MCH RBC QN AUTO: 32.5 PG (ref 27–33)
MCHC RBC AUTO-ENTMCNC: 34.2 G/DL (ref 32.2–35.5)
MCV RBC AUTO: 95 FL (ref 81.4–97.8)
MONOCYTES # BLD AUTO: 0.98 K/UL (ref 0–0.85)
MONOCYTES NFR BLD AUTO: 15.1 % (ref 0–13.4)
NEUTROPHILS # BLD AUTO: 3.47 K/UL (ref 1.82–7.42)
NEUTROPHILS NFR BLD: 53.3 % (ref 44–72)
NRBC # BLD AUTO: 0 K/UL
NRBC BLD-RTO: 0 /100 WBC (ref 0–0.2)
PHOSPHATE SERPL-MCNC: 3.9 MG/DL (ref 2.5–4.5)
PLATELET # BLD AUTO: 366 K/UL (ref 164–446)
PMV BLD AUTO: 9.4 FL (ref 9–12.9)
POTASSIUM SERPL-SCNC: 4.7 MMOL/L (ref 3.6–5.5)
RBC # BLD AUTO: 4.56 M/UL (ref 4.2–5.4)
SODIUM SERPL-SCNC: 131 MMOL/L (ref 135–145)
WBC # BLD AUTO: 6.5 K/UL (ref 4.8–10.8)

## 2025-01-08 PROCEDURE — 83735 ASSAY OF MAGNESIUM: CPT

## 2025-01-08 PROCEDURE — A9270 NON-COVERED ITEM OR SERVICE: HCPCS | Performed by: INTERNAL MEDICINE

## 2025-01-08 PROCEDURE — 85025 COMPLETE CBC W/AUTO DIFF WBC: CPT

## 2025-01-08 PROCEDURE — 99232 SBSQ HOSP IP/OBS MODERATE 35: CPT | Performed by: HOSPITALIST

## 2025-01-08 PROCEDURE — 770006 HCHG ROOM/CARE - MED/SURG/GYN SEMI*

## 2025-01-08 PROCEDURE — 700111 HCHG RX REV CODE 636 W/ 250 OVERRIDE (IP): Mod: JZ | Performed by: INTERNAL MEDICINE

## 2025-01-08 PROCEDURE — 700102 HCHG RX REV CODE 250 W/ 637 OVERRIDE(OP): Performed by: INTERNAL MEDICINE

## 2025-01-08 PROCEDURE — 700102 HCHG RX REV CODE 250 W/ 637 OVERRIDE(OP): Performed by: HOSPITALIST

## 2025-01-08 PROCEDURE — 84100 ASSAY OF PHOSPHORUS: CPT

## 2025-01-08 PROCEDURE — 36415 COLL VENOUS BLD VENIPUNCTURE: CPT

## 2025-01-08 PROCEDURE — 97530 THERAPEUTIC ACTIVITIES: CPT | Mod: CQ

## 2025-01-08 PROCEDURE — A9270 NON-COVERED ITEM OR SERVICE: HCPCS | Performed by: HOSPITALIST

## 2025-01-08 PROCEDURE — 80048 BASIC METABOLIC PNL TOTAL CA: CPT

## 2025-01-08 RX ORDER — BISACODYL 10 MG
10 SUPPOSITORY, RECTAL RECTAL
Status: DISCONTINUED | OUTPATIENT
Start: 2025-01-08 | End: 2025-01-09 | Stop reason: HOSPADM

## 2025-01-08 RX ADMIN — ASPIRIN 81 MG: 81 TABLET, CHEWABLE ORAL at 04:07

## 2025-01-08 RX ADMIN — LEVOTHYROXINE SODIUM 50 MCG: 50 TABLET ORAL at 04:07

## 2025-01-08 RX ADMIN — SENNOSIDES AND DOCUSATE SODIUM 1 TABLET: 50; 8.6 TABLET ORAL at 04:07

## 2025-01-08 RX ADMIN — MIRTAZAPINE 60 MG: 15 TABLET, FILM COATED ORAL at 17:57

## 2025-01-08 RX ADMIN — COLESTIPOL HYDROCHLORIDE 1 G: 1 TABLET, FILM COATED ORAL at 20:20

## 2025-01-08 RX ADMIN — POTASSIUM CHLORIDE 10 MEQ: 750 TABLET, EXTENDED RELEASE ORAL at 04:57

## 2025-01-08 RX ADMIN — POLYETHYLENE GLYCOL 3350 1 PACKET: 17 POWDER, FOR SOLUTION ORAL at 17:44

## 2025-01-08 RX ADMIN — DEUTETRABENAZINE 2 TABLET: 12 TABLET, COATED ORAL at 20:20

## 2025-01-08 RX ADMIN — DEUTETRABENAZINE 2 TABLET: 12 TABLET, COATED ORAL at 10:04

## 2025-01-08 RX ADMIN — ENOXAPARIN SODIUM 40 MG: 100 INJECTION SUBCUTANEOUS at 17:57

## 2025-01-08 RX ADMIN — OMEPRAZOLE 20 MG: 20 CAPSULE, DELAYED RELEASE ORAL at 04:07

## 2025-01-08 ASSESSMENT — COGNITIVE AND FUNCTIONAL STATUS - GENERAL
SUGGESTED CMS G CODE MODIFIER MOBILITY: CK
WALKING IN HOSPITAL ROOM: A LITTLE
MOBILITY SCORE: 17
CLIMB 3 TO 5 STEPS WITH RAILING: A LOT
MOVING FROM LYING ON BACK TO SITTING ON SIDE OF FLAT BED: A LITTLE
MOVING TO AND FROM BED TO CHAIR: A LITTLE
STANDING UP FROM CHAIR USING ARMS: A LITTLE
TURNING FROM BACK TO SIDE WHILE IN FLAT BAD: A LITTLE

## 2025-01-08 ASSESSMENT — ENCOUNTER SYMPTOMS
DIZZINESS: 0
ABDOMINAL PAIN: 0
CHILLS: 0
MYALGIAS: 0
HEADACHES: 0
DEPRESSION: 0
WEAKNESS: 1
FOCAL WEAKNESS: 0
DIAPHORESIS: 0
BRUISES/BLEEDS EASILY: 0
VOMITING: 0
NAUSEA: 0
COUGH: 0
WEIGHT LOSS: 0
SHORTNESS OF BREATH: 0
PALPITATIONS: 0
FEVER: 0
BLURRED VISION: 0
SORE THROAT: 0

## 2025-01-08 ASSESSMENT — LIFESTYLE VARIABLES: SUBSTANCE_ABUSE: 0

## 2025-01-08 ASSESSMENT — GAIT ASSESSMENTS
GAIT LEVEL OF ASSIST: MINIMAL ASSIST
DISTANCE (FEET): 25
DEVIATION: ATAXIC;DECREASED BASE OF SUPPORT;DECREASED HEEL STRIKE;DECREASED TOE OFF
ASSISTIVE DEVICE: FRONT WHEEL WALKER

## 2025-01-08 ASSESSMENT — PAIN DESCRIPTION - PAIN TYPE
TYPE: ACUTE PAIN
TYPE: ACUTE PAIN

## 2025-01-08 NOTE — CARE PLAN
The patient is Stable - Low risk of patient condition declining or worsening    Shift Goals  Clinical Goals: neuro exam  Patient Goals: weakness  Family Goals: tamika    Progress made toward(s) clinical / shift goals:    Problem: Neuro Status  Goal: Neuro status will remain stable or improve  Description: Target End Date:  Prior to discharge or change in level of care  Document on Neuro assessment in the Assessment flowsheet  1.  Assess and monitor neurologic status per provider order/protocol/unit policy  2.  Assess level of consciousness and orientation  8.  Identify changes in neuro status and report to provider for testing/treatment orders  Outcome: Progressing     Problem: Fall Risk  Goal: Patient will remain free from falls  Description: Target End Date:  Prior to discharge or change in level of care  Document interventions on the Di Justin Fall Risk Assessment  1.  Assess for fall risk factors  2.  Implement fall precautions, bed alarms in place  Outcome: Progressing       Patient is not progressing towards the following goals:

## 2025-01-08 NOTE — CARE PLAN
The patient is Stable - Low risk of patient condition declining or worsening    Shift Goals  Clinical Goals: neuro status, safety  Patient Goals: sleep  Family Goals: tamika    Progress made toward(s) clinical / shift goals:      Problem: Neuro Status  Goal: Neuro status will remain stable or improve  Outcome: Progressing    Q4H neuro checks in place. Pt's neurological status (A/Ox4) remains unchanged throughout shift. Bed alarm is on, call light within reach.     Problem: Fall Risk  Goal: Patient will remain free from falls  Outcome: Progressing    Bed alarm in place. Pt calls for assistance and is provided appropriate assistive devices when needed. Treaded socks used when ambulating.      Patient is not progressing towards the following goals:  N/A

## 2025-01-08 NOTE — DISCHARGE PLANNING
Documents submitted to [facility] for PASRR in manual review.  Awaiting PASRR to be completed or request for additional information.  UPDATE:  PASRR [id number]    TC placed to pt's son Joni to notify of plan for discharge to SNF as pt reports Joni is currently in [place].  LVM with direct line for call back.

## 2025-01-08 NOTE — THERAPY
"Physical Therapy   Daily Treatment     Patient Name: Cinda Perrin  Age:  69 y.o., Sex:  female  Medical Record #: [id number]  Today's Date: 1/8/2025     Precautions  Precautions: Fall Risk;Other (See Comments)  Comments: dean's disease, R foot drop, R PRAFO(+)    Assessment    Pt greeted and seen for PT treatment. Pt reports being fearful of ambulation/falling. She did ambulate 25x2 w/ FWW and Jesus for balance. She is able to lift the R foot to compensate for R foot drop. Today she reports sensation in the R foot. Pt currently limited by impaired balance and decreased strength which negatively impacts functional mobility. Pt will continue to benefit from skilled PT to address deficits.       Plan    Treatment Plan Status: Continue Current Treatment Plan  Type of Treatment: Bed Mobility, Equipment, Family / Caregiver Training, Gait Training, Neuro Re-Education / Balance, Therapeutic Activities, Therapeutic Exercise  Treatment Frequency: 4 Times per Week  Treatment Duration: Until Therapy Goals Met    DC Equipment Recommendations: Unable to determine at this time  Discharge Recommendations: Recommend post-acute placement for additional physical therapy services prior to discharge home      Subjective  \"I can't walk, I'm afraid to fall\"       01/08/25 1319   Vitals   Vitals Comments no c/o lightheadedness   Pain 0 - 10 Group   Therapist Pain Assessment Post Activity Pain Same as Prior to Activity;Nurse Notified  (no c/o pain)   Cognition    Level of Consciousness Alert   Comments pleasant and cooperative, able to make needs known   Other Treatments   Other Treatments Provided Pt continue to be unable to move R toes or perform active movement at R ankle. However she does report sensation is equal to L on top and bottom of foot.   Balance   Sitting Balance (Static) Poor +   Sitting Balance (Dynamic) Poor +   Standing Balance (Static) Poor +   Standing Balance (Dynamic) Poor +   Weight Shift Sitting Fair   Weight " Shift Standing Fair   Skilled Intervention Verbal Cuing;Sequencing;Compensatory Strategies   Comments w/ FWW   Bed Mobility    Supine to Sit Supervised   Sit to Supine Supervised   Scooting Standby Assist   Skilled Intervention Verbal Cuing   Comments HOB slightly raised. While sitting EOB and talking her balance was variable, at times had a slow posterior lean with feet lifting from floor.   Gait Analysis   Gait Level Of Assist Minimal Assist   Assistive Device Front Wheel Walker   Distance (Feet) 25   # of Times Distance was Traveled 2   Deviation Ataxic;Decreased Base Of Support;Decreased Heel Strike;Decreased Toe Off  (Asymmetrical & reduced step & stride length. ant/post slow sway of hips/ athetosis.)   Skilled Intervention Verbal Cuing;Sequencing;Compensatory Strategies   Comments no LOB, pt compensates well for R foot drop but only agreeable to ambulating short distance due to fear of falling.   Functional Mobility   Sit to Stand Contact Guard Assist   Bed, Chair, Wheelchair Transfer Contact Guard Assist   Transfer Method Stand Step   Mobility bed<>arriaga x2   Skilled Intervention Verbal Cuing;Sequencing   Comments eager to stand, can be impulsive.   Short Term Goals    Short Term Goal # 1 Patient will perform supine-sit, sit-supine with HOB flat without rails with supervision in 6 visits to safely get in & out of bed   Goal Outcome # 1 Progressing as expected   Short Term Goal # 2 Patient will perform sit-stand with FWW with supervision in 6 visits to progress with functional mobility   Goal Outcome # 2 Progressing as expected   Short Term Goal # 3 Patient will perform chair transfers with FWW with supervision in 6 visits to safely get OOB to chair   Goal Outcome # 3 Goal not met   Short Term Goal # 4 Patient will ambulate 100 feet with FWW with supervision in 6 visits to safely ambulate household distance   Goal Outcome # 4 Goal not met   Supervising Physical Therapist (PTA Treatments Only)   Supervising  Physical Therapist Jaclyn Karimi

## 2025-01-09 VITALS
OXYGEN SATURATION: 96 % | HEIGHT: 66 IN | TEMPERATURE: 98.8 F | BODY MASS INDEX: 22.6 KG/M2 | DIASTOLIC BLOOD PRESSURE: 67 MMHG | WEIGHT: 140.65 LBS | HEART RATE: 91 BPM | SYSTOLIC BLOOD PRESSURE: 107 MMHG | RESPIRATION RATE: 18 BRPM

## 2025-01-09 PROBLEM — D72.829 LEUKOCYTOSIS: Status: RESOLVED | Noted: 2025-01-05 | Resolved: 2025-01-09

## 2025-01-09 PROBLEM — I95.9 HYPOTENSION: Status: RESOLVED | Noted: 2025-01-05 | Resolved: 2025-01-09

## 2025-01-09 PROBLEM — R53.1 ACUTE WEAKNESS: Status: RESOLVED | Noted: 2025-01-03 | Resolved: 2025-01-09

## 2025-01-09 PROBLEM — R74.01 TRANSAMINITIS: Status: RESOLVED | Noted: 2025-01-03 | Resolved: 2025-01-09

## 2025-01-09 PROBLEM — R13.10 DYSPHAGIA: Status: RESOLVED | Noted: 2025-01-04 | Resolved: 2025-01-09

## 2025-01-09 PROCEDURE — A9270 NON-COVERED ITEM OR SERVICE: HCPCS | Performed by: INTERNAL MEDICINE

## 2025-01-09 PROCEDURE — 700102 HCHG RX REV CODE 250 W/ 637 OVERRIDE(OP): Performed by: INTERNAL MEDICINE

## 2025-01-09 PROCEDURE — A9270 NON-COVERED ITEM OR SERVICE: HCPCS | Performed by: NURSE PRACTITIONER

## 2025-01-09 PROCEDURE — 92526 ORAL FUNCTION THERAPY: CPT

## 2025-01-09 PROCEDURE — 99239 HOSP IP/OBS DSCHRG MGMT >30: CPT | Performed by: HOSPITALIST

## 2025-01-09 PROCEDURE — 700102 HCHG RX REV CODE 250 W/ 637 OVERRIDE(OP): Performed by: HOSPITALIST

## 2025-01-09 PROCEDURE — 700102 HCHG RX REV CODE 250 W/ 637 OVERRIDE(OP): Performed by: NURSE PRACTITIONER

## 2025-01-09 PROCEDURE — A9270 NON-COVERED ITEM OR SERVICE: HCPCS | Performed by: HOSPITALIST

## 2025-01-09 RX ADMIN — LEVOTHYROXINE SODIUM 50 MCG: 50 TABLET ORAL at 04:09

## 2025-01-09 RX ADMIN — OMEPRAZOLE 20 MG: 20 CAPSULE, DELAYED RELEASE ORAL at 04:09

## 2025-01-09 RX ADMIN — ASPIRIN 81 MG: 81 TABLET, CHEWABLE ORAL at 04:09

## 2025-01-09 RX ADMIN — BISACODYL 10 MG: 10 SUPPOSITORY RECTAL at 01:55

## 2025-01-09 RX ADMIN — SENNOSIDES AND DOCUSATE SODIUM 1 TABLET: 50; 8.6 TABLET ORAL at 04:09

## 2025-01-09 RX ADMIN — DEUTETRABENAZINE 2 TABLET: 12 TABLET, COATED ORAL at 09:12

## 2025-01-09 RX ADMIN — POTASSIUM CHLORIDE 10 MEQ: 750 TABLET, EXTENDED RELEASE ORAL at 04:09

## 2025-01-09 ASSESSMENT — PAIN DESCRIPTION - PAIN TYPE
TYPE: ACUTE PAIN
TYPE: ACUTE PAIN

## 2025-01-09 NOTE — CARE PLAN
The patient is Stable - Low risk of patient condition declining or worsening    Shift Goals  Clinical Goals: stable neurological status, fluid restriction, bowel movement  Patient Goals: have a bowel movement  Family Goals: tamika    Progress made toward(s) clinical / shift goals:      Problem: Neuro Status  Goal: Neuro status will remain stable or improve  Outcome: Progressing     Problem: Skin Integrity  Goal: Skin integrity is maintained or improved  Outcome: Progressing     Problem: Fall Risk  Goal: Patient will remain free from falls  Outcome: Progressing

## 2025-01-09 NOTE — DISCHARGE PLANNING
DC Transport Scheduled    Transport Company Scheduled:  [employer]  Spoke with Solange at [facility] to schedule transport.    Scheduled Date: 1/9/2025  Scheduled Time: 1100    Transport Type: [place]  Destination: [facility]   Destination address: [address] [place] [address]      Notified care team of scheduled transport via Voalte.     If there are any changes needed to the DC transportation scheduled, please contact [facility] at ext. [phone] between the hours of [phone]. If outside those hours, contact the ED Case Manager at ext. [phone].

## 2025-01-09 NOTE — PROGRESS NOTES
4 Eyes Skin Assessment Completed by NEW Geller and NEW Prasad.    Head WDL  Ears WDL  Nose WDL  Mouth WDL  Neck WDL  Breast/Chest WDL  Shoulder Blades Redness and Blanching  Spine WDL  (R) Arm/Elbow/Hand Redness and Blanching and Bruising, skin tear/scab to right elbow with mepitel. Dressing CDI  (L) Arm/Elbow/Hand Redness and Blanching and Bruising  Abdomen Bruising  Groin WDL  Scrotum/Coccyx/Buttocks Redness and Blanching  (R) Leg Swelling  (L) Leg Swelling  (R) Heel/Foot/Toe Redness and Blanching  (L) Heel/Foot/Toe Redness and Blanching          Devices In Places Pulse Ox      Interventions In Place Pillows, Q2 Turns, and Heels Loaded W/Pillows    Possible Skin Injury Yes    Pictures Uploaded Into Epic Yes  Wound Consult Placed Yes  RN Wound Prevention Protocol Ordered No

## 2025-01-09 NOTE — DISCHARGE SUMMARY
Discharge Summary    CHIEF COMPLAINT ON ADMISSION  Chief Complaint   Patient presents with    Possible Stroke     Right leg weakness onset yesterday (1/2/25) in afternoon +difficulty walking       Reason for Admission  Poss stroke    Admission Date  1/3/2025     CODE STATUS  DNAR/DNI    HPI & HOSPITAL COURSE  Please see original H&P and consult for specific information  Cinda Perrin is a 69 y.o. female with past medical history of [place]'s disease and hypothyroidism. She presented to [facility] on 1/3/2025 with acute onset right lower extremity weakness and numbness.  Of note patient was hospitalized on 12/30 and discharged 1/2/25 after treatment for weakness and falls where she was found to have hyponatremia which had resolved at time of discharge.  Patient reported she went home and suddenly started having right lower extremity weakness with numbness. She also reported foot drop and difficulty ambulating with her walker as she felt like her foot was dragging across the floor.  She denied any right upper extremity weakness or numbness, dysarthria, or dysphagia.  Patient reported her weakness did not resolve and thus came back to the ER for evaluation.  She denied any falls post discharge, new onset back pain, fevers, chills, chest pain or shortness of breath.     The ER vital signs significant for blood pressure 174/84.  Labs significant for creatinine 1.1, GFR 54, AST 81, ALT 56, alkaline phosphatase 199.  Troponin negative.  CT head workup in ER within normal limits.  Chest x-ray reviewed showed no acute findings.   MRI: More prominent patchy focus of increased T2 signal intensity in the left anterior frontal periventricular white matter also consistent with chronic microvascular ischemic gliosis.       Neurology evaluated patient recommended MRI of the foot ankle and brain which came back without any acute findings also MRI of the C-spine was done that did not show any acute findings, neurology  recommending EMG/NCS but unfortunately needs to be done as outpatient, I also discussed with orthopedics ordered who reviewed MRIs and did not recommend any acute intervention at this time but recommended outpatient follow-up, patient's sodium is trending up patient will continue on free water restriction, patient is on salt tablets, continue monitoring sodium will need repeat sodium levels in 3 to 5 days at the skilled nursing, will continue holding lisinopril since blood pressure is low normal, patient is alert oriented follows commands, patient is being discharged today to skilled nursing facility, patient needs to follow-up with neurology and orthopedic surgeon as outpatient, I have discussed with nurse staff, , all questions been answered.    Therefore, she is discharged in good and stable condition to skilled nursing facility.    The patient met 2-midnight criteria for an inpatient stay at the time of discharge.      FOLLOW UP ITEMS POST DISCHARGE  Primary care physician  Orthopedic surgery  Neurology    DISCHARGE DIAGNOSES  Principal Problem (Resolved):    Acute weakness (POA: Yes)  Active Problems:    Stoddard's disease (HCC) (Chronic) (POA: Yes)      Overview: 6/11/2024: Chronic condition.  Patient is currently on Austedo and       amantadine.  Patient will be seeing [place] neurology to establish care in       [place].  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  Patient's Stoddard disease had been well controlled with       Austedo however insurance coverage lapsed for two months and she had       instead been on haldol until Austedo was reinstated two weeks prior       hospitalization on 5/13/2024 with nausea and vomiting and worsening       underlying Chorea.  Telemetry neurology was consulted and patient was       recommended adding oral amantadine and giving patient as needed  Ativan for       bharath in addition to patient's current medication.  I instructed patient       and son Devin to follow-up with neurology for ongoing monitoring and       management of patient's [place] disease.  Will follow neurologist plan       of care. Patient and Alycia KELLOGG aware that dean's disease has no       cure but medicine, physical therapy and speech therapy can be helpful       manage some symptoms.     Mixed dyslipidemia (Chronic) (POA: Yes)      Overview: Previous diagnosis.  Patient is currently on simvastatin 20 mg at night.        Patient has no recent lipid panel on file but liver enzymes elevated from       5/16/2024, alkaline phosphatase 164 and AST 34, patient's serum bilirubin       1.8, alkaline phosphatase 200, AST 58 and ALT 42 from 5/13/2024.  I       instructed patient to avoid trans fat and polysaturated fatty food.  Will       repeat lab include fasting lipid panel and consider discontinuation of       statin if patient's liver enzymes persistently elevated.    Acquired hypothyroidism (Chronic) (POA: Yes)      Overview: 6/11/2024: Patient is clinically euthyroid with levothyroxine 50 mcg p.o.       daily doses.  TSH 0.71 from 5/13/2024 at [facility].  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  [facility]  [address] [address] [phone]  Follow up in 3 week(s)  dysphagia    [facility]  [address]  [address]  [phone]          MEDICATIONS ON DISCHARGE     Medication List        CONTINUE taking these medications        Instructions   Austedo 12 MG Tabs  Generic drug: Deutetrabenazine   Take 24 mg by mouth 2 times a day. Indications: Low Moor's Chorea  Dose: 24 mg     calcium carbonate 1250 (500 Ca) MG Tabs  Commonly known as: Os-Galdino 500   Take 500 mg by mouth every day. Indications: Low Amount of Calcium in the Blood  Dose: 500 mg     cholecalciferol 5000 UNIT Caps  Commonly known as: Vitamin D3   Take 5,000 Units by mouth every day.  Dose: 5,000 Units     colestipol 1 GM Tabs  Commonly known as: Colestid   Take 1 Tablet by mouth every evening.  Dose: 1 g     levothyroxine 50 MCG Tabs  Commonly known as: Synthroid   Take 1 Tablet by mouth every morning on an empty stomach. Indications: Underactive Thyroid  Dose: 50 mcg     mirtazapine 30 MG Tabs tablet  Commonly known as: Remeron   Take 2 Tablets by mouth 1/2 hour after dinner.  Dose: 60 mg     pantoprazole 20 MG tablet  Commonly known as: Protonix   Take 1 Tablet by mouth every day. Before breakfast  Dose: 20 mg     potassium chloride ER 10 MEQ tablet  Commonly known as: Klor-Con   Take 1 Tablet by mouth every day.  Dose: 10 mEq     sodium chloride 1 GM Tabs  Commonly known as: Salt   Take 1 Tablet by mouth 2 times a day with meals for 30 days.  Dose: 1 g            STOP taking these medications      lisinopril 10 MG Tabs  Commonly known as: Prinivil     traZODone 50 MG Tabs  Commonly known as: Desyrel              Allergies  Allergies   Allergen Reactions    Codeine Unspecified     Intake    Oxycodone Rash     Rash        DIET  Orders Placed This Encounter   Procedures    Diet Order Diet: Regular; Fluid modifications: (optional): Free Water Restrictions Only; Tray Modifications (optional): SLP - 1:1 Supervision by Nursing, SLP - Deliver to Nursing Station     Standing Status:   Standing     Number of Occurrences:   1     Order Specific Question:   Diet:      Answer:   Regular [1]     Order Specific Question:   Fluid modifications: (optional)     Answer:   Free Water Restrictions Only [12]     Order Specific Question:   Tray Modifications (optional)     Answer:   SLP - 1:1 Supervision by Nursing     Order Specific Question:   Tray Modifications (optional)     Answer:   SLP - Deliver to Nursing Station       ACTIVITY  As tolerated.  Weight bearing as tolerated    LINES, DRAINS, AND WOUNDS  This is an automated list. Peripheral IVs will be removed prior to discharge.  Peripheral IV 01/06/25 20 G Anterior;Left Upper arm (Active)   Site Assessment Clean;Dry;Intact 01/09/25 0800   Dressing Type Transparent 01/09/25 0800   Line Status Scrubbed the hub prior to access;Flushed;Saline locked 01/09/25 0800   Dressing Status Clean;Dry;Intact 01/09/25 0800   Dressing Intervention N/A 01/08/25 2000   Dressing Change Due 01/11/25 01/06/25 2100   Infiltration Grading ([facility]) 0 01/09/25 0800   Phlebitis Scale ([facility] Only) 0 01/09/25 0800       Peripheral IV 01/06/25 20 G Anterior;Left Forearm (Active)   Site Assessment Clean;Dry;Intact 01/09/25 0800   Dressing Type Transparent 01/09/25 0800   Line Status Scrubbed the hub prior to access;Flushed;Saline locked 01/09/25 0800   Dressing Status Clean;Dry;Intact 01/09/25 0800   Dressing Intervention N/A 01/08/25 2000   Dressing Change Due 01/11/25 01/06/25 2100   Infiltration Grading ([facility]) 0 01/09/25 0800   Phlebitis Scale ([facility] Only) 0 01/09/25 0800     External Urinary Catheter (Female Wick) (Active)   Collection Container Suction container 01/08/25 2000   Output (mL) 600 mL 01/06/25 1200      Wound 01/08/25 Abrasion Elbow Posterior Right partially scabbed. (Active)   Wound Image   01/08/25 0015   Site Assessment Clean;Dry;Intact 01/09/25 0800   Periwound Assessment Clean;Dry;Intact 01/09/25 0800   Margins Defined edges;Attached edges 01/09/25 0800   Closure Adhesive bandage 01/09/25 0800   Drainage Amount None 01/08/25  2000   Treatments Offloading 01/08/25 2000   Offloading/DME Other (comment) 01/08/25 2000   Wound Cleansing Not Applicable 01/08/25 2000   Periwound Protectant Not Applicable 01/08/25 2000   Dressing Status Clean;Dry;Intact 01/08/25 2000   Dressing Changed New 01/08/25 2000   Dressing Cleansing/Solutions Not Applicable 01/08/25 2000   Dressing Options Mepitel One 01/08/25 2000       Peripheral IV 01/06/25 20 G Anterior;Left Upper arm (Active)   Site Assessment Clean;Dry;Intact 01/09/25 0800   Dressing Type Transparent 01/09/25 0800   Line Status Scrubbed the hub prior to access;Flushed;Saline locked 01/09/25 0800   Dressing Status Clean;Dry;Intact 01/09/25 0800   Dressing Intervention N/A 01/08/25 2000   Dressing Change Due 01/11/25 01/06/25 2100   Infiltration Grading ([facility]) 0 01/09/25 0800   Phlebitis Scale ([facility] Only) 0 01/09/25 0800       Peripheral IV 01/06/25 20 G Anterior;Left Forearm (Active)   Site Assessment Clean;Dry;Intact 01/09/25 0800   Dressing Type Transparent 01/09/25 0800   Line Status Scrubbed the hub prior to access;Flushed;Saline locked 01/09/25 0800   Dressing Status Clean;Dry;Intact 01/09/25 0800   Dressing Intervention N/A 01/08/25 2000   Dressing Change Due 01/11/25 01/06/25 2100   Infiltration Grading ([facility]) 0 01/09/25 0800   Phlebitis Scale ([facility] Only) 0 01/09/25 0800               MENTAL STATUS ON TRANSFER             CONSULTATIONS  Neurology  Orthopedic surgery ([facility])    PROCEDURES  None    LABORATORY  Lab Results   Component Value Date    SODIUM 131 (L) 01/08/2025    POTASSIUM 4.7 01/08/2025    CHLORIDE 93 (L) 01/08/2025    CO2 28 01/08/2025    GLUCOSE 79 01/08/2025    BUN 16 01/08/2025    CREATININE 1.01 01/08/2025        Lab Results   Component Value Date    WBC 6.5 01/08/2025    HEMOGLOBIN 14.8 01/08/2025    HEMATOCRIT 43.3 01/08/2025    PLATELETCT 366 01/08/2025        Total time of the discharge process exceeds 32 minutes.      1/10/25 I was called by social  worker telling me that skilled nursing cannot provide patient with Austedo medication and they are asking to [facility] a [facility] prescription to [facility] so they can come to hospital and  this medication, since this is a medication for [place] and patient needs to continue with it I have sent a new prescription to [facility] pharmacy as per skilled nursing request, after discharge from skilled nursing facility patient will need to follow-up with her primary care doctor/neurology for new medications refill.

## 2025-01-09 NOTE — PROGRESS NOTES
Received report from previous shift RN. Patient denies any new onset numbness/tingling, chest pain, or shortness of breath. Assessment complete. Patient is A/Ox4, SpO2 > 90% on room air. Patient reporting a pain level of 0/10 at this time, patient declines any interventions for pain. Call light and personal belongings within reach. Bed alarm on. Q4 neuro checks in place. Hourly rounding in progress.

## 2025-01-09 NOTE — THERAPY
Speech Language Pathology   Daily Treatment     Patient Name: Cinda Perrin  AGE:  69 y.o., SEX:  female  Medical Record #: [id number]  Date of Service: 1/9/2025      Precautions:  Precautions: Fall Risk, Swallow Precautions         Subjective  Rn cleared patient for dysphagia tx and patient is alert and agreeable.        Assessment  Patient positioned upright in bed.  She was still a little too low when she initiated her breakfast meal, but improved with pillows behind her back to sit more upright.  Patient with initial cough with first bite 2/2 not being fully at 90 degrees she reported.  She had no further overt s/s of aspiration with full breakfast meal.  She did require assistance to cut up her Australian toast and open containers.  She tolerated thin liquids by straw without overt s/s of aspiration including consecutive swallows by straw.  Recommend continuing with dysphagia tx at SNF when discharged as she is still at risk 2/2 positioning and c/o of difficulty with meat in the past.        Clinical Impressions  Patient is progressing well, but would benefit from at least a short term follow up for dysphagia on discharge to next facility to ensure safety with diet provided.        Recommendations  Treatment Completed: Dysphagia Treatment       Dysphagia Treatment  Diet Consistency: Regular/Thin-Needs Set up assistance for meals.  Instrumentation: None indicated at this time  Medication: As tolerated  Supervision: Assist with meal tray set up, Close supervision - patient may be left alone for less than 5 minutes at a time  Positioning: Fully upright and midline during oral intake, Meals sitting upright in a chair, as tolerated  Risk Management : Small bites/sips, Alternate bites and sips, Slow rate of intake, Physical mobility, as tolerated  Oral Care: BID         Cognitive Treatment  Supervision Needs Upon Discharge: Intermittent assistance with IADLs (see below)  IADLs: Medication management, Financial  "management           SLP Treatment Plan  Treatment Plan: Dysphagia Treatment, Patient/Family/Caregiver Training  SLP Frequency: 2x Per Week  Estimated Duration: Until Therapy Goals Met      Anticipated Discharge Needs  Discharge Recommendations: Recommend post-acute placement for additional speech therapy services prior to discharge home  Therapy Recommendations Upon DC: Dysphagia Training, Patient / Family / Caregiver Education      Patient / Family Goals  Patient / Family Goal #1: \"I want pizza  Goal #1 Outcome: Progressing as expected  Short Term Goals  Short Term Goal # 1: Patient will consume a Regular diet with thin liquids without overt s/s of aspiration.  Goal Outcome # 1: Progressing as expected      Melida Mohamud, SLP  "

## 2025-01-09 NOTE — PROGRESS NOTES
Received report, assumed pt care. Pt a&o 4, VSS, Assessment completed. Resting comfortably in bed with call light, bedside table in reach. No c/o at this time.  Instructed to use call light when needing to get OOB verbalized understanding. Bed alarm on, bed in low position.     Patient has some weakness to RLE, no pain reported.     Hospitalist rounded and stated she is set up for transfer to SNF today around 11. Patient aware of transfer.

## 2025-01-09 NOTE — PROGRESS NOTES
[facility] Medicine Daily Progress Note    Date of Service  1/8/2025    Chief Complaint  Cinda Perrin is a 69 y.o. female admitted 1/3/2025 with right sided weakness    Hospital Course  Cinda Perrin is a 69 y.o. female with past medical history of [place]'s disease and hypothyroidism. She presented to [facility] on 1/3/2025 with acute onset right lower extremity weakness and numbness.  Of note patient was hospitalized on 12/30 and discharged 1/2/25 after treatment for weakness and falls where she was found to have hyponatremia which had resolved at time of discharge.  Patient reported she went home and suddenly started having right lower extremity weakness with numbness. She also reported foot drop and difficulty ambulating with her walker as she felt like her foot was dragging across the floor.  She denied any right upper extremity weakness or numbness, dysarthria, or dysphagia.  Patient reported her weakness did not resolve and thus came back to the ER for evaluation.  She denied any falls post discharge, new onset back pain, fevers, chills, chest pain or shortness of breath.     The ER vital signs significant for blood pressure 174/84.  Labs significant for creatinine 1.1, GFR 54, AST 81, ALT 56, alkaline phosphatase 199.  Troponin negative.  CT head workup in ER within normal limits.  Chest x-ray reviewed showed no acute findings.   MRI: More prominent patchy focus of increased T2 signal intensity in the left anterior frontal periventricular white matter also consistent with chronic microvascular ischemic gliosis.     Interval Problem Update  Axox3, no further dizziness, she denies pain, stable R foot drop otherwise no complaints Exam and vitals stable, awaiting MRI ankle. ROS otherwise negative. Minimal drop in sodium.      1/7 patient is new to me today, patient is in bed, she follows commands, continue having right lower extremity weakness, PT OT recommending postacute placement, have discussed with  neurology and neurology reviewed MRI of the C-spine and ankle and knee recommending outpatient follow-up with neurology, I also discussed with orthopedic surgeon who at this time also agreed with recommendation to follow-up with orthopedic surgery as outpatient no need for acute intervention at this time, patient denies any neck pain, I have discussed with bedside nurse charge nurse  pharmacist all questions been answered.  1/8 patient in bed, no fever, no chills, not in distress, Na is improved, not in acute distress, continue free water restriction. I have discussed with bedside nurse charge nurse  pharmacist, continue close monitoring, all questions been answered    I have discussed this patient's plan of care and discharge plan at IDT rounds today with Case Management, Nursing, Nursing leadership, and other members of the IDT team.    Consultants/Specialty  Neurology [facility]    Code Status  DNAR/DNI    Disposition  The patient is medically cleared for discharge to home or a post-acute facility.  Anticipate discharge to: skilled nursing facility    I have placed the appropriate orders for post-discharge needs.    Review of Systems  Review of Systems   Constitutional:  Negative for chills, diaphoresis, fever, malaise/fatigue and weight loss.   HENT:  Negative for sore throat.    Eyes:  Negative for blurred vision.   Respiratory:  Negative for cough and shortness of breath.    Cardiovascular:  Negative for chest pain, palpitations and leg swelling.   Gastrointestinal:  Negative for abdominal pain, nausea and vomiting.   Genitourinary:  Negative for dysuria.   Musculoskeletal:  Negative for myalgias.   Skin:  Negative for itching and rash.   Neurological:  Positive for weakness. Negative for dizziness, focal weakness and headaches.   Endo/Heme/Allergies:  Does not bruise/bleed easily.   Psychiatric/Behavioral:  Negative for depression, substance abuse and suicidal ideas.    All other systems  reviewed and are negative.       Physical Exam  Temp:  [36.5 °C (97.7 °F)-36.7 °C (98.1 °F)] 36.5 °C (97.7 °F)  Pulse:  [81-99] 89  Resp:  [17-20] 20  BP: (108-129)/(59-73) 122/72  SpO2:  [92 %-100 %] 100 %    Physical Exam  Vitals and nursing note reviewed. Exam conducted with a chaperone present.   Constitutional:       General: She is not in acute distress.     Appearance: Normal appearance. She is not diaphoretic.   HENT:      Head: Normocephalic.      Nose: Nose normal.      Mouth/Throat:      Mouth: Mucous membranes are moist.   Eyes:      General: No scleral icterus.  Cardiovascular:      Rate and Rhythm: Normal rate and regular rhythm.      Pulses: Normal pulses.      Heart sounds: Normal heart sounds.   Pulmonary:      Effort: Pulmonary effort is normal.      Breath sounds: Normal breath sounds.   Abdominal:      General: Abdomen is flat. There is no distension.      Palpations: Abdomen is soft.      Tenderness: There is no abdominal tenderness.   Musculoskeletal:         General: No swelling or deformity. Normal range of motion.   Skin:     General: Skin is warm and dry.      Capillary Refill: Capillary refill takes less than 2 seconds.   Neurological:      Mental Status: She is alert and oriented to person, place, and time.      Cranial Nerves: No cranial nerve deficit.      Comments: Bradykinesia   R foot drop   Psychiatric:         Mood and Affect: Mood normal.         Behavior: Behavior normal.         Fluids    Intake/Output Summary (Last 24 hours) at 1/8/2025 1602  Last data filed at 1/8/2025 1521  Gross per 24 hour   Intake 1140 ml   Output 1700 ml   Net -560 ml        Laboratory  Recent Labs     01/06/25  0058 01/07/25  0353 01/08/25  0146   WBC 7.5 6.0 6.5   RBC 4.20 4.37 4.56   HEMOGLOBIN 13.2 13.9 14.8   HEMATOCRIT 39.1 42.3 43.3   MCV 93.1 96.8 95.0   MCH 31.4 31.8 32.5   MCHC 33.8 32.9 34.2   RDW 47.2 48.8 48.4   PLATELETCT 341 336 366   MPV 9.5 9.5 9.4     Recent Labs     01/06/25  0058  01/07/25  0353 01/08/25  0146   SODIUM 127* 127* 131*   POTASSIUM 4.5 4.6 4.7   CHLORIDE 91* 93* 93*   CO2 25 23 28   GLUCOSE 92 88 79   BUN 14 15 16   CREATININE 1.03 1.16 1.01   CALCIUM 9.3 9.6 9.3                       Imaging  MR-KNEE-WITH & W/O RIGHT   Final Result      1.  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      2.  Chronic sprain/partial thickness tear of the anterior cruciate ligament.      3.  No evidence of osteomyelitis, septic arthritis or soft tissue mass.      MR-CERVICAL SPINE-WITH & W/O   Final Result         1.  Minimal degenerative changes in the cervical spine. There is no significant canal stenosis. There is moderate left foraminal narrowing at C5-6.      2.  10-20% loss of height along the superior endplate of T2 with minimal bone marrow edema and enhancement suggests an acute-subacute compression fracture. Please correlate for the presence of focal tenderness.         MR-ANKLE-WITH & W/O RIGHT   Final Result      1.  No evidence of osteomyelitis, septic arthritis or soft tissue mass.      2.  Longitudinal splitting of the peroneus brevis tendon just below the level of the lateral malleolus.      3.  Intact ligaments.      MR-HIP WITH & W/O RIGHT   Final Result         1. There is no fracture, dislocation, or evidence for osteonecrosis.   2. There is mild osteoarthrosis of the hips with degenerative tearing of the right acetabular labrum.   3. Partial tearing at the right gluteus minimus insertion.   4. There is some edema in the right obturator internus muscle superiorly which could be due to a strain.      MR-LUMBAR SPINE-WITH & W/O   Final Result         1.  Mild levoscoliosis of the lumbar spine.      2.  Minimal lumbar spondylotic changes at the L4-5 and L5-S1 levels.      3.  No evidence of disc extrusion or significant compression of the thecal sac. Further is no evidence of central canal or neural foraminal stenosis.  "     MR-BRAIN-W/O   Final Result         1. Age-related cerebral atrophy.      2. Mild periventricular white matter changes consistent with chronic microvascular ischemic gliosis.      3. More prominent patchy focus of increased T2 signal intensity in the left anterior frontal periventricular white matter also consistent with chronic microvascular ischemic gliosis.      DX-CHEST-PORTABLE (1 VIEW)   Final Result      No acute cardiac or pulmonary abnormalities are identified.      CT-CTA NECK WITH & W/O-POST PROCESSING   Final Result      No occlusion or hemodynamically significant stenosis of the neck arteries.      CT-CTA HEAD WITH & W/O-POST PROCESS   Final Result      No large vessel occlusion, hemodynamically significant stenosis or aneurysm.      CT-CEREBRAL PERFUSION ANALYSIS   Final Result      1. Cerebral blood flow less than 30% possibly representing completed infarct = 0 mL. Based on distribution of this finding, this is unlikely to represent artifact.      2. T Max more than 6 seconds possibly representing combination of completed infarct and ischemia = 0 mL. Based on the distribution of this finding, this is unlikely to represent artifact.      3. Mismatched volume possibly representing ischemic brain/penumbra= 0 mL      4.  Please note that this cerebral perfusion study and report is Quantitative and targets supratentorial (cerebral) perfusion for evaluation of large vessel territory acute ischemia/infarction. For example, lacunar infarcts, and brainstem/posterior fossa    ischemia/infarction are not evaluated on this study.  Data acquisition is subject to artifacts which can yield non-anatomically plausible perfusion maps which may be due to motion, bolus timing, signal to noise ratio, or other technical factors.    Perfusion map abnormalities which show non-anatomic distributions are likely artifact.   This study is not \"stand-alone\" and should only be utilized for diagnosis, management/treatment in " correlation with CT, CTA, and/or MRI and clinical factors.         CT-HEAD W/O   Final Result      1. No acute intracranial hemorrhage.   2. Atrophy and diffuse chronic microangiopathic white matter changes versus demyelination gliosis.   3. Hypodensity in the left frontal lobe periventricular white matter measuring 11 mm in diameter. It could represent edema, demyelination or encephalomalacia.                    Assessment/Plan  * Acute weakness- (present on admission)  Assessment & Plan  Acute right lower extremity weakness with foot drop and numbness/tingling, no further numbness or tingling  No evidence of stroke on MRI  I discussed with neurology who recommends outpatient emg  CTA workup in ER negative for large vessel occlusion  Symptoms started yesterday, outside window for tPA  PT, OT  Post acute placement pending    Discussed with neurologist, at this time recommending to continue supportive treatment, outpatient f/u with neuro for EMG.   MRI c spine, knee and ankle reviewed, discussed with ortho Dr Chao f/u as outpatient recommended.     Foot drop  Assessment & Plan  Etiology unclear  Right sided  No evidence of stroke  Mri right hip and ankle per neurology recommendation pending  Will need outpatient EEG    EMG as outpatient  Discussed with neuro and ortho.     Hypotension  Assessment & Plan  Likely due to lisinopril and dehydration, now resolved ,holding lisinopril  following    Leukocytosis  Assessment & Plan  Mild  No clinical signs of infection currently  Cbc in am   following    Dysphagia  Assessment & Plan  Continue speech therapy  Follow up with GI as outpatient per speech recommendation  Tolerating diet.     Transaminitis- (present on admission)  Assessment & Plan  Appears chronic  Following  Alk phos improving  Cmp in am   LFT's trending down.     GERD without esophagitis- (present on admission)  Assessment & Plan  Continue PPI    Benign hypertension with CKD (chronic kidney disease) stage III-  (present on admission)  Assessment & Plan  Continue lisinopril  As needed antihypertensives, no need for permissive hypertension    Hyponatremia- (present on admission)  Assessment & Plan  Mild, decreasing  Following   Bmp in am     Insomnia due to medical condition- (present on admission)  Assessment & Plan  Continue Remeron    Acquired hypothyroidism- (present on admission)  Assessment & Plan  Continue home levothyroxine    Mixed dyslipidemia- (present on admission)  Assessment & Plan  Continue colestipol  Patient with mild AST/ALT elevation    [place]'s disease (HCC)- (present on admission)  Assessment & Plan  Continue home medications deutetrabenazine         VTE prophylaxis:   Lovenox    I have performed a physical exam and reviewed and updated ROS and Plan today (1/8/2025). In review of yesterday's note (1/7/2025), there are no changes except as documented above.    I have reviewed sodium levels which is improved  Discussed with clinical pharmacist      My total time spent caring for the patient on the day of the encounter was 36 minutes.   This does not include time spent on separately billable procedures/tests.

## 2025-01-09 NOTE — PROGRESS NOTES
Patient Discharged and transferred to [facility]. Report called at 1030 AM to nurse Carola.     CINTHYASA arrived at 1130, all patient belongings accounted for and transferred with patient.     Medication from patient bin sent with patient belongings.

## 2025-01-10 RX ORDER — DEUTETRABENAZINE 12 MG/1
24 TABLET, COATED ORAL 2 TIMES DAILY
Qty: 180 TABLET | Refills: 0 | Status: SHIPPED | OUTPATIENT
Start: 2025-01-10

## 2025-01-13 NOTE — DISCHARGE PLANNING
TC received from pt's son Joni with concerns of care provided by [facility] where pt was transferred to.  Deferred Joni back to speak to [facility] to inquire on process for pt transfer to another SNF.  Provided Joni with list of other accepting SNFs prior to transfer.

## 2025-01-16 ENCOUNTER — TELEPHONE (OUTPATIENT)
Dept: HEALTH INFORMATION MANAGEMENT | Facility: OTHER | Age: 70
End: 2025-01-16

## 2025-02-11 PROBLEM — F10.90 ALCOHOL DRINKING PROBLEM: Status: ACTIVE | Noted: 2025-02-11

## 2025-02-11 PROBLEM — F10.90 ALCOHOL DRINKING PROBLEM: Chronic | Status: ACTIVE | Noted: 2025-02-11

## 2025-03-01 PROBLEM — R79.89 ABNORMAL LIVER FUNCTION TEST: Status: ACTIVE | Noted: 2025-02-25

## 2025-03-01 PROBLEM — Z91.89 AT RISK FOR POLYPHARMACY: Chronic | Status: ACTIVE | Noted: 2025-02-25

## 2025-03-01 PROBLEM — Z91.89 AT RISK FOR POLYPHARMACY: Status: ACTIVE | Noted: 2025-02-25

## 2025-05-27 PROBLEM — Z79.899 MEDICATION MANAGEMENT: Status: ACTIVE | Noted: 2024-07-02

## 2025-07-29 PROBLEM — R13.12 OROPHARYNGEAL DYSPHAGIA: Chronic | Status: ACTIVE | Noted: 2025-01-04

## 2025-07-29 PROBLEM — R13.12 OROPHARYNGEAL DYSPHAGIA: Status: ACTIVE | Noted: 2025-01-04

## 2025-08-20 ENCOUNTER — TELEPHONE (OUTPATIENT)
Dept: NEUROLOGY | Facility: MEDICAL CENTER | Age: 70
End: 2025-08-20
Payer: MEDICARE

## 2025-08-22 ENCOUNTER — OFFICE VISIT (OUTPATIENT)
Dept: NEUROLOGY | Facility: MEDICAL CENTER | Age: 70
End: 2025-08-22
Attending: STUDENT IN AN ORGANIZED HEALTH CARE EDUCATION/TRAINING PROGRAM
Payer: MEDICARE

## 2025-08-22 VITALS
RESPIRATION RATE: 17 BRPM | BODY MASS INDEX: 21.61 KG/M2 | DIASTOLIC BLOOD PRESSURE: 76 MMHG | WEIGHT: 134.48 LBS | OXYGEN SATURATION: 96 % | HEART RATE: 80 BPM | SYSTOLIC BLOOD PRESSURE: 118 MMHG | TEMPERATURE: 98.3 F | HEIGHT: 66 IN

## 2025-08-22 DIAGNOSIS — G10 HUNTINGTON'S DISEASE (HCC): Primary | ICD-10-CM

## 2025-08-22 PROCEDURE — 3078F DIAST BP <80 MM HG: CPT | Performed by: STUDENT IN AN ORGANIZED HEALTH CARE EDUCATION/TRAINING PROGRAM

## 2025-08-22 PROCEDURE — 99204 OFFICE O/P NEW MOD 45 MIN: CPT | Performed by: STUDENT IN AN ORGANIZED HEALTH CARE EDUCATION/TRAINING PROGRAM

## 2025-08-22 PROCEDURE — 3074F SYST BP LT 130 MM HG: CPT | Performed by: STUDENT IN AN ORGANIZED HEALTH CARE EDUCATION/TRAINING PROGRAM

## 2025-08-22 PROCEDURE — 99214 OFFICE O/P EST MOD 30 MIN: CPT

## 2025-08-22 ASSESSMENT — LIFESTYLE VARIABLES
HOW OFTEN DO YOU HAVE SIX OR MORE DRINKS ON ONE OCCASION: NEVER
HOW OFTEN DO YOU HAVE A DRINK CONTAINING ALCOHOL: NEVER
SKIP TO QUESTIONS 9-10: 1
AUDIT-C TOTAL SCORE: 0
HOW MANY STANDARD DRINKS CONTAINING ALCOHOL DO YOU HAVE ON A TYPICAL DAY: PATIENT DOES NOT DRINK

## 2025-08-22 ASSESSMENT — FIBROSIS 4 INDEX: FIB4 SCORE: 1.51
